# Patient Record
Sex: MALE | Race: ASIAN | NOT HISPANIC OR LATINO | Employment: OTHER | URBAN - METROPOLITAN AREA
[De-identification: names, ages, dates, MRNs, and addresses within clinical notes are randomized per-mention and may not be internally consistent; named-entity substitution may affect disease eponyms.]

---

## 2018-10-26 ENCOUNTER — TRANSFERRED RECORDS (OUTPATIENT)
Dept: HEALTH INFORMATION MANAGEMENT | Facility: CLINIC | Age: 68
End: 2018-10-26

## 2018-11-09 ENCOUNTER — PRE VISIT (OUTPATIENT)
Dept: UROLOGY | Facility: CLINIC | Age: 68
End: 2018-11-09

## 2018-11-09 NOTE — TELEPHONE ENCOUNTER
MEDICAL RECORDS REQUEST   [facility]  Urology Clinic  [address] [place] [address]  PHONE: [phone]  Fax: [phone]        FUTURE VISIT INFORMATION                                                   Sweetie Carrero, : 1950 scheduled for future visit at [facility]    APPOINTMENT INFORMATION:    Date: 2018    Provider:  Fracisco Gtz    Reason for Visit/Diagnosis: Disease of the [facility]    REFERRAL INFORMATION:    Referring provider:  Adolfo Harrington    Specialty: MD    Referring providers clinic:  [facility]    [facility] contact number:  [phone]    RECORDS REQUESTED FOR VISIT                                                     NOTES  STATUS/DETAILS   OFFICE NOTE from referring provider  yes   OFFICE NOTE from other specialist  no   DISCHARGE SUMMARY from hospital  no   DISCHARGE REPORT from the ER  no   OPERATIVE REPORT  no   MEDICATION LIST  yes       PRE-VISIT CHECKLIST      Record collection complete Yes  In care everywhere   Appointment appropriately scheduled           (right time/right provider) Yes   MyChart activation If no, please explain in process   Questionnaire complete If no, please explain in process     Completed by: Violeta Peterson

## 2018-11-19 ENCOUNTER — PRE VISIT (OUTPATIENT)
Dept: UROLOGY | Facility: CLINIC | Age: 68
End: 2018-11-19

## 2018-11-19 NOTE — TELEPHONE ENCOUNTER
Reason for visit: Paget's disease of the scrotum    Relevant information: Referred by Dr. Harrington    Records/imaging/labs: records available in Care Everywhere    Pt called: no need for a call    Rooming: regular

## 2018-11-20 ENCOUNTER — PATIENT OUTREACH (OUTPATIENT)
Dept: CARE COORDINATION | Facility: CLINIC | Age: 68
End: 2018-11-20

## 2018-11-26 ENCOUNTER — OFFICE VISIT (OUTPATIENT)
Dept: UROLOGY | Facility: CLINIC | Age: 68
End: 2018-11-26
Payer: MEDICARE

## 2018-11-26 ENCOUNTER — ALLIED HEALTH/NURSE VISIT (OUTPATIENT)
Dept: UROLOGY | Facility: CLINIC | Age: 68
End: 2018-11-26
Payer: MEDICARE

## 2018-11-26 ENCOUNTER — TELEPHONE (OUTPATIENT)
Dept: UROLOGY | Facility: CLINIC | Age: 68
End: 2018-11-26

## 2018-11-26 VITALS
HEART RATE: 83 BPM | DIASTOLIC BLOOD PRESSURE: 73 MMHG | WEIGHT: 155.2 LBS | HEIGHT: 66 IN | SYSTOLIC BLOOD PRESSURE: 141 MMHG | BODY MASS INDEX: 24.94 KG/M2

## 2018-11-26 DIAGNOSIS — C44.99: Primary | ICD-10-CM

## 2018-11-26 PROBLEM — S01.00XA OPEN WOUND OF SCALP: Status: ACTIVE | Noted: 2018-11-26

## 2018-11-26 PROBLEM — C63.2: Status: ACTIVE | Noted: 2018-11-26

## 2018-11-26 PROBLEM — C63.2: Status: ACTIVE | Noted: 2018-10-29

## 2018-11-26 PROBLEM — B35.6 TINEA CRURIS: Status: ACTIVE | Noted: 2018-11-26

## 2018-11-26 RX ORDER — CEFAZOLIN SODIUM 1 G/50ML
1 INJECTION, SOLUTION INTRAVENOUS SEE ADMIN INSTRUCTIONS
Status: CANCELLED | OUTPATIENT
Start: 2018-11-26

## 2018-11-26 RX ORDER — MUPIROCIN 20 MG/G
OINTMENT TOPICAL 3 TIMES DAILY
COMMUNITY

## 2018-11-26 RX ORDER — PRAVASTATIN SODIUM 20 MG
TABLET ORAL
COMMUNITY
Start: 2018-09-11

## 2018-11-26 RX ORDER — CEFAZOLIN SODIUM 2 G/50ML
2 SOLUTION INTRAVENOUS
Status: CANCELLED | OUTPATIENT
Start: 2018-11-26

## 2018-11-26 RX ORDER — LISINOPRIL AND HYDROCHLOROTHIAZIDE 12.5; 2 MG/1; MG/1
TABLET ORAL
COMMUNITY
Start: 2018-09-18 | End: 2019-02-19

## 2018-11-26 ASSESSMENT — ENCOUNTER SYMPTOMS
NAIL CHANGES: 0
SKIN CHANGES: 0
POOR WOUND HEALING: 0

## 2018-11-26 NOTE — LETTER
11/26/2018       RE: Sweetie Carrero  [address] [facility] [address]     Dear Colleague,    Thank you for referring your patient, Sweetie Carrero, to the [facility] UROLOGY AND INST FOR PROSTATE AND UROLOGIC CANCERS at [facility]. Please see a copy of my visit note below.      Name: Sweetie Carrero    MRN: [id number]   YOB: 1950                 Chief Complaint:   Extramammary Paget's Disease          History of Present Illness:   Mr. Sweetie Carrero is a 68 year old male seen in consultation from Dr. Harrington for EMPD of the scrotum. Pt has small area of EMPD of the scrotum that he presents for evaluation. This is biopsy-proven per patient and phone conversation with referring doctor but I do not have the path report.          Past Medical History:   Diabetes, HLD, HTN, kidney stones         Past Surgical History:   ESWL         Social History:     Social History   Substance Use Topics     Smoking status: Former Smoker     Smokeless tobacco: Never Used     Alcohol use Not on file            Family History:   No family history of urologic problems similar to those being experienced by the patient.          Allergies:     Allergies   Allergen Reactions     Atorvastatin Muscle Pain (Myalgia)     PN: weakness     Simvastatin Muscle Pain (Myalgia)     PN: weakness            Medications:     Current Outpatient Prescriptions   Medication Sig     aspirin (ASA) 81 MG EC tablet Take 81 mg by mouth     DIPHENHYDRAMINE HCL PO Take 25 mg by mouth     mupirocin (BACTROBAN) 2 % ointment Apply topically 3 times daily     pravastatin (PRAVACHOL) 20 MG tablet TAKE ONE TABLET BY MOUTH ONCE DAILY     lisinopril-hydrochlorothiazide (PRINZIDE/ZESTORETIC) 20-12.5 MG per tablet      No current facility-administered medications for this visit.              Review of Systems:    ROS: 14 point ROS neg other than the symptoms noted above in the HPI and PMH.          Physical Exam:   B/P: 141/73, T:  "Data Unavailable, P: 83, R: Data Unavailable  Estimated body mass index is 24.97 kg/(m^2) as calculated from the following:    Height as of this encounter: 1.679 m (5' 6.1\").    Weight as of this encounter: 70.4 kg (155 lb 3.2 oz).  General: age-appropriate appearing male in NAD. normal body habitus.  HEENT: Head AT/NC, EOMI, CN Grossly intact  Resp: no respiratory distress, lung sounds clear.  CV: heart rate regular, S1, S2.  Lymph: No cervical, supraclavicular or axillary lymphadenopathy  Back: bony spine is non-tender, flanks are nontender  Abdomen: no obesity , soft, non-distended, non-tender. No organomegaly. : testicles normal without atrophy or masses; small area of irregularity on inferior scrotum 1cm x 1cm. Two smaller patches on anterior scrotum -- left and right.   Rectal exam: deferred  LE: no edema.   Neuro: grossly intact  Motor: excellent strength throughout  Skin: clear of rashes or ecchymoses.        Labs:    All laboratory data reviewed with patient  Significant for Hgb A1C 6.5      Imaging:    N/A      Outside records:    I spent 10 minutes reviewing outside records.    Procedure Note:  Because the patient would like excision of this lesion ASAP and because I noted two other abnormal areas in clinic today we expedited a scrotum biopsy in clinic today at the same time as the consultation.    Pre-Procedure Diagnosis: scrotal lesion x 2  Post-Procedure Diagnosis: same  Procedure: scrotal biopsy x 2  Indications: see above  Procedure: The scrotum was prepped and draped in the usual fashion. The two areas were infiltrated with 1% lido with epi. Two ellipses of skin were excised with scissors and the skin and soft tissue reapproximated with chromic sutures.             Assessment and Plan:   68 year old male with EMPD of scrotum. Plan is for local primary excision. Will f/u on biopsies obtained today to determine what sites will need wide local excision. Need to get outside slides re-read. He " understands the risks to include but not be limited to bleeding, infection, penile or scrotal pain/numbness, change in erectile or ejaculatory function, need for additional procedures and recurrence of primary disease. He wishes to proceed.    F/U: in OR    Reuben Villeda MD  November 26, 2018     =======================================================  As the attending surgeon I, Fracisco Gtz, interviewed and examined the patient. The plan was developed between me and the patient. My findings and plan are as stated by the resident.    Fracisco Gtz MD

## 2018-11-26 NOTE — MR AVS SNAPSHOT
After Visit Summary   11/26/2018    Sweteie Carrero    MRN: [id number]           Patient Information     Date Of Birth          1950        Visit Information        Provider Department      11/26/2018 11:00 AM Nurse, [facility] Urology and [facility] for Prostate and Urologic Cancers        Today's Diagnoses     Pagets disease, extramammary    -  1       Follow-ups after your visit        Your next 10 appointments already scheduled     Dec 05, 2018   Procedure with Fracisco Gtz MD   [facility] Surgery and Procedure Center ([facility])    [address] [place] [address]  5th [facility] [id number] [phone]           Located in the Clinics and Surgery Center at [address] [place] [address].   parking is very convenient and highly recommended.  is a $6 flat rate fee.  Both  and self parkers should enter the main arrival plaza from [facility]; parking attendants will direct you based on your parking preference.            Jan 07, 2019  4:30 PM CST   (Arrive by 4:15 PM)   Post-Op with Reuben Villeda MD   [facility] Urology and [facility] for Prostate and Urologic Cancers ([facility])    [address] [place] [address]  4th [facility] [address] [phone]              Who to contact     Please call your clinic at [phone] to:    Ask questions about your health    Make or cancel appointments    Discuss your medicines    Learn about your test results    Speak to your doctor            Additional Information About Your Visit        MyChart Information     [employer] gives you secure access to your electronic health record. If you see a primary care provider, you can also send messages to your care team and make appointments. If you have questions, please call your primary care clinic.  If you do not have a primary care provider, please call [phone] and they will assist you.      [employer] is an  electronic gateway that provides easy, online access to your medical records. With [facility], you can request a clinic appointment, read your test results, renew a prescription or communicate with your care team.     To access your existing account, please contact your [facility] or call [phone] for assistance.        Care EveryWhere ID     This is your Care EveryWhere ID. This could be used by other organizations to access your [place] medical records  [id number]         Blood Pressure from Last 3 Encounters:   11/26/18 141/73    Weight from Last 3 Encounters:   11/26/18 70.4 kg (155 lb 3.2 oz)              Today, you had the following     No orders found for display       Primary Care Provider Fax #    Physician No Ref-Primary [phone]       No address on file        Equal Access to Services     CHARANJIT CHOU : Kamryn henleyo Sojian, waaxda luqadaha, qaybta kaalmada adeegyada, ana suero . So [facility] [phone].    ATENCIÓN: Si habla español, tiene a armendariz disposición servicios gratuitos de asistencia lingüística. Llame al [phone].    We comply with applicable federal civil rights laws and [place] laws. We do not discriminate on the basis of race, color, national origin, age, disability, sex, sexual orientation, or gender identity.            Thank you!     Thank you for choosing [facility] UROLOGY AND [facility] FOR PROSTATE AND UROLOGIC CANCERS  for your care. Our goal is always to provide you with excellent care. Hearing back from our patients is one way we can continue to improve our services. Please take a few minutes to complete the written survey that you may receive in the mail after your visit with us. Thank you!             Your Updated Medication List - Protect others around you: Learn how to safely use, store and throw away your medicines at www.disposemymeds.org.          This list is accurate as of 11/26/18 11:32 AM.  Always use  your most recent med list.                   Brand Name Dispense Instructions for use Diagnosis    aspirin 81 MG EC tablet    ASA     Take 81 mg by mouth        DIPHENHYDRAMINE HCL PO      Take 25 mg by mouth        lisinopril-hydrochlorothiazide 20-12.5 MG per tablet    PRINZIDE/ZESTORETIC          mupirocin 2 % ointment    BACTROBAN     Apply topically 3 times daily        pravastatin 20 MG tablet    PRAVACHOL     TAKE ONE TABLET BY MOUTH ONCE DAILY

## 2018-11-26 NOTE — PROGRESS NOTES
"  Name: Sweetie Carrero    MRN: [id number]   YOB: 1950                 Chief Complaint:   Extramammary Paget's Disease          History of Present Illness:   Mr. Sweetie Carrero is a 68 year old male seen in consultation from Dr. Harrington for EMPD of the scrotum. Pt has small area of EMPD of the scrotum that he presents for evaluation. This is biopsy-proven per patient and phone conversation with referring doctor but I do not have the path report.          Past Medical History:   Diabetes, HLD, HTN, kidney stones         Past Surgical History:   ESWL         Social History:     Social History   Substance Use Topics     Smoking status: Former Smoker     Smokeless tobacco: Never Used     Alcohol use Not on file            Family History:   No family history of urologic problems similar to those being experienced by the patient.          Allergies:     Allergies   Allergen Reactions     Atorvastatin Muscle Pain (Myalgia)     PN: weakness     Simvastatin Muscle Pain (Myalgia)     PN: weakness            Medications:     Current Outpatient Prescriptions   Medication Sig     aspirin (ASA) 81 MG EC tablet Take 81 mg by mouth     DIPHENHYDRAMINE HCL PO Take 25 mg by mouth     mupirocin (BACTROBAN) 2 % ointment Apply topically 3 times daily     pravastatin (PRAVACHOL) 20 MG tablet TAKE ONE TABLET BY MOUTH ONCE DAILY     lisinopril-hydrochlorothiazide (PRINZIDE/ZESTORETIC) 20-12.5 MG per tablet      No current facility-administered medications for this visit.              Review of Systems:    ROS: 14 point ROS neg other than the symptoms noted above in the HPI and PMH.          Physical Exam:   B/P: 141/73, T: Data Unavailable, P: 83, R: Data Unavailable  Estimated body mass index is 24.97 kg/(m^2) as calculated from the following:    Height as of this encounter: 1.679 m (5' 6.1\").    Weight as of this encounter: 70.4 kg (155 lb 3.2 oz).  General: age-appropriate appearing male in NAD. normal body habitus.  HEENT: Head " AT/NC, EOMI, CN Grossly intact  Resp: no respiratory distress, lung sounds clear.  CV: heart rate regular, S1, S2.  Lymph: No cervical, supraclavicular or axillary lymphadenopathy  Back: bony spine is non-tender, flanks are nontender  Abdomen: no obesity , soft, non-distended, non-tender. No organomegaly. : testicles normal without atrophy or masses; small area of irregularity on inferior scrotum 1cm x 1cm. Two smaller patches on anterior scrotum -- left and right.   Rectal exam: deferred  LE: no edema.   Neuro: grossly intact  Motor: excellent strength throughout  Skin: clear of rashes or ecchymoses.        Labs:    All laboratory data reviewed with patient  Significant for Hgb A1C 6.5      Imaging:    N/A      Outside records:    I spent 10 minutes reviewing outside records.    Procedure Note:  Because the patient would like excision of this lesion ASAP and because I noted two other abnormal areas in clinic today we expedited a scrotum biopsy in clinic today at the same time as the consultation.    Pre-Procedure Diagnosis: scrotal lesion x 2  Post-Procedure Diagnosis: same  Procedure: scrotal biopsy x 2  Indications: see above  Procedure: The scrotum was prepped and draped in the usual fashion. The two areas were infiltrated with 1% lido with epi. Two ellipses of skin were excised with scissors and the skin and soft tissue reapproximated with chromic sutures.             Assessment and Plan:   68 year old male with EMPD of scrotum. Plan is for local primary excision. Will f/u on biopsies obtained today to determine what sites will need wide local excision. Need to get outside slides re-read. He understands the risks to include but not be limited to bleeding, infection, penile or scrotal pain/numbness, change in erectile or ejaculatory function, need for additional procedures and recurrence of primary disease. He wishes to proceed.    F/U: in OR    Reuben Villeda MD  November 26, 2018      =======================================================  As the attending surgeon I, Fracisco Gtz, interviewed and examined the patient. The plan was developed between me and the patient. My findings and plan are as stated by the resident.    Fracisco Gtz MD    Answers for HPI/ROS submitted by the patient on 11/26/2018   General Symptoms: No  Skin Symptoms: Yes  HENT Symptoms: No  EYE SYMPTOMS: No  HEART SYMPTOMS: No  LUNG SYMPTOMS: No  INTESTINAL SYMPTOMS: No  URINARY SYMPTOMS: No  REPRODUCTIVE SYMPTOMS: No  SKELETAL SYMPTOMS: No  BLOOD SYMPTOMS: No  NERVOUS SYSTEM SYMPTOMS: No  MENTAL HEALTH SYMPTOMS: No  Changes in hair: No  Changes in moles/birth marks: No  Itching: Yes  Rashes: Yes  Changes in nails: No  Acne: No  Change in facial hair: No  Warts: No  Non-healing sores: No  Scarring: No  Flaking of skin: No  Color changes of hands/feet in cold : No  Sun sensitivity: No  Skin thickening: No  PHQ-2 Score: 0

## 2018-11-26 NOTE — MR AVS SNAPSHOT
After Visit Summary   11/26/2018    Sweetie Carrero    MRN: [id number]           Patient Information     Date Of Birth          1950        Visit Information        Provider Department      11/26/2018 9:00 AM Fracisco Gtz MD [facility] Urology and [facility] for Prostate and Urologic Cancers        Today's Diagnoses     Pagets disease, extramammary    -  1       Follow-ups after your visit        Your next 10 appointments already scheduled     Dec 05, 2018   Procedure with Fracisco Gtz MD   [facility] Surgery and Procedure Center ([facility])    [address] [place] [address]  5th [facility] [address] [phone]           Located in the Clinics and Surgery Center at [address] [place] [address].   parking is very convenient and highly recommended.  is a $6 flat rate fee.  Both  and self parkers should enter the main arrival plaza from [facility]; parking attendants will direct you based on your parking preference.            Jan 07, 2019  4:30 PM CST   (Arrive by 4:15 PM)   Post-Op with Reuben Villeda MD   [facility] Urology and [facility] for Prostate and Urologic Cancers ([facility])    [address] [place] [address]  4th [facility] [address] [phone]              Who to contact     Please call your clinic at [phone] to:    Ask questions about your health    Make or cancel appointments    Discuss your medicines    Learn about your test results    Speak to your doctor            Additional Information About Your Visit        MyChart Information     [employer] gives you secure access to your electronic health record. If you see a primary care provider, you can also send messages to your care team and make appointments. If you have questions, please call your primary care clinic.  If you do not have a primary care provider, please call [phone] and they will assist you.      [employer] is an electronic  "gateway that provides easy, online access to your medical records. With [employer], you can request a clinic appointment, read your test results, renew a prescription or communicate with your care team.     To access your existing account, please contact your [facility] or call [phone] for assistance.        Care EveryWhere ID     This is your Care EveryWhere ID. This could be used by other organizations to access your [place] medical records  [id number]        Your Vitals Were     Pulse Height BMI (Body Mass Index)             83 1.679 m (5' 6.1\") 24.97 kg/m2          Blood Pressure from Last 3 Encounters:   11/26/18 141/73    Weight from Last 3 Encounters:   11/26/18 70.4 kg (155 lb 3.2 oz)              We Performed the Following     BIOPSY SKIN/SUBQ/MUC MEM, SINGLE LESION     Ronel-Operative Worksheet  (Urology General)     Surgical pathology exam     Urine Culture Aerobic Bacterial        Primary Care Provider Fax #    Physician No Ref-Primary [phone]       No address on file        Equal Access to Services     CHARANJIT CHOU : Hadii aad ku hadasho Soomaali, waaxda luqadaha, qaybta kaalmada adeegyada, waxay sayrain haylawson suero . So [facility] [phone].    ATENCIÓN: Si habla español, tiene a armendariz disposición servicios gratuitos de asistencia lingüística. Llame al [phone].    We comply with applicable federal civil rights laws and [place] laws. We do not discriminate on the basis of race, color, national origin, age, disability, sex, sexual orientation, or gender identity.            Thank you!     Thank you for choosing [facility] UROLOGY AND [facility] FOR PROSTATE AND UROLOGIC CANCERS  for your care. Our goal is always to provide you with excellent care. Hearing back from our patients is one way we can continue to improve our services. Please take a few minutes to complete the written survey that you may receive in the mail after your visit with us. Thank you!   "           Your Updated Medication List - Protect others around you: Learn how to safely use, store and throw away your medicines at www.disposemymeds.org.          This list is accurate as of 11/26/18  3:55 PM.  Always use your most recent med list.                   Brand Name Dispense Instructions for use Diagnosis    aspirin 81 MG EC tablet    ASA     Take 81 mg by mouth        DIPHENHYDRAMINE HCL PO      Take 25 mg by mouth        lisinopril-hydrochlorothiazide 20-12.5 MG per tablet    PRINZIDE/ZESTORETIC          mupirocin 2 % ointment    BACTROBAN     Apply topically 3 times daily        pravastatin 20 MG tablet    PRAVACHOL     TAKE ONE TABLET BY MOUTH ONCE DAILY

## 2018-11-26 NOTE — NURSING NOTE
The following medication was given:     MEDICATION:  Lidocaine with epinephrine  ROUTE: administered by physician - scrotal  SITE: administered by physician - scrotal  DOSE: 10 mL  LOT #: [id number]  : [employer]  EXPIRATION DATE: 4/2019  NDC#: 18875-158-38   Was there drug waste? Yes  Amount of drug waste (mL): 10 mL.  Reason for waste:  Single use vial      Ann Marie Spangler LPN  November 26, 2018

## 2018-11-26 NOTE — TELEPHONE ENCOUNTER
Patient is scheduled for surgery with Dr. Gtz      Spoke or left message with: scheduled in clinic    Date of Surgery: 12/5/18    Location: [facility] OR    Informed patient they will need an adult  yes    Pre-op with surgeon (if applicable): n/a    H&P: Scheduled with pcp    Additional imaging/appointments: n/a    Surgery packet: given in clinic     Additional comments: n/a

## 2018-11-26 NOTE — PROGRESS NOTES
Pre Op Teaching Flowsheet       Pre and Post op Patient Education  Relevant Diagnosis:  Pagets disease, extramammary   Teaching Topic:  Pre and post op teaching for Wide local excision of scrotal lesions  Person Involved in teaching:  Sweetie Carrero      Motivation Level:  Asks Questions: Yes  Eager to Learn:  Yes  Cooperative: Yes  Receptive (willing/able to accept information):  Yes  Patient demonstrates understanding of the following:  Date and time of surgery:  12/5/18  Location of surgery: [facility]- 5th Floor  History and Physical and any other testing necessary prior to surgery: Yes  Required time line for completion of History and Physical and any pre-op testing: Yes    NPO Guidelines: NPO per Anesthesia Guidelines    Patient demonstrates understanding of the following:  Patient understands the need for a responsible adult to drive them home and someone to stay with them for the first 24 hours post-operatively: YES   Pre-op bowel prep: No, not needed  Pre-op showering/scrub information with Hibiclens Soap: Yes  Medications to take the day of surgery:  Per PCP  Blood thinner medications discussed and when to stop (if applicable):  Yes  Diabetes medication management (if applicable):  N/A  Discussed pain control after surgery: pain scale, pain medications and pain management techniques  Infection Prevention: Patient demonstrates understanding of the following:  Patient instructed on hand hygiene:  Yes  Surgical procedure site care taught: Yes  Signs and symptoms of infection taught:  Yes  Wound care will be taught at the time of discharge.  Central venous catheter care will be taught at the time of discharge (if applicable).    Post-op follow-up:  Discussed how to contact the hospital, nurse, and clinic scheduling staff if necessary.    Instructional materials used/given/mailed:  [place] [facility] Booklet, post op teaching sheet, Map, Soap, and arrival/location  information.    Surgical instructions given to patient in clinic: Yes.    Instructional Materials given:  Before your surgery packet , Medications to avoid before surgery , Showering or Bathing instructions before surgery  and What to expect after surgery    Post-op appointment/testing scheduled per MD orders: Yes    Total time with patient: 10 minutes    Brenda Coleman RN, BSN  Urology Care Coordinator

## 2018-11-27 LAB
BACTERIA SPEC CULT: NO GROWTH
Lab: NORMAL
SPECIMEN SOURCE: NORMAL

## 2018-11-28 LAB — COPATH REPORT: NORMAL

## 2018-12-04 ENCOUNTER — ANESTHESIA EVENT (OUTPATIENT)
Dept: SURGERY | Facility: AMBULATORY SURGERY CENTER | Age: 68
End: 2018-12-04

## 2018-12-05 ENCOUNTER — HOSPITAL ENCOUNTER (OUTPATIENT)
Facility: AMBULATORY SURGERY CENTER | Age: 68
End: 2018-12-05
Attending: UROLOGY
Payer: MEDICARE

## 2018-12-05 ENCOUNTER — ANESTHESIA (OUTPATIENT)
Dept: SURGERY | Facility: AMBULATORY SURGERY CENTER | Age: 68
End: 2018-12-05

## 2018-12-05 VITALS
HEART RATE: 78 BPM | RESPIRATION RATE: 16 BRPM | BODY MASS INDEX: 23.54 KG/M2 | WEIGHT: 150 LBS | TEMPERATURE: 98.8 F | SYSTOLIC BLOOD PRESSURE: 118 MMHG | OXYGEN SATURATION: 99 % | DIASTOLIC BLOOD PRESSURE: 73 MMHG | HEIGHT: 67 IN

## 2018-12-05 DIAGNOSIS — N50.9 SCROTAL LESION: Primary | ICD-10-CM

## 2018-12-05 LAB
GLUCOSE BLDC GLUCOMTR-MCNC: 119 MG/DL (ref 70–99)
GLUCOSE BLDC GLUCOMTR-MCNC: 95 MG/DL (ref 70–99)

## 2018-12-05 PROCEDURE — 88305 TISSUE EXAM BY PATHOLOGIST: CPT | Performed by: UROLOGY

## 2018-12-05 RX ORDER — SODIUM CHLORIDE, SODIUM LACTATE, POTASSIUM CHLORIDE, CALCIUM CHLORIDE 600; 310; 30; 20 MG/100ML; MG/100ML; MG/100ML; MG/100ML
INJECTION, SOLUTION INTRAVENOUS CONTINUOUS
Status: DISCONTINUED | OUTPATIENT
Start: 2018-12-05 | End: 2018-12-06 | Stop reason: HOSPADM

## 2018-12-05 RX ORDER — OXYCODONE HYDROCHLORIDE 5 MG/1
5 TABLET ORAL EVERY 4 HOURS PRN
Status: DISCONTINUED | OUTPATIENT
Start: 2018-12-05 | End: 2018-12-06 | Stop reason: HOSPADM

## 2018-12-05 RX ORDER — FENTANYL CITRATE 50 UG/ML
INJECTION, SOLUTION INTRAMUSCULAR; INTRAVENOUS PRN
Status: DISCONTINUED | OUTPATIENT
Start: 2018-12-05 | End: 2018-12-05

## 2018-12-05 RX ORDER — CEFAZOLIN SODIUM 2 G/50ML
2 SOLUTION INTRAVENOUS
Status: COMPLETED | OUTPATIENT
Start: 2018-12-05 | End: 2018-12-05

## 2018-12-05 RX ORDER — CEFAZOLIN SODIUM 1 G/50ML
1 SOLUTION INTRAVENOUS SEE ADMIN INSTRUCTIONS
Status: DISCONTINUED | OUTPATIENT
Start: 2018-12-05 | End: 2018-12-06 | Stop reason: HOSPADM

## 2018-12-05 RX ORDER — ONDANSETRON 2 MG/ML
4 INJECTION INTRAMUSCULAR; INTRAVENOUS EVERY 30 MIN PRN
Status: DISCONTINUED | OUTPATIENT
Start: 2018-12-05 | End: 2018-12-06 | Stop reason: HOSPADM

## 2018-12-05 RX ORDER — PROPOFOL 10 MG/ML
INJECTION, EMULSION INTRAVENOUS CONTINUOUS PRN
Status: DISCONTINUED | OUTPATIENT
Start: 2018-12-05 | End: 2018-12-05

## 2018-12-05 RX ORDER — BUPIVACAINE HYDROCHLORIDE 5 MG/ML
INJECTION, SOLUTION PERINEURAL PRN
Status: DISCONTINUED | OUTPATIENT
Start: 2018-12-05 | End: 2018-12-05 | Stop reason: HOSPADM

## 2018-12-05 RX ORDER — ONDANSETRON 2 MG/ML
INJECTION INTRAMUSCULAR; INTRAVENOUS PRN
Status: DISCONTINUED | OUTPATIENT
Start: 2018-12-05 | End: 2018-12-05

## 2018-12-05 RX ORDER — LIDOCAINE 40 MG/G
CREAM TOPICAL
Status: DISCONTINUED | OUTPATIENT
Start: 2018-12-05 | End: 2018-12-06 | Stop reason: HOSPADM

## 2018-12-05 RX ORDER — ONDANSETRON 4 MG/1
4 TABLET, ORALLY DISINTEGRATING ORAL EVERY 30 MIN PRN
Status: DISCONTINUED | OUTPATIENT
Start: 2018-12-05 | End: 2018-12-06 | Stop reason: HOSPADM

## 2018-12-05 RX ORDER — DEXAMETHASONE SODIUM PHOSPHATE 4 MG/ML
INJECTION, SOLUTION INTRA-ARTICULAR; INTRALESIONAL; INTRAMUSCULAR; INTRAVENOUS; SOFT TISSUE PRN
Status: DISCONTINUED | OUTPATIENT
Start: 2018-12-05 | End: 2018-12-05

## 2018-12-05 RX ORDER — NALOXONE HYDROCHLORIDE 0.4 MG/ML
.1-.4 INJECTION, SOLUTION INTRAMUSCULAR; INTRAVENOUS; SUBCUTANEOUS
Status: DISCONTINUED | OUTPATIENT
Start: 2018-12-05 | End: 2018-12-06 | Stop reason: HOSPADM

## 2018-12-05 RX ORDER — ACETAMINOPHEN 325 MG/1
975 TABLET ORAL ONCE
Status: COMPLETED | OUTPATIENT
Start: 2018-12-05 | End: 2018-12-05

## 2018-12-05 RX ORDER — FENTANYL CITRATE 50 UG/ML
25-50 INJECTION, SOLUTION INTRAMUSCULAR; INTRAVENOUS
Status: DISCONTINUED | OUTPATIENT
Start: 2018-12-05 | End: 2018-12-06 | Stop reason: HOSPADM

## 2018-12-05 RX ORDER — MEPERIDINE HYDROCHLORIDE 25 MG/ML
12.5 INJECTION INTRAMUSCULAR; INTRAVENOUS; SUBCUTANEOUS
Status: DISCONTINUED | OUTPATIENT
Start: 2018-12-05 | End: 2018-12-06 | Stop reason: HOSPADM

## 2018-12-05 RX ORDER — OXYCODONE HYDROCHLORIDE 5 MG/1
5 TABLET ORAL EVERY 6 HOURS PRN
Qty: 15 TABLET | Refills: 0 | Status: ON HOLD | OUTPATIENT
Start: 2018-12-05 | End: 2019-02-21

## 2018-12-05 RX ORDER — HYDROMORPHONE HYDROCHLORIDE 1 MG/ML
.3-.5 INJECTION, SOLUTION INTRAMUSCULAR; INTRAVENOUS; SUBCUTANEOUS EVERY 10 MIN PRN
Status: DISCONTINUED | OUTPATIENT
Start: 2018-12-05 | End: 2018-12-06 | Stop reason: HOSPADM

## 2018-12-05 RX ADMIN — PROPOFOL 150 MCG/KG/MIN: 10 INJECTION, EMULSION INTRAVENOUS at 11:12

## 2018-12-05 RX ADMIN — DEXAMETHASONE SODIUM PHOSPHATE 4 MG: 4 INJECTION, SOLUTION INTRA-ARTICULAR; INTRALESIONAL; INTRAMUSCULAR; INTRAVENOUS; SOFT TISSUE at 11:15

## 2018-12-05 RX ADMIN — SODIUM CHLORIDE, SODIUM LACTATE, POTASSIUM CHLORIDE, CALCIUM CHLORIDE: 600; 310; 30; 20 INJECTION, SOLUTION INTRAVENOUS at 11:10

## 2018-12-05 RX ADMIN — SODIUM CHLORIDE, SODIUM LACTATE, POTASSIUM CHLORIDE, CALCIUM CHLORIDE: 600; 310; 30; 20 INJECTION, SOLUTION INTRAVENOUS at 10:11

## 2018-12-05 RX ADMIN — ONDANSETRON 4 MG: 2 INJECTION INTRAMUSCULAR; INTRAVENOUS at 11:15

## 2018-12-05 RX ADMIN — ACETAMINOPHEN 975 MG: 325 TABLET ORAL at 10:11

## 2018-12-05 RX ADMIN — FENTANYL CITRATE 25 MCG: 50 INJECTION, SOLUTION INTRAMUSCULAR; INTRAVENOUS at 11:13

## 2018-12-05 RX ADMIN — CEFAZOLIN SODIUM 2 G: 2 SOLUTION INTRAVENOUS at 11:15

## 2018-12-05 ASSESSMENT — LIFESTYLE VARIABLES: TOBACCO_USE: 1

## 2018-12-05 NOTE — ANESTHESIA CARE TRANSFER NOTE
Patient: Sweetie Carrero    Procedure(s):  Wide Local Excision of Scrotal Lesions    Diagnosis: Extramammary Pagets Disease  Diagnosis Additional Information: No value filed.    Anesthesia Type:   No value filed.     Note:  Airway :Room Air  Patient transferred to:Phase II  Comments: Patient awake and breathing spont. VSS. No complaints of pain or nausea. Report to RnHandoff Report: Identifed the Patient, Identified the Reponsible Provider, Reviewed the pertinent medical history, Discussed the surgical course, Reviewed Intra-OP anesthesia mangement and issues during anesthesia, Set expectations for post-procedure period and Allowed opportunity for questions and acknowledgement of understanding      Vitals: (Last set prior to Anesthesia Care Transfer)    CRNA VITALS  12/5/2018 1206 - 12/5/2018 1244      12/5/2018             Pulse: 70    SpO2: 99 %    Resp Rate (set): 10                Electronically Signed By: KERRY Neri CRNA  December 5, 2018  12:44 PM

## 2018-12-05 NOTE — IP AVS SNAPSHOT
[facility]    [address] [place] [id number]    Phone:  [phone]    Fax:  [phone]                                       After Visit Summary   12/5/2018    Sweetie Carrero    MRN: [id number]           After Visit Summary Signature Page     I have received my discharge instructions, and my questions have been answered. I have discussed any challenges I see with this plan with the nurse or doctor.    ..........................................................................................................................................  Patient/Patient Representative Signature      ..........................................................................................................................................  Patient Representative Print Name and Relationship to Patient    ..................................................               ................................................  Date                                   Time    ..........................................................................................................................................  Reviewed by Signature/Title    ...................................................              ..............................................  Date                                               Time          22EPIC Rev 08/18

## 2018-12-05 NOTE — ANESTHESIA PREPROCEDURE EVALUATION
Anesthesia Pre-Procedure Evaluation    Patient: Sweetie Carrero   MRN:     [id number] Gender:   male   Age:    68 year old :      1950        Preoperative Diagnosis: Extramammary Pagets Disease   Procedure(s):  Wide Local Excision of Scrotal Lesions     Past Medical History:   Diagnosis Date     Hypertension       History reviewed. No pertinent surgical history.       Anesthesia Evaluation     . Pt has had prior anesthetic.     No history of anesthetic complications          ROS/MED HX    ENT/Pulmonary:  - neg pulmonary ROS   (+)tobacco use, Past use , . .    Neurologic:  - neg neurologic ROS     Cardiovascular:     (+) hypertension----. : . . . :. .       METS/Exercise Tolerance:  4 - Raking leaves, gardening   Hematologic:         Musculoskeletal:  - neg musculoskeletal ROS       GI/Hepatic:  - neg GI/hepatic ROS       Renal/Genitourinary:     (+) chronic renal disease,       Endo:     (+) type I DM, .      Psychiatric:  - neg psychiatric ROS       Infectious Disease:         Malignancy:   (+) Malignancy History of Other  Other CA scrotum status post         Other:    - neg other ROS                     PHYSICAL EXAM:   Mental Status/Neuro: A/A/O   Airway: Facies: Feasible  Mallampati: II  Mouth/Opening: Full  TM distance: > 6 cm  Neck ROM: Full   Respiratory: Auscultation: CTAB     Resp. Rate: Normal     Resp. Effort: Normal      CV: Rhythm: Regular  Rate: Age appropriate  Heart: Normal Sounds   Comments:      Dental: Normal                  No results found for: WBC, HGB, HCT, PLT, CRP, SED, NA, POTASSIUM, CHLORIDE, CO2, BUN, CR, GLC, NICK, PHOS, MAG, ALBUMIN, PROTTOTAL, ALT, AST, GGT, ALKPHOS, BILITOTAL, BILIDIRECT, LIPASE, AMYLASE, JAIMIE, PTT, INR, FIBR, TSH, T4, T3, HCG, HCGS, CKTOTAL, CKMB, TROPN    Preop Vitals  BP Readings from Last 3 Encounters:   18 145/86   18 141/73    Pulse Readings from Last 3 Encounters:   18 78   18 83      Resp Readings from Last 3 Encounters:   18  "16    SpO2 Readings from Last 3 Encounters:   12/05/18 98%      Temp Readings from Last 1 Encounters:   12/05/18 36.6  C (97.8  F) (Oral)    Ht Readings from Last 1 Encounters:   12/05/18 1.702 m (5' 7\")      Wt Readings from Last 1 Encounters:   12/05/18 68 kg (150 lb)    Estimated body mass index is 23.49 kg/(m^2) as calculated from the following:    Height as of this encounter: 1.702 m (5' 7\").    Weight as of this encounter: 68 kg (150 lb).     LDA:  Peripheral IV 12/05/18 Left Hand (Active)   Site Assessment WDL 12/5/2018 10:12 AM   Line Status Infusing 12/5/2018 10:12 AM   Phlebitis Scale 0-->no symptoms 12/5/2018 10:12 AM   Infiltration Scale 0 12/5/2018 10:12 AM   Extravasation? No 12/5/2018 10:12 AM   Number of days:0            Assessment:   ASA SCORE: 2    Will be NPO appropriate at: 12/5/2018 11:00 AM   Documentation: H&P complete; Preop Testing complete; Consents complete   Proceeding: Proceed without further delay  Tobacco Use:  NO Active use of Tobacco/UNKNOWN Tobacco use status     Plan:   Anes. Type:  MAC      Induction:  IV (Standard)   Airway: Native Airway   Access/Monitoring: PIV   Maintenance: Propofol; IV   Emergence: Procedure Site   Logistics: Same Day Surgery     Postop Pain/Sedation Strategy:  Standard-Options: Opioids PRN     PONV Management:  Adult Risk Factors:, Non-Smoker, Postop Opioids  Prevention: Propofol Infusion; Ondansetron; Dexamethasone     CONSENT: Direct conversation   Plan and risks discussed with: Patient        Comments for Plan/Consent:  Not NPO clear liquids appropriate. Delay case til 11am.                       ANESTHESIA PREOP EVALUATION    PROCEDURE: Procedure(s):  Wide Local Excision of Scrotal Lesions    HPI: Sweetie Carrero is a 68 year old male who presents for above procedure.    PAST MEDICAL HISTORY:    Past Medical History:   Diagnosis Date     Hypertension        PAST SURGICAL HISTORY:    History reviewed. No pertinent surgical history.    PAST ANESTHESIA " HISTORY:     No personal or family h/o anesthesia problems    SOCIAL HISTORY:       Social History   Substance Use Topics     Smoking status: Former Smoker     Smokeless tobacco: Never Used     Alcohol use Yes      Comment: rarely       ALLERGIES:     Allergies   Allergen Reactions     Atorvastatin Muscle Pain (Myalgia)     PN: weakness     Simvastatin Muscle Pain (Myalgia)     PN: weakness       MEDICATIONS:       (Not in a hospital admission)    Current Outpatient Prescriptions   Medication Sig Dispense Refill     aspirin (ASA) 81 MG EC tablet Take 81 mg by mouth       DIPHENHYDRAMINE HCL PO Take 25 mg by mouth       lisinopril-hydrochlorothiazide (PRINZIDE/ZESTORETIC) 20-12.5 MG per tablet        mupirocin (BACTROBAN) 2 % ointment Apply topically 3 times daily       pravastatin (PRAVACHOL) 20 MG tablet TAKE ONE TABLET BY MOUTH ONCE DAILY         Current Outpatient Prescriptions Ordered in [facility]   Medication Sig Dispense Refill     aspirin (ASA) 81 MG EC tablet Take 81 mg by mouth       DIPHENHYDRAMINE HCL PO Take 25 mg by mouth       lisinopril-hydrochlorothiazide (PRINZIDE/ZESTORETIC) 20-12.5 MG per tablet        mupirocin (BACTROBAN) 2 % ointment Apply topically 3 times daily       pravastatin (PRAVACHOL) 20 MG tablet TAKE ONE TABLET BY MOUTH ONCE DAILY       Current Facility-Administered Medications Ordered in Epic   Medication Dose Route Frequency Provider Last Rate Last Dose     ceFAZolin (ANCEF) intermittent infusion 1 g (pre-mix)  1 g Intravenous See Admin Instructions Reuben Villeda MD         ceFAZolin (ANCEF) intermittent infusion 2 g in 50 mL dextrose PREMIX  2 g Intravenous Pre-Op/Pre-procedure x 1 dose Reuben Villeda MD         lactated ringers infusion   Intravenous Continuous Philip May DO 25 mL/hr at 12/05/18 1011       lidocaine (LMX4) kit   Topical Q1H PRN Philip May DO         lidocaine BUFFERED 1 % injection 1 mL  1 mL Other Q1H PRN Philip May DO          sodium chloride (PF) 0.9% PF flush 3 mL  3 mL Intracatheter Q1H PRN Philip May,          sodium chloride (PF) 0.9% PF flush 3 mL  3 mL Intracatheter Q8H Philip May,            PHYSICAL EXAM:    Vitals: T 97.8, P 78, /86, R 16, SpO2 98%, Weight   Wt Readings from Last 2 Encounters:   12/05/18 68 kg (150 lb)   11/26/18 70.4 kg (155 lb 3.2 oz)     See doc flowsheet    NPO STATUS: see doc flowsheet    LABS:    BMP:  No results for input(s): NA, POTASSIUM, CHLORIDE, CO2, BUN, CR, GLC, NICK in the last [address] hours.    LFTs:   No results for input(s): PROTTOTAL, ALBUMIN, BILITOTAL, ALKPHOS, AST, ALT, BILIDIRECT in the last 47[address] hours.    CBC:   No results for input(s): WBC, RBC, HGB, HCT, MCV, MCH, MCHC, RDW, PLT in the last 79257 hours.    Coags:  No results for input(s): INR, PTT, FIBR in the last 82659 hours.    Imaging:  No orders to display       Rodrigo Dotson MD  Anesthesiology Staff  Pager [phone]    12/5/2018  10:29 AM      Rodrigo Dotson MD

## 2018-12-05 NOTE — ANESTHESIA POSTPROCEDURE EVALUATION
Anesthesia POST Procedure Evaluation    Patient: Sweetie Carrero   MRN:     [id number] Gender:   male   Age:    68 year old :      1950        Preoperative Diagnosis: Extramammary Pagets Disease   Procedure(s):  Wide Local Excision of Scrotal Lesions   Postop Comments: No value filed.       Anesthesia Type:  MAC    Reportable Event: NO     PAIN: Uncomplicated   Sign Out status: Comfortable, Well controlled pain     PONV: No PONV   Sign Out status:  No Nausea or Vomiting     Neuro/Psych: Uneventful perioperative course   Sign Out Status: Preoperative baseline; Age appropriate mentation     Airway/Resp.: Uneventful perioperative course   Sign Out Status: Non labored breathing, age appropriate RR; Resp. Status within EXPECTED Parameters     CV: Uneventful perioperative course   Sign Out status: Appropriate BP and perfusion indices; Appropriate HR/Rhythm     Disposition:   Sign Out in:  PACU  Disposition:  Phase II; Home  Recovery Course: Uneventful  Follow-Up: Not required           Last Anesthesia Record Vitals:  CRNA VITALS  2018 1206 - 2018 1303      2018             Pulse: 70    SpO2: 99 %    Resp Rate (set): 10          Last PACU/Preop Vitals:  Vitals:    18 0953 18 1245 18 1300   BP: 145/86 104/65 121/70   Pulse: 78     Resp:  16 16   Temp:  36.7  C (98.1  F)    SpO2:  97% 98%         Electronically Signed By: Philip May DO, 2018, 1:03 PM

## 2018-12-05 NOTE — IP AVS SNAPSHOT
MRN:[id number]                      After Visit Summary   12/5/2018    Sweetie Carrero    MRN: [id number]           Thank you!     Thank you for choosing [facility] for your care. Our goal is always to provide you with excellent care. Hearing back from our patients is one way we can continue to improve our services. Please take a few minutes to complete the written survey that you may receive in the mail after you visit with us. Thank you!        Patient Information     Date Of Birth          1950        About your hospital stay     You were admitted on:  December 5, 2018 [facility]    You were discharged on:  December 5, 2018       Who to Call     For medical emergencies, please call 911.  For non-urgent questions about your medical care, please call your primary care provider or clinic, None  For questions related to your surgery, please call your surgery clinic        Attending Provider     Provider Fracisco Solorio MD Urology       Primary Care Provider Fax #    Physician No Ref-Primary [phone]      After Care Instructions     Discharge Instructions       Activity  - No strenuous exercise for 1 week.  - No lifting, pushing, pulling more than 15 pounds for 1 week.   - Do not strain with bowel movements.  - Do not drive until you can press the brake pedal quickly and fully without pain.   - Do not operate a motor vehicle while taking narcotic pain medications.     Incisions  - You may shower and get incisions wet starting 48 hrs after surgery.  - Do not scrub incisions or submerge wounds (bath, pool, hot tub, etc) for 2 weeks.   - Your stitches will fall out on their own in 2-4 weeks  - Leave incision open to air.  Cover with gauze only if needed for comfort or to protect clothing from drainage.     Medications  - Do not take any additional Tylenol (acetaminophen) while using Percocet or Vicodin.  - Do not take more  "than 4,000mg of Tylenol (acetaminophen) in any 24 hour period, as this can cause liver damage.  - Take stool softeners such as Senna while you are using narcotics, but stop if you develop diarrhea.                  Your next 10 appointments already scheduled     Jan 07, 2019  4:30 PM CST   (Arrive by 4:15 PM)   Post-Op with Reuben Villeda MD   [facility] ([facility])    [address]  4th Floor  [place] [facility] [address] [phone]              Further instructions from your care team       [facility]  Home Care Following Anesthesia  For 24 hours after surgery:  1. Get plenty of rest.  A responsible adult must stay with you for at least 24 hours after you leave the surgery center.  2. Do not drive or use heavy equipment.  If you have weakness or tingling, don't drive or use heavy equipment until this feeling goes away.   3. Do not drink alcohol.   4. Avoid strenuous or risky activities.  Ask for help when climbing stairs.  5. You may feel lightheaded.  IF so, sit for a few minutes before standing.  Have someone help you get up.   6. If you have nausea (feel sick to your stomach): Drink only clear liquids such as apple juice, ginger ale, broth or 7-Up.  Rest may also help.  Be sure to drink enough fluids.  Move to a regular diet as you feel able.   7. You may have a slight fever.  Call the doctor if your fever is over 100 F (37.7 C) (taken under the tongue) or lasts longer than 24 hours.  8. You may have a dry mouth, a sore throat, muscle aches or trouble sleeping. These should go away after 24 hours.  9. Do not make important or legal decisions.        Today you received a Marcaine or bupivacaine block to numb the nerves near your surgery site.  This is a block using local anesthetic or \"numbing\" medication injected around the nerves to anesthetize or \"numb\" the area supplied by those " nerves.  This block is injected into the muscle layer near your surgical site.  The medication may numb the location where you had surgery for 6-18 hours, but may last up to 24 hours.  If your surgical site is an arm or leg you should be careful with your affected limb, since it is possible to injure your limb without being aware of it due to the numbing.  Until full feeling returns, you should guard against bumping or hitting your limb, and avoid extreme hot or cold temperatures on the skin.  As the block wears off, the feeling will return as a tingling or prickly sensation near your surgical site.  You will experience more discomfort from your incision as the feeling returns.  You may want to take a pain pill (a narcotic or Tylenol if this was prescribed by your surgeon) when you start to experience mild pain before the pain beccomes more severe.  If your pain medications do not control your pain you should notifiy your surgeon.    Tips for taking pain medications  To get the best pain relief possible, remember these points:    Take pain medications as directed, before pain becomes severe.    Pain medication can upset your stomach: taking it with food may help.    Constipation is a common side effect of pain medication. Drink plenty of  fluids.    Eat foods high in fiber. Take a stool softener if recommended by your doctor or pharmacist.    Do not drink alcohol, drive or operate machinery while taking pain medications.    Ask about other ways to control pain, such as with heat, ice or relaxation.    Tylenol/Acetaminophen Consumption  To help encourage the safe use of acetaminophen, the makers of TYLENOL  have lowered the maximum daily dose for single-ingredient Extra Strength TYLENOL  (acetaminophen) products sold in the [place] from 8 pills per day (4,000 mg) to 6 pills per day (3,000 mg). The dosing interval has also changed from 2 pills every 4-6 hours to 2 pills every 6 hours.    If you feel your pain relief is  "insufficient, you may take Tylenol/Acetaminophen in addition to your narcotic pain medication.     Be careful not to exceed 3,000 mg of Tylenol/Acetaminophen in a 24 hour period from all sources.    If you are taking extra strength Tylenol/acetaminophen (500 mg), the maximum dose is 6 tablets in 24 hours.    If you are taking regular strength acetaminophen (325 mg), the maximum dose is 9 tablets in 24 hours.    Call a doctor for any of the followin. Signs of infection (fever, growing tenderness at the surgery site, a large amount of drainage or bleeding, severe pain, foul-smelling drainage, redness, swelling).  2. It has been over 8 to 10 hours since surgery and you are still not able to urinate (pass water).  3. Headache for over 24 hours.  4. Numbness, tingling or weakness the day after surgery (if you had spinal anesthesia).  Your doctor is:  Dr. Fracisco Casiano, Prostate and Urology: [phone]                    Or dial [phone] and ask for the resident on call for:  Prostate Urology  For emergency care, call the:  [facility]:  [phone] (TTY for hearing impaired: [phone])                  Pending Results     No orders found from 12/3/2018 to 2018.            Admission Information     Date & Time Provider Department Dept. Phone    2018 Fracisco Gtz MD [facility] [phone]      Your Vitals Were     Blood Pressure Pulse Temperature Respirations Height Weight    104/65 78 98.1  F (36.7  C) (Temporal) 16 1.702 m (5' 7\") 68 kg (150 lb)    Pulse Oximetry BMI (Body Mass Index)                97% 23.49 kg/m2          [email or web address] Information     [employer] gives you secure access to your electronic health record. If you see a primary care provider, you can also send messages to your care team and make appointments. If you have questions, please call your primary care clinic.  If you do not have a primary care provider, please call [phone] " and they will assist you.      [employer]ave[employer] is an electronic gateway that provides easy, online access to your medical records. With [employer]it, you can request a clinic appointment, read your test results, renew a prescription or communicate with your care team.     To access your existing account, please contact your [facility] or call [phone] for assistance.        Care EveryWhere ID     This is your Care EveryWhere ID. This could be used by other organizations to access your [facility] medical records  [id number]        Equal Access to Services     CHARANJIT CHOU : Hadii shakila mcarthur hadasho Soomaali, waaxda luqadaha, qaybta kaalmada adeegyada, ana suero . So [facility] [phone].    ATENCIÓN: Si habla español, tiene a armendariz disposición servicios gratuitos de asistencia lingüística. Llame al [phone].    We comply with applicable federal civil rights laws and [place] laws. We do not discriminate on the basis of race, color, national origin, age, disability, sex, sexual orientation, or gender identity.               Review of your medicines      START taking        Dose / Directions    oxyCODONE 5 MG tablet   Commonly known as:  ROXICODONE   Used for:  Scrotal lesion        Dose:  5 mg   Take 1 tablet (5 mg) by mouth every 6 hours as needed for severe pain   Quantity:  15 tablet   Refills:  0         CONTINUE these medicines which have NOT CHANGED        Dose / Directions    aspirin 81 MG EC tablet   Commonly known as:  ASA        Dose:  81 mg   Take 81 mg by mouth   Refills:  0       DIPHENHYDRAMINE HCL PO        Dose:  25 mg   Take 25 mg by mouth   Refills:  0       lisinopril-hydrochlorothiazide 20-12.5 MG tablet   Commonly known as:  PRINZIDE/ZESTORETIC        Refills:  0       mupirocin 2 % external ointment   Commonly known as:  BACTROBAN        Apply topically 3 times daily   Refills:  0       pravastatin 20 MG tablet   Commonly known as:  PRAVACHOL         TAKE ONE TABLET BY MOUTH ONCE DAILY   Refills:  0            Where to get your medicines      Some of these will need a paper prescription and others can be bought over the counter. Ask your nurse if you have questions.     Bring a paper prescription for each of these medications     oxyCODONE 5 MG tablet                Protect others around you: Learn how to safely use, store and throw away your medicines at www.disposemymeds.org.        Information about OPIOIDS     PRESCRIPTION OPIOIDS: WHAT YOU NEED TO KNOW   We gave you an opioid (narcotic) pain medicine. It is important to manage your pain, but opioids are not always the best choice. You should first try all the other options your care team gave you. Take this medicine for as short a time (and as few doses) as possible.    Some activities can increase your pain, such as bandage changes or therapy sessions. It may help to take your pain medicine 30 to 60 minutes before these activities. Reduce your stress by getting enough sleep, working on hobbies you enjoy and practicing relaxation or meditation. Talk to your care team about ways to manage your pain beyond prescription opioids.    These medicines have risks:    DO NOT drive when on new or higher doses of pain medicine. These medicines can affect your alertness and reaction times, and you could be arrested for driving under the influence (DUI). If you need to use opioids long-term, talk to your care team about driving.    DO NOT operate heavy machinery    DO NOT do any other dangerous activities while taking these medicines.    DO NOT drink any alcohol while taking these medicines.     If the opioid prescribed includes acetaminophen, DO NOT take with any other medicines that contain acetaminophen. Read all labels carefully. Look for the word  acetaminophen  or  Tylenol.  Ask your pharmacist if you have questions or are unsure.    You can get addicted to pain medicines, especially if you have a history of  addiction (chemical, alcohol or substance dependence). Talk to your care team about ways to reduce this risk.    All opioids tend to cause constipation. Drink plenty of water and eat foods that have a lot of fiber, such as fruits, vegetables, prune juice, apple juice and high-fiber cereal. Take a laxative (Miralax, milk of magnesia, Colace, Senna) if you don t move your bowels at least every other day. Other side effects include upset stomach, sleepiness, dizziness, throwing up, tolerance (needing more of the medicine to have the same effect), physical dependence and slowed breathing.    Store your pills in a secure place, locked if possible. We will not replace any lost or stolen medicine. If you don t finish your medicine, please throw away (dispose) as directed by your pharmacist. The [place] Pollution Control Agency has more information about safe disposal: https://www.pca.[facility].us/living-green/managing-unwanted-medications             Medication List: This is a list of all your medications and when to take them. Check marks below indicate your daily home schedule. Keep this list as a reference.      Medications           Morning Afternoon Evening Bedtime As Needed    aspirin 81 MG EC tablet   Commonly known as:  ASA   Take 81 mg by mouth                                DIPHENHYDRAMINE HCL PO   Take 25 mg by mouth                                lisinopril-hydrochlorothiazide 20-12.5 MG tablet   Commonly known as:  PRINZIDE/ZESTORETIC                                mupirocin 2 % external ointment   Commonly known as:  BACTROBAN   Apply topically 3 times daily                                oxyCODONE 5 MG tablet   Commonly known as:  ROXICODONE   Take 1 tablet (5 mg) by mouth every 6 hours as needed for severe pain                                pravastatin 20 MG tablet   Commonly known as:  PRAVACHOL   TAKE ONE TABLET BY MOUTH ONCE DAILY

## 2018-12-05 NOTE — DISCHARGE INSTRUCTIONS
"[facility]  Home Care Following Anesthesia  For 24 hours after surgery:  1. Get plenty of rest.  A responsible adult must stay with you for at least 24 hours after you leave the surgery center.  2. Do not drive or use heavy equipment.  If you have weakness or tingling, don't drive or use heavy equipment until this feeling goes away.   3. Do not drink alcohol.   4. Avoid strenuous or risky activities.  Ask for help when climbing stairs.  5. You may feel lightheaded.  IF so, sit for a few minutes before standing.  Have someone help you get up.   6. If you have nausea (feel sick to your stomach): Drink only clear liquids such as apple juice, ginger ale, broth or 7-Up.  Rest may also help.  Be sure to drink enough fluids.  Move to a regular diet as you feel able.   7. You may have a slight fever.  Call the doctor if your fever is over 100 F (37.7 C) (taken under the tongue) or lasts longer than 24 hours.  8. You may have a dry mouth, a sore throat, muscle aches or trouble sleeping. These should go away after 24 hours.  9. Do not make important or legal decisions.        Today you received a Marcaine or bupivacaine block to numb the nerves near your surgery site.  This is a block using local anesthetic or \"numbing\" medication injected around the nerves to anesthetize or \"numb\" the area supplied by those nerves.  This block is injected into the muscle layer near your surgical site.  The medication may numb the location where you had surgery for 6-18 hours, but may last up to 24 hours.  If your surgical site is an arm or leg you should be careful with your affected limb, since it is possible to injure your limb without being aware of it due to the numbing.  Until full feeling returns, you should guard against bumping or hitting your limb, and avoid extreme hot or cold temperatures on the skin.  As the block wears off, the feeling will return as a tingling or prickly sensation near your " surgical site.  You will experience more discomfort from your incision as the feeling returns.  You may want to take a pain pill (a narcotic or Tylenol if this was prescribed by your surgeon) when you start to experience mild pain before the pain beccomes more severe.  If your pain medications do not control your pain you should notifiy your surgeon.    Tips for taking pain medications  To get the best pain relief possible, remember these points:    Take pain medications as directed, before pain becomes severe.    Pain medication can upset your stomach: taking it with food may help.    Constipation is a common side effect of pain medication. Drink plenty of  fluids.    Eat foods high in fiber. Take a stool softener if recommended by your doctor or pharmacist.    Do not drink alcohol, drive or operate machinery while taking pain medications.    Ask about other ways to control pain, such as with heat, ice or relaxation.    Tylenol/Acetaminophen Consumption  To help encourage the safe use of acetaminophen, the makers of TYLENOL  have lowered the maximum daily dose for single-ingredient Extra Strength TYLENOL  (acetaminophen) products sold in the [place] from 8 pills per day (4,000 mg) to 6 pills per day (3,000 mg). The dosing interval has also changed from 2 pills every 4-6 hours to 2 pills every 6 hours.    If you feel your pain relief is insufficient, you may take Tylenol/Acetaminophen in addition to your narcotic pain medication.     Be careful not to exceed 3,000 mg of Tylenol/Acetaminophen in a 24 hour period from all sources.    If you are taking extra strength Tylenol/acetaminophen (500 mg), the maximum dose is 6 tablets in 24 hours.    If you are taking regular strength acetaminophen (325 mg), the maximum dose is 9 tablets in 24 hours.    Call a doctor for any of the followin. Signs of infection (fever, growing tenderness at the surgery site, a large amount of drainage or bleeding, severe pain, foul-smelling  drainage, redness, swelling).  2. It has been over 8 to 10 hours since surgery and you are still not able to urinate (pass water).  3. Headache for over 24 hours.  4. Numbness, tingling or weakness the day after surgery (if you had spinal anesthesia).  Your doctor is:  Dr. Fracisco Casiano, Prostate and Urology: [phone]                    Or dial [phone] and ask for the resident on call for:  Prostate Urology  For emergency care, call the:  [facility]:  [phone] (TTY for hearing impaired: [phone])

## 2018-12-07 ENCOUNTER — CARE COORDINATION (OUTPATIENT)
Dept: UROLOGY | Facility: CLINIC | Age: 68
End: 2018-12-07

## 2018-12-10 LAB — COPATH REPORT: NORMAL

## 2018-12-17 ENCOUNTER — OFFICE VISIT (OUTPATIENT)
Dept: UROLOGY | Facility: CLINIC | Age: 68
End: 2018-12-17
Payer: MEDICARE

## 2018-12-17 VITALS
DIASTOLIC BLOOD PRESSURE: 79 MMHG | BODY MASS INDEX: 24.23 KG/M2 | HEART RATE: 82 BPM | WEIGHT: 154.7 LBS | SYSTOLIC BLOOD PRESSURE: 135 MMHG

## 2018-12-17 DIAGNOSIS — C63.2: Primary | ICD-10-CM

## 2018-12-17 ASSESSMENT — PAIN SCALES - GENERAL: PAINLEVEL: NO PAIN (0)

## 2018-12-17 NOTE — OP NOTE
OPERATIVE NOTE    PREOPERATIVE DIAGNOSIS:   Paget's disease of the scrotum     POSTOPERATIVE DIAGNOSIS: same    PROCEDURES PERFORMED:   Wide local excision of the scrotal lesions     STAFF SURGEON: Fracisco Gtz MD  FELLOW: Reuben Villeda MD  RESIDENT(S): Patsy Monahan MD  ESTIMATED BLOOD LOSS: 5 mL.   IV FLUIDS: see dictated anesthesia record  COMPLICATIONS: None.   SPECIMEN:    Scrotal lesions       BRIEF OPERATIVE INDICATIONS: Sweetie Carrero is a 68 year old year old male with a recently discovered scrotal lesions. Biopsy in clinic showed paget's disease. Patient elected for local excision after understanding risk, benefits and alternatives.      Procedure in Detail: After informed consent was obtained, the patient was taken to the operating room and placed supine. Boots were applied, preoperative antibiotics were administered IV, and the genitals were prepped and draped in the supine position. A timeout was performed with the operating room staff.    Patient had three lesions on right, left and posterior scrotum by the perineum. Wide local excision was done for each lesion separately including small margin of normal looking skin, and skin was dissected off the dartos with combination of electrocautery and [place] scissors. We then used 3-0 vicryl to close the dartos layer and archived hemostasis, and closed the skin in horizontal mattress in running fashion.  Specimen was sent off for pathology.     The patient was awoken from anesthesia without complication and transported to recovery in stable condition.

## 2018-12-17 NOTE — NURSING NOTE
Chief Complaint   Patient presents with     RECHECK     discuss biopsy results       Blood pressure 135/79, pulse 82, weight 70.2 kg (154 lb 11.2 oz). Body mass index is 24.23 kg/m .    Patient Active Problem List   Diagnosis     Calculus of kidney     Essential hypertension     Malignant neoplasm of scrotum (H)     Extramammary Paget's disease of scrotum (H)     Hematuria     Hyperlipidemia     Microhematuria     Open wound of scalp     Tinea cruris     Type 2 diabetes mellitus without complications (H)       Allergies   Allergen Reactions     Atorvastatin Muscle Pain (Myalgia)     PN: weakness     Simvastatin Muscle Pain (Myalgia)     PN: weakness       Current Outpatient Medications   Medication Sig Dispense Refill     aspirin (ASA) 81 MG EC tablet Take 81 mg by mouth       DIPHENHYDRAMINE HCL PO Take 25 mg by mouth       mupirocin (BACTROBAN) 2 % ointment Apply topically 3 times daily       pravastatin (PRAVACHOL) 20 MG tablet TAKE ONE TABLET BY MOUTH ONCE DAILY       lisinopril-hydrochlorothiazide (PRINZIDE/ZESTORETIC) 20-12.5 MG per tablet        oxyCODONE (ROXICODONE) 5 MG tablet Take 1 tablet (5 mg) by mouth every 6 hours as needed for severe pain (Patient not taking: Reported on 12/17/2018) 15 tablet 0       Social History     Tobacco Use     Smoking status: Former Smoker     Smokeless tobacco: Never Used   Substance Use Topics     Alcohol use: Yes     Comment: rarely     Drug use: No       Marlys Ramon LPN  12/17/2018  5:15 PM

## 2018-12-17 NOTE — LETTER
12/17/2018       RE: Sweetie Carrero  [address] [place] [place] [facility] [address]     Dear Colleague,    Thank you for referring your patient, Sweetie Carrero, to the [facility] UROLOGY AND INST FOR PROSTATE AND UROLOGIC CANCERS at [facility]. Please see a copy of my visit note below.    Urology Clinic Note      Date: 12/17/2018  Time: 5:47 PM  Patient: Sweetie Carrero  MRN: [id number]    HPI/Subjective: Sweetie Carrero is a 68 year old male who presented with scrotal lesion, biopsy showed Paget's disease now s/p wide local excision       A. PERINEAL SKIN, EXCISION:   - Extramammary Paget's disease (EMPD)   - Focal areas of dermal invasion present   - Lymphovascular invasion is not identified   - Anterior, left, right, and deep margins are positive for in-situ disease     B. PERINEAL DEEP MARGIN, BIOPSY:   - Negative for EMPD     C. RIGHT PENOSCROTAL LESION, EXCISION:   - Extramammary Paget's disease (EMPD)   - Focal areas suspicious of dermal invasion   - Distal and right margins positive     D. LEFT PENOSCROTAL LESION, EXCISION:   - Extramammary Paget's disease (EMPD)   - Focal areas suspicious of dermal invasion   - Left, inferior, and superior margins positive     He has done really well after his surgery, minimal bleeding and pain    Objective:  /79   Pulse 82   Wt 70.2 kg (154 lb 11.2 oz)   BMI 24.23 kg/m     Gen: In NAD, conversant  Resp: Breathing non-labored  CV: Extremities warm, .  Abd: Soft, non-distended, non-tender  : incisions C/D/I, healing well     Assessment & Plan: Sweetie Carrero is a 68 year old male with extra Mammary pagets disease of scrotum, s/p wide resection but positive margins and in-situ disease     - continue to observe for 1 month while he heals    - discuss at tumorboard    - explained to him that best course of action is likely mapping biopsies in clinic in 1 month to plan later resection. These would be with punch biopsy and local in clinic and come from [facility]  thighs, perianal, suprapubic, penis.     - resection would be total scrotectomy if mapping biopsies are negative. Then STSG from thigh to testes.      Patsy Monahan MD  Urology PGY4    =======================================================  As the attending surgeon I, Fracisco Gtz, interviewed and examined the patient. The plan was developed between me and the patient. My findings and plan are as stated by the resident.    Fracisco Gtz MD

## 2018-12-17 NOTE — PROGRESS NOTES
Urology Clinic Note      Date: 12/17/2018  Time: 5:47 PM  Patient: Sweetie Carrero  MRN: [id number]    HPI/Subjective: Sweetie Carrero is a 68 year old male who presented with scrotal lesion, biopsy showed Paget's disease now s/p wide local excision       A. PERINEAL SKIN, EXCISION:   - Extramammary Paget's disease (EMPD)   - Focal areas of dermal invasion present   - Lymphovascular invasion is not identified   - Anterior, left, right, and deep margins are positive for in-situ disease     B. PERINEAL DEEP MARGIN, BIOPSY:   - Negative for EMPD     C. RIGHT PENOSCROTAL LESION, EXCISION:   - Extramammary Paget's disease (EMPD)   - Focal areas suspicious of dermal invasion   - Distal and right margins positive     D. LEFT PENOSCROTAL LESION, EXCISION:   - Extramammary Paget's disease (EMPD)   - Focal areas suspicious of dermal invasion   - Left, inferior, and superior margins positive     He has done really well after his surgery, minimal bleeding and pain      Objective:  /79   Pulse 82   Wt 70.2 kg (154 lb 11.2 oz)   BMI 24.23 kg/m    Gen: In NAD, conversant  Resp: Breathing non-labored  CV: Extremities warm, .  Abd: Soft, non-distended, non-tender  : incisions C/D/I, healing well     Assessment & Plan: Sweetie Carrero is a 68 year old male with extra Mammary pagets disease of scrotum, s/p wide resection but positive margins and in-situ disease     - continue to observe for 1 month while he heals    - discuss at tumorboard    - explained to him that best course of action is likely mapping biopsies in clinic in 1 month to plan later resection. These would be with punch biopsy and local in clinic and come from inner thighs, perianal, suprapubic, penis.     - resection would be total scrotectomy if mapping biopsies are negative. Then STSG from thigh to testes.      Patsy Monahan MD  Urology PGY4    =======================================================  As the attending surgeon I, Fracisco Gtz, interviewed and examined the  patient. The plan was developed between me and the patient. My findings and plan are as stated by the resident.    Fracisco Gtz MD

## 2018-12-19 ENCOUNTER — TELEPHONE (OUTPATIENT)
Dept: UROLOGY | Facility: CLINIC | Age: 68
End: 2018-12-19

## 2018-12-19 NOTE — TELEPHONE ENCOUNTER
Message left on patient voicemail stating the his post op appointment with Dr. Gtz was on 12/17/2018. We cancelled his post op with Dr. Villeda on 1/7/2019-since he had his post op with Dr. Gtz on 12/17/2018. Patient next appointment is on 1/28/2019 for a penile biopsy with Dr. Gtz.      Blayne Ellsworth MA

## 2018-12-19 NOTE — TELEPHONE ENCOUNTER
[facility] Call Center    Phone Message    May a detailed message be left on voicemail: yes    Reason for Call: Other: Pt had surgery on 12/5 and needs to schedule post op buyt does not remember the time frame to schedule. Dr. Casiano's first avaialble is not until end of january and Pt needs to be seen sooner he states. Please call Pt back.      Action Taken: Message routed to:  [facility] URO

## 2018-12-19 NOTE — TELEPHONE ENCOUNTER
Jennifer Gutierrez,    Do you know when this patient should follow up for his post op. He had an appointment with Dr. Villeda on 1/70/2019? Can you look into it and let me know?    Thanks, Blayne Ellsworth MA

## 2019-01-10 ENCOUNTER — PRE VISIT (OUTPATIENT)
Dept: UROLOGY | Facility: CLINIC | Age: 69
End: 2019-01-10

## 2019-01-10 NOTE — PROGRESS NOTES
Reason for visit: Penile biopsy     Relevant information: pagets disease of the scrotum with positive margins    Records/imaging/labs: all records available    Pt called: [employer] message sent    Rooming: Have punch biopsy, suture kit, formalin jar x 4, scalpel, and pick-ups with teeth on a sterile tray.     Prep penis, scrotum, perineum, and thighs with surgical soap, 5 towel drape.     For physician: sterile gown, size 7 1/2 sterile gloves.

## 2019-01-28 ENCOUNTER — OFFICE VISIT (OUTPATIENT)
Dept: UROLOGY | Facility: CLINIC | Age: 69
End: 2019-01-28
Payer: MEDICARE

## 2019-01-28 VITALS
HEIGHT: 67 IN | DIASTOLIC BLOOD PRESSURE: 78 MMHG | HEART RATE: 74 BPM | SYSTOLIC BLOOD PRESSURE: 138 MMHG | BODY MASS INDEX: 24.23 KG/M2

## 2019-01-28 DIAGNOSIS — C44.99: Primary | ICD-10-CM

## 2019-01-28 PROCEDURE — 88305 TISSUE EXAM BY PATHOLOGIST: CPT | Performed by: UROLOGY

## 2019-01-28 RX ORDER — LIDOCAINE HYDROCHLORIDE 10 MG/ML
30 INJECTION, SOLUTION EPIDURAL; INFILTRATION; INTRACAUDAL; PERINEURAL ONCE
Status: DISCONTINUED | OUTPATIENT
Start: 2019-01-28 | End: 2019-02-22

## 2019-01-28 ASSESSMENT — PAIN SCALES - GENERAL: PAINLEVEL: NO PAIN (0)

## 2019-01-28 NOTE — LETTER
1/28/2019     RE: Sweetie Carrero  [address] [facility] [address]     Dear Colleague,    Thank you for referring your patient, Sweetie Carrero, to the [facility] UROLOGY AND INST FOR PROSTATE AND UROLOGIC CANCERS at [facility]. Please see a copy of my visit note below.    Urology Clinic Note        Date: 12/17/2018  Time: 5:47 PM  Patient: Sweetie Carrero  MRN: [id number]     HPI/Subjective: Sweetie Carrero is a 68 year old male who presented with scrotal lesion, biopsy showed Paget's disease now s/p wide local excision. He presents today for mapping biopsies.     A. PERINEAL SKIN, EXCISION:   - Extramammary Paget's disease (EMPD)   - Focal areas of dermal invasion present   - Lymphovascular invasion is not identified   - Anterior, left, right, and deep margins are positive for in-situ disease     B. PERINEAL DEEP MARGIN, BIOPSY:   - Negative for EMPD     C. RIGHT PENOSCROTAL LESION, EXCISION:   - Extramammary Paget's disease (EMPD)   - Focal areas suspicious of dermal invasion   - Distal and right margins positive     D. LEFT PENOSCROTAL LESION, EXCISION:   - Extramammary Paget's disease (EMPD)   - Focal areas suspicious of dermal invasion   - Left, inferior, and superior margins positive      He has done really well after his surgery, minimal bleeding and pain     Objective:  /79   Pulse 82   Wt 70.2 kg (154 lb 11.2 oz)   BMI 24.23 kg/m    Gen: In NAD, conversant  Resp: Breathing non-labored  CV: Extremities warm, .  Abd: Soft, non-distended, non-tender  : incisions C/D/I, healing well      Procedure:   Informed consent was obtained. Local anesthesia was administered. The patient was prepped and draped in a sterile position. 4 mm bunch biopsies were obtained: left inguinal, right inguinal, left inner thigh, right inner thigh, and posterior midline perineum. Biopsy sites were cauterized and dissolvable sutures were used.     Rash to the left side of the scrotum has returned.      Assessment & Plan: Sweetie Carrero is a 68 year old male with extra Mammary pagets disease of scrotum, s/p wide resection but positive margins and dermal invasion.    Patient presents today for mapping biopsies. He should avoid exercising for two days. He can shower tomorrow and should avoid bathing for two days.     Today there is a new rash to the left side of the scrotum, indicating progression of aggressive disease. We discussed that all the skin of the scrotum and shaft of the penis needs to be removed. Could consider delayed grafting and rapid path review of margins if we want to try sparing the skin of the penis. Skin graft from the thigh will be used. Postoperative course was discussed. Pressure dressing would be used for 5 days postoperatively.      Topical chemotherapy and radiation were also discussed. We discussed that surgery is expected to have a better outcome.  Tissue damage from radiation may limit future surgical options. Radiation oncology consultation was discussed and a referral was offered. Mohs was also discussed, but this would not be appropriate given the size of involvement. Being discussed at tumor board.     If the posterior midline perineum biopsy is positive, I would refer the patient for colorectal surgery consultation. If biopsies from today returned positive, we would repeat a CT scan.     The patient would like to wait for biopsy results to return. He will then discuss with his wife and would like to discuss surgery once more over the phone. Patient requested information about skin graft care, which will be provided.     Scribe Disclosure:   I, Angelica Aguero, am serving as a scribe to document services personally performed by Fracisco Gtz MD at this visit, based upon the provider's statements to me. All documentation has been reviewed by the aforementioned provider prior to being entered into the official medical record.    As the attending surgeon I interviewed the  patient and performed all components of the exam and/or procedure. My interactions with the patient are reflected in the above note by the scribe.     After the procedure, I spent 25 minutes in conversation with his son and patient about management options for his disease. They continue to be suspicious about surgery. I stressed he has high risk disease.     Patient has not had a recent colonoscopy (2006). I don't see a PSA or CEA. It is debatable whether these have a true association with Pagets.     Again, thank you for allowing me to participate in the care of your patient.      Sincerely,    Fracisco Gtz MD

## 2019-01-28 NOTE — PROGRESS NOTES
Urology Clinic Note        Date: 12/17/2018  Time: 5:47 PM  Patient: Sweetie Carrero  MRN: [id number]     HPI/Subjective: Sweetie Carrero is a 68 year old male who presented with scrotal lesion, biopsy showed Paget's disease now s/p wide local excision. He presents today for mapping biopsies.     A. PERINEAL SKIN, EXCISION:   - Extramammary Paget's disease (EMPD)   - Focal areas of dermal invasion present   - Lymphovascular invasion is not identified   - Anterior, left, right, and deep margins are positive for in-situ disease     B. PERINEAL DEEP MARGIN, BIOPSY:   - Negative for EMPD     C. RIGHT PENOSCROTAL LESION, EXCISION:   - Extramammary Paget's disease (EMPD)   - Focal areas suspicious of dermal invasion   - Distal and right margins positive     D. LEFT PENOSCROTAL LESION, EXCISION:   - Extramammary Paget's disease (EMPD)   - Focal areas suspicious of dermal invasion   - Left, inferior, and superior margins positive      He has done really well after his surgery, minimal bleeding and pain     Objective:  /79   Pulse 82   Wt 70.2 kg (154 lb 11.2 oz)   BMI 24.23 kg/m    Gen: In NAD, conversant  Resp: Breathing non-labored  CV: Extremities warm, .  Abd: Soft, non-distended, non-tender  : incisions C/D/I, healing well      Procedure:   Informed consent was obtained. Local anesthesia was administered. The patient was prepped and draped in a sterile position. 4 mm bunch biopsies were obtained: left inguinal, right inguinal, left inner thigh, right inner thigh, and posterior midline perineum. Biopsy sites were cauterized and dissolvable sutures were used.     Rash to the left side of the scrotum has returned.     Assessment & Plan: Sweetie Carrero is a 68 year old male with extra Mammary pagets disease of scrotum, s/p wide resection but positive margins and dermal invasion.    Patient presents today for mapping biopsies. He should avoid exercising for two days. He can shower tomorrow and should avoid bathing for two  days.     Today there is a new rash to the left side of the scrotum, indicating progression of aggressive disease. We discussed that all the skin of the scrotum and shaft of the penis needs to be removed. Could consider delayed grafting and rapid path review of margins if we want to try sparing the skin of the penis. Skin graft from the thigh will be used. Postoperative course was discussed. Pressure dressing would be used for 5 days postoperatively.      Topical chemotherapy and radiation were also discussed. We discussed that surgery is expected to have a better outcome.  Tissue damage from radiation may limit future surgical options. Radiation oncology consultation was discussed and a referral was offered. Mohs was also discussed, but this would not be appropriate given the size of involvement. Being discussed at tumor board.     If the posterior midline perineum biopsy is positive, I would refer the patient for colorectal surgery consultation. If biopsies from today returned positive, we would repeat a CT scan.     The patient would like to wait for biopsy results to return. He will then discuss with his wife and would like to discuss surgery once more over the phone. Patient requested information about skin graft care, which will be provided.     Scribe Disclosure:   I, Angelica Aguero, am serving as a scribe to document services personally performed by Fracisco Gtz MD at this visit, based upon the provider's statements to me. All documentation has been reviewed by the aforementioned provider prior to being entered into the official medical record.    As the attending surgeon I interviewed the patient and performed all components of the exam and/or procedure. My interactions with the patient are reflected in the above note by the scribe.     After the procedure, I spent 25 minutes in conversation with his son and patient about management options for his disease. They continue to be suspicious about  surgery. I stressed he has high risk disease.     Patient has not had a recent colonoscopy (2006). I don't see a PSA or CEA. It is debatable whether these have a true association with Pagets.

## 2019-01-28 NOTE — NURSING NOTE
"No chief complaint on file.      Blood pressure 138/78, pulse 74, height 1.702 m (5' 7\"). Body mass index is 24.23 kg/m .    Patient Active Problem List   Diagnosis     Calculus of kidney     Essential hypertension     Malignant neoplasm of scrotum (H)     Extramammary Paget's disease of scrotum (H)     Hematuria     Hyperlipidemia     Microhematuria     Open wound of scalp     Tinea cruris     Type 2 diabetes mellitus without complications (H)       Allergies   Allergen Reactions     Atorvastatin Muscle Pain (Myalgia)     PN: weakness     Simvastatin Muscle Pain (Myalgia)     PN: weakness       Current Outpatient Medications   Medication Sig Dispense Refill     aspirin (ASA) 81 MG EC tablet Take 81 mg by mouth       DIPHENHYDRAMINE HCL PO Take 25 mg by mouth       lisinopril-hydrochlorothiazide (PRINZIDE/ZESTORETIC) 20-12.5 MG per tablet        mupirocin (BACTROBAN) 2 % ointment Apply topically 3 times daily       oxyCODONE (ROXICODONE) 5 MG tablet Take 1 tablet (5 mg) by mouth every 6 hours as needed for severe pain (Patient not taking: Reported on 2018) 15 tablet 0     pravastatin (PRAVACHOL) 20 MG tablet TAKE ONE TABLET BY MOUTH ONCE DAILY         Social History     Tobacco Use     Smoking status: Former Smoker     Smokeless tobacco: Never Used   Substance Use Topics     Alcohol use: Yes     Comment: rarely     Drug use: No       ARMANI Gooden  2019  9:35 AM     Invasive Procedure Safety Checklist:    Procedure: biopsy    Action: Complete sections and checkboxes as appropriate.    Pre-procedure:  1. Patient ID Verified with 2 identifiers (Maddi and  or MRN) : YES    2. Procedure and site verified with patient/designee (when able) : YES    3. Accurate consent documentation in medical record : YES    4. H&P (or appropriate assessment) documented in medical record : YES  H&P must be up to 30 days prior to procedure an updated within 24 hours of                 Procedure as applicable.     5. " Relevant diagnostic and radiology test results appropriately labeled and displayed as applicable : YES    6. Blood products, implants, devices, and/or special equipment available for the procedure as applicable : YES    7. Procedure site(s) marked with provider initials [Exclusions: None] : NO    8. Marking not required. Reason : Yes  Procedure does not require site marking    Time Out:     Time-Out performed immediately prior to starting procedure, including verbal and active participation of all team members addressing: YES    1. Correct patient identity.  2. Confirmed that the correct side and site are marked.  3. An accurate procedure to be done.  4. Agreement on the procedure to be done.  5. Correct patient position.  6. Relevant images and results are properly labeled and appropriately displayed.  7. The need to administer antibiotics or fluids for irrigation purposes during the procedure as applicable.  8. Safety precautions based on patient history or medication use.    During Procedure: Verification of correct person, site, and procedure occurs any time the responsibility for care of the patient is transferred to another member of the care team.      The following medication was given:     MEDICATION:  Lidocaine without epinephrine  ROUTE: administered by physician   SITE: Administered by physician   DOSE: 10 mL  LOT #: [id number]  : [employer] [facility]  EXPIRATION DATE: 07/22  NDC#: [address]-58   Was there drug waste? Yes  Amount of drug waste (mL): 20 mL.  Reason for waste:  Single use vial    Prior to the injection of lidocaine for the procedure, verified patient identity using patient's name and date of birth.  Due to injection administration, patient instructed to remain in clinic for 15 minutes  afterwards, and to report any adverse reaction to me immediately.    Drug Amount Wasted:  None.  Vial/Syringe: Single dose vial    Chyna Tolentino CMA  January 28, 2019

## 2019-01-29 LAB — COPATH REPORT: NORMAL

## 2019-02-11 ENCOUNTER — TELEPHONE (OUTPATIENT)
Dept: UROLOGY | Facility: CLINIC | Age: 69
End: 2019-02-11

## 2019-02-11 DIAGNOSIS — C63.2 EXTRAMAMMARY PAGET'S DISEASE OF SCROTUM (H): Primary | ICD-10-CM

## 2019-02-11 RX ORDER — CEFAZOLIN SODIUM 1 G/50ML
1 INJECTION, SOLUTION INTRAVENOUS SEE ADMIN INSTRUCTIONS
Status: CANCELLED | OUTPATIENT
Start: 2019-02-11

## 2019-02-11 RX ORDER — CEFAZOLIN SODIUM 2 G/50ML
2 SOLUTION INTRAVENOUS
Status: CANCELLED | OUTPATIENT
Start: 2019-02-11

## 2019-02-11 NOTE — TELEPHONE ENCOUNTER
[facility] Call Center    Phone Message    May a detailed message be left on voicemail: yes    Reason for Call: Other: Men calling to schedule his surgery as discussed at his last office visit.  he does have a few questions and would appreciate a call back today to discuss (He is traveling tomorrow back to [place]).     Action Taken: Message routed to:  [facility] ([facility]  Urology

## 2019-02-13 ENCOUNTER — TELEPHONE (OUTPATIENT)
Dept: UROLOGY | Facility: CLINIC | Age: 69
End: 2019-02-13

## 2019-02-13 ENCOUNTER — CARE COORDINATION (OUTPATIENT)
Dept: UROLOGY | Facility: CLINIC | Age: 69
End: 2019-02-13

## 2019-02-13 NOTE — TELEPHONE ENCOUNTER
Patient is scheduled for surgery with Dr. Gtz      Spoke or left message with: Men    Date of Surgery: 2/21/19    Location: [facility] OR    Informed patient they will need an adult  yes    Pre-op with surgeon (if applicable): n/a    H&P: Scheduled with pcp    Additional imaging/appointments: n/a    Surgery packet: sent via [employer]     Additional comments: n/a

## 2019-02-17 ENCOUNTER — MYC MEDICAL ADVICE (OUTPATIENT)
Dept: UROLOGY | Facility: CLINIC | Age: 69
End: 2019-02-17

## 2019-02-18 ENCOUNTER — OFFICE VISIT (OUTPATIENT)
Dept: UROLOGY | Facility: CLINIC | Age: 69
End: 2019-02-18
Payer: MEDICARE

## 2019-02-18 VITALS
DIASTOLIC BLOOD PRESSURE: 84 MMHG | WEIGHT: 155 LBS | SYSTOLIC BLOOD PRESSURE: 141 MMHG | HEART RATE: 81 BPM | BODY MASS INDEX: 24.28 KG/M2

## 2019-02-18 DIAGNOSIS — C44.99: Primary | ICD-10-CM

## 2019-02-18 ASSESSMENT — PAIN SCALES - GENERAL: PAINLEVEL: NO PAIN (0)

## 2019-02-18 NOTE — PROGRESS NOTES
Urology Clinic Note        Date: 12/17/2018  Time: 5:47 PM  Patient: Sweetie Carrero  MRN: [id number]     HPI/Subjective: Sweetie Carrero is a 68 year old male who presented with scrotal lesion, biopsy showed Paget's disease now s/p wide local excision 12/5/18:     A. PERINEAL SKIN, EXCISION:   - Extramammary Paget's disease (EMPD)   - Focal areas of dermal invasion present   - Lymphovascular invasion is not identified   - Anterior, left, right, and deep margins are positive for in-situ disease     B. PERINEAL DEEP MARGIN, BIOPSY:   - Negative for EMPD     C. RIGHT PENOSCROTAL LESION, EXCISION:   - Extramammary Paget's disease (EMPD)   - Focal areas suspicious of dermal invasion   - Distal and right margins positive     D. LEFT PENOSCROTAL LESION, EXCISION:   - Extramammary Paget's disease (EMPD)   - Focal areas suspicious of dermal invasion   - Left, inferior, and superior margins positive      He has done really well after his surgery, but due to the pathology had mapping biopsies 1/28/19:    A. Skin, left inguinal:   - Benign unremarkable skin   - Negative for Paget disease     B. Skin,, right inguinal:   - Benign unremarkable skin   - Negative for Paget disease     C. Skin, left inner thigh:   - Benign unremarkable skin   - Negative for Paget disease     D. Skin, right inner thigh:   - Benign unremarkable skin   - Negative for Paget disease     E. Skin, midline posterior perineum:   - Benign unremarkable skin   - Negative for Paget disease     He was presented at tumor board, where he was recommended to undergo further excision with skin grafting.     Objective:  /79   Pulse 82   Wt 70.2 kg (154 lb 11.2 oz)   BMI 24.23 kg/m    Gen: In NAD, conversant  Resp: Breathing non-labored  CV: Extremities warm, .  Abd: Soft, non-distended, non-tender  : incisions C/D/I, healing well      Assessment & Plan: Sweetie Carrero is a 68 year old male with extra Mammary pagets disease of scrotum, s/p wide resection but positive  margins and dermal invasion.    Following biopsy results, the patient would like to proceed with further resection of entire scrotal skin and skin grafting as recommended. He presented today to further discuss the procedure and all questions were answered.

## 2019-02-18 NOTE — NURSING NOTE
Pt has a hx of pagets disease of the scrotum with pos. Margins; surgery scheduled on 2/21/19    He is here to talk to Dr. Casiano about his upcoming procedure (removal of penile and scrotal skin)      Chief Complaint   Patient presents with     RECHECK     Discuss removal of penile and scrotal skin       There were no vitals taken for this visit. There is no height or weight on file to calculate BMI.    Patient Active Problem List   Diagnosis     Calculus of kidney     Essential hypertension     Malignant neoplasm of scrotum (H)     Extramammary Paget's disease of scrotum (H)     Hematuria     Hyperlipidemia     Microhematuria     Open wound of scalp     Tinea cruris     Type 2 diabetes mellitus without complications (H)       Allergies   Allergen Reactions     Atorvastatin Muscle Pain (Myalgia)     PN: weakness     Simvastatin Muscle Pain (Myalgia)     PN: weakness       Current Outpatient Medications   Medication Sig Dispense Refill     lisinopril-hydrochlorothiazide (PRINZIDE/ZESTORETIC) 20-12.5 MG per tablet        mupirocin (BACTROBAN) 2 % ointment Apply topically 3 times daily       pravastatin (PRAVACHOL) 20 MG tablet TAKE ONE TABLET BY MOUTH ONCE DAILY       aspirin (ASA) 81 MG EC tablet Take 81 mg by mouth       DIPHENHYDRAMINE HCL PO Take 25 mg by mouth       oxyCODONE (ROXICODONE) 5 MG tablet Take 1 tablet (5 mg) by mouth every 6 hours as needed for severe pain (Patient not taking: Reported on 12/17/2018) 15 tablet 0       Social History     Tobacco Use     Smoking status: Former Smoker     Smokeless tobacco: Never Used   Substance Use Topics     Alcohol use: Yes     Comment: rarely     Drug use: No       Cristian Cifuentes, EMT  2/18/2019  10:56 AM

## 2019-02-18 NOTE — LETTER
RE: Sweetie Carrero  [address]  [facility] [address]     Dear Colleague,    Thank you for referring your patient, Sweetie Carrero, to the [facility] UROLOGY AND INST FOR PROSTATE AND UROLOGIC CANCERS at [facility]. Please see a copy of my visit note below.    Urology Clinic Note   Date: 12/17/2018  Time: 5:47 PM  Patient: Sweetie Carrero  MRN: [id number]     HPI/Subjective: Sweetie Carrero is a 68 year old male who presented with scrotal lesion, biopsy showed Paget's disease now s/p wide local excision 12/5/18:     A. PERINEAL SKIN, EXCISION:   - Extramammary Paget's disease (EMPD)   - Focal areas of dermal invasion present   - Lymphovascular invasion is not identified   - Anterior, left, right, and deep margins are positive for in-situ disease     B. PERINEAL DEEP MARGIN, BIOPSY:   - Negative for EMPD     C. RIGHT PENOSCROTAL LESION, EXCISION:   - Extramammary Paget's disease (EMPD)   - Focal areas suspicious of dermal invasion   - Distal and right margins positive     D. LEFT PENOSCROTAL LESION, EXCISION:   - Extramammary Paget's disease (EMPD)   - Focal areas suspicious of dermal invasion   - Left, inferior, and superior margins positive      He has done really well after his surgery, but due to the pathology had mapping biopsies 1/28/19:    A. Skin, left inguinal:   - Benign unremarkable skin   - Negative for Paget disease     B. Skin,, right inguinal:   - Benign unremarkable skin   - Negative for Paget disease     C. Skin, left inner thigh:   - Benign unremarkable skin   - Negative for Paget disease     D. Skin, right inner thigh:   - Benign unremarkable skin   - Negative for Paget disease     E. Skin, midline posterior perineum:   - Benign unremarkable skin   - Negative for Paget disease     He was presented at tumor board, where he was recommended to undergo further excision with skin grafting.     Objective:  /79   Pulse 82   Wt 70.2 kg (154 lb 11.2 oz)   BMI 24.23  kg/m    Gen: In NAD, conversant  Resp: Breathing non-labored  CV: Extremities warm, .  Abd: Soft, non-distended, non-tender  : incisions C/D/I, healing well      Assessment & Plan: Sweetie Carrero is a 68 year old male with extra Mammary pagets disease of scrotum, s/p wide resection but positive margins and dermal invasion.    Following biopsy results, the patient would like to proceed with further resection of entire scrotal skin and skin grafting as recommended. He presented today to further discuss the procedure and all questions were answered.     Again, thank you for allowing me to participate in the care of your patient.      Sincerely,    Fracisco Gtz MD

## 2019-02-19 RX ORDER — LISINOPRIL 40 MG/1
40 TABLET ORAL DAILY
COMMUNITY

## 2019-02-21 ENCOUNTER — HOSPITAL ENCOUNTER (INPATIENT)
Facility: CLINIC | Age: 69
LOS: 2 days | Discharge: HOME | DRG: 712 | End: 2019-02-23
Attending: UROLOGY | Admitting: UROLOGY
Payer: MEDICARE

## 2019-02-21 ENCOUNTER — ANESTHESIA EVENT (OUTPATIENT)
Dept: SURGERY | Facility: CLINIC | Age: 69
DRG: 712 | End: 2019-02-21
Payer: MEDICARE

## 2019-02-21 ENCOUNTER — ANESTHESIA (OUTPATIENT)
Dept: SURGERY | Facility: CLINIC | Age: 69
DRG: 712 | End: 2019-02-21
Payer: MEDICARE

## 2019-02-21 DIAGNOSIS — L29.9 ITCHING: ICD-10-CM

## 2019-02-21 DIAGNOSIS — J11.1 INFLUENZA WITH RESPIRATORY MANIFESTATION OTHER THAN PNEUMONIA: Primary | ICD-10-CM

## 2019-02-21 DIAGNOSIS — C63.2 EXTRAMAMMARY PAGET'S DISEASE OF SCROTUM (H): ICD-10-CM

## 2019-02-21 DIAGNOSIS — G89.18 POST-OP PAIN: ICD-10-CM

## 2019-02-21 LAB
CREAT SERPL-MCNC: 0.77 MG/DL (ref 0.66–1.25)
GFR SERPL CREATININE-BSD FRML MDRD: >90 ML/MIN/{1.73_M2}
GLUCOSE BLDC GLUCOMTR-MCNC: 111 MG/DL (ref 70–99)
GLUCOSE BLDC GLUCOMTR-MCNC: 92 MG/DL (ref 70–99)
HGB BLD-MCNC: 15.5 G/DL (ref 13.3–17.7)
POTASSIUM SERPL-SCNC: 4 MMOL/L (ref 3.4–5.3)

## 2019-02-21 PROCEDURE — 36415 COLL VENOUS BLD VENIPUNCTURE: CPT | Performed by: ANESTHESIOLOGY

## 2019-02-21 PROCEDURE — 0HBJXZZ EXCISION OF LEFT UPPER LEG SKIN, EXTERNAL APPROACH: ICD-10-PCS | Performed by: UROLOGY

## 2019-02-21 PROCEDURE — 25000125 ZZHC RX 250: Performed by: NURSE ANESTHETIST, CERTIFIED REGISTERED

## 2019-02-21 PROCEDURE — 40000170 ZZH STATISTIC PRE-PROCEDURE ASSESSMENT II: Performed by: UROLOGY

## 2019-02-21 PROCEDURE — 25000128 H RX IP 250 OP 636: Performed by: NURSE ANESTHETIST, CERTIFIED REGISTERED

## 2019-02-21 PROCEDURE — 25000565 ZZH ISOFLURANE, EA 15 MIN: Performed by: UROLOGY

## 2019-02-21 PROCEDURE — 93005 ELECTROCARDIOGRAM TRACING: CPT

## 2019-02-21 PROCEDURE — 0HRAX74 REPLACEMENT OF INGUINAL SKIN WITH AUTOLOGOUS TISSUE SUBSTITUTE, PARTIAL THICKNESS, EXTERNAL APPROACH: ICD-10-PCS | Performed by: UROLOGY

## 2019-02-21 PROCEDURE — 27210794 ZZH OR GENERAL SUPPLY STERILE: Performed by: UROLOGY

## 2019-02-21 PROCEDURE — 0VSC0ZZ REPOSITION BILATERAL TESTES, OPEN APPROACH: ICD-10-PCS | Performed by: UROLOGY

## 2019-02-21 PROCEDURE — 36000059 ZZH SURGERY LEVEL 3 EA 15 ADDTL MIN UMMC: Performed by: UROLOGY

## 2019-02-21 PROCEDURE — 27210995 ZZH RX 272: Performed by: UROLOGY

## 2019-02-21 PROCEDURE — 71000014 ZZH RECOVERY PHASE 1 LEVEL 2 FIRST HR: Performed by: UROLOGY

## 2019-02-21 PROCEDURE — 84132 ASSAY OF SERUM POTASSIUM: CPT | Performed by: ANESTHESIOLOGY

## 2019-02-21 PROCEDURE — 82565 ASSAY OF CREATININE: CPT | Performed by: ANESTHESIOLOGY

## 2019-02-21 PROCEDURE — 88307 TISSUE EXAM BY PATHOLOGIST: CPT | Performed by: UROLOGY

## 2019-02-21 PROCEDURE — 25000132 ZZH RX MED GY IP 250 OP 250 PS 637: Mod: GY | Performed by: STUDENT IN AN ORGANIZED HEALTH CARE EDUCATION/TRAINING PROGRAM

## 2019-02-21 PROCEDURE — A9270 NON-COVERED ITEM OR SERVICE: HCPCS | Mod: GY | Performed by: ANESTHESIOLOGY

## 2019-02-21 PROCEDURE — A9270 NON-COVERED ITEM OR SERVICE: HCPCS | Mod: GY | Performed by: STUDENT IN AN ORGANIZED HEALTH CARE EDUCATION/TRAINING PROGRAM

## 2019-02-21 PROCEDURE — 25000128 H RX IP 250 OP 636: Performed by: UROLOGY

## 2019-02-21 PROCEDURE — 25800030 ZZH RX IP 258 OP 636: Performed by: ANESTHESIOLOGY

## 2019-02-21 PROCEDURE — 93010 ELECTROCARDIOGRAM REPORT: CPT | Performed by: INTERNAL MEDICINE

## 2019-02-21 PROCEDURE — 37000009 ZZH ANESTHESIA TECHNICAL FEE, EACH ADDTL 15 MIN: Performed by: UROLOGY

## 2019-02-21 PROCEDURE — 36000057 ZZH SURGERY LEVEL 3 1ST 30 MIN - UMMC: Performed by: UROLOGY

## 2019-02-21 PROCEDURE — 85018 HEMOGLOBIN: CPT | Performed by: ANESTHESIOLOGY

## 2019-02-21 PROCEDURE — 12000001 ZZH R&B MED SURG/OB UMMC

## 2019-02-21 PROCEDURE — 25800030 ZZH RX IP 258 OP 636: Performed by: STUDENT IN AN ORGANIZED HEALTH CARE EDUCATION/TRAINING PROGRAM

## 2019-02-21 PROCEDURE — 37000008 ZZH ANESTHESIA TECHNICAL FEE, 1ST 30 MIN: Performed by: UROLOGY

## 2019-02-21 PROCEDURE — 00000146 ZZHCL STATISTIC GLUCOSE BY METER IP

## 2019-02-21 PROCEDURE — 25000128 H RX IP 250 OP 636: Performed by: STUDENT IN AN ORGANIZED HEALTH CARE EDUCATION/TRAINING PROGRAM

## 2019-02-21 PROCEDURE — 25000132 ZZH RX MED GY IP 250 OP 250 PS 637: Mod: GY | Performed by: ANESTHESIOLOGY

## 2019-02-21 PROCEDURE — 25000125 ZZHC RX 250: Performed by: UROLOGY

## 2019-02-21 RX ORDER — LIDOCAINE 40 MG/G
CREAM TOPICAL
Status: DISCONTINUED | OUTPATIENT
Start: 2019-02-21 | End: 2019-02-21 | Stop reason: HOSPADM

## 2019-02-21 RX ORDER — LABETALOL HYDROCHLORIDE 5 MG/ML
INJECTION, SOLUTION INTRAVENOUS PRN
Status: DISCONTINUED | OUTPATIENT
Start: 2019-02-21 | End: 2019-02-21

## 2019-02-21 RX ORDER — ACETAMINOPHEN 325 MG/1
975 TABLET ORAL ONCE
Status: COMPLETED | OUTPATIENT
Start: 2019-02-21 | End: 2019-02-21

## 2019-02-21 RX ORDER — NALOXONE HYDROCHLORIDE 0.4 MG/ML
.1-.4 INJECTION, SOLUTION INTRAMUSCULAR; INTRAVENOUS; SUBCUTANEOUS
Status: DISCONTINUED | OUTPATIENT
Start: 2019-02-21 | End: 2019-02-23 | Stop reason: HOSPADM

## 2019-02-21 RX ORDER — LIDOCAINE 40 MG/G
CREAM TOPICAL
Status: DISCONTINUED | OUTPATIENT
Start: 2019-02-21 | End: 2019-02-23 | Stop reason: HOSPADM

## 2019-02-21 RX ORDER — NALOXONE HYDROCHLORIDE 0.4 MG/ML
.1-.4 INJECTION, SOLUTION INTRAMUSCULAR; INTRAVENOUS; SUBCUTANEOUS
Status: DISCONTINUED | OUTPATIENT
Start: 2019-02-21 | End: 2019-02-22

## 2019-02-21 RX ORDER — FENTANYL CITRATE 50 UG/ML
25-50 INJECTION, SOLUTION INTRAMUSCULAR; INTRAVENOUS EVERY 5 MIN PRN
Status: DISCONTINUED | OUTPATIENT
Start: 2019-02-21 | End: 2019-02-21 | Stop reason: HOSPADM

## 2019-02-21 RX ORDER — SODIUM CHLORIDE, SODIUM LACTATE, POTASSIUM CHLORIDE, CALCIUM CHLORIDE 600; 310; 30; 20 MG/100ML; MG/100ML; MG/100ML; MG/100ML
INJECTION, SOLUTION INTRAVENOUS CONTINUOUS
Status: DISCONTINUED | OUTPATIENT
Start: 2019-02-21 | End: 2019-02-21 | Stop reason: HOSPADM

## 2019-02-21 RX ORDER — ACETAMINOPHEN 325 MG/1
975 TABLET ORAL EVERY 8 HOURS
Status: DISCONTINUED | OUTPATIENT
Start: 2019-02-21 | End: 2019-02-23 | Stop reason: HOSPADM

## 2019-02-21 RX ORDER — EPINEPHRINE 1 MG/ML
INJECTION, SOLUTION, CONCENTRATE INTRAVENOUS PRN
Status: DISCONTINUED | OUTPATIENT
Start: 2019-02-21 | End: 2019-02-21 | Stop reason: HOSPADM

## 2019-02-21 RX ORDER — HYDROMORPHONE HYDROCHLORIDE 1 MG/ML
.3-.5 INJECTION, SOLUTION INTRAMUSCULAR; INTRAVENOUS; SUBCUTANEOUS
Status: DISCONTINUED | OUTPATIENT
Start: 2019-02-21 | End: 2019-02-23 | Stop reason: HOSPADM

## 2019-02-21 RX ORDER — SODIUM CHLORIDE 9 MG/ML
INJECTION, SOLUTION INTRAVENOUS CONTINUOUS
Status: DISCONTINUED | OUTPATIENT
Start: 2019-02-21 | End: 2019-02-22 | Stop reason: CLARIF

## 2019-02-21 RX ORDER — LIDOCAINE HYDROCHLORIDE 20 MG/ML
INJECTION, SOLUTION INFILTRATION; PERINEURAL PRN
Status: DISCONTINUED | OUTPATIENT
Start: 2019-02-21 | End: 2019-02-21

## 2019-02-21 RX ORDER — CEFAZOLIN SODIUM 1 G/3ML
1 INJECTION, POWDER, FOR SOLUTION INTRAMUSCULAR; INTRAVENOUS SEE ADMIN INSTRUCTIONS
Status: DISCONTINUED | OUTPATIENT
Start: 2019-02-21 | End: 2019-02-21 | Stop reason: HOSPADM

## 2019-02-21 RX ORDER — ONDANSETRON 4 MG/1
4 TABLET, ORALLY DISINTEGRATING ORAL EVERY 30 MIN PRN
Status: DISCONTINUED | OUTPATIENT
Start: 2019-02-21 | End: 2019-02-21 | Stop reason: HOSPADM

## 2019-02-21 RX ORDER — CEFAZOLIN SODIUM 2 G/100ML
2 INJECTION, SOLUTION INTRAVENOUS
Status: COMPLETED | OUTPATIENT
Start: 2019-02-21 | End: 2019-02-21

## 2019-02-21 RX ORDER — PROPOFOL 10 MG/ML
INJECTION, EMULSION INTRAVENOUS PRN
Status: DISCONTINUED | OUTPATIENT
Start: 2019-02-21 | End: 2019-02-21

## 2019-02-21 RX ORDER — PRAVASTATIN SODIUM 10 MG
20 TABLET ORAL DAILY
Status: DISCONTINUED | OUTPATIENT
Start: 2019-02-21 | End: 2019-02-23 | Stop reason: HOSPADM

## 2019-02-21 RX ORDER — MINERAL OIL
OIL (ML) MISCELLANEOUS PRN
Status: DISCONTINUED | OUTPATIENT
Start: 2019-02-21 | End: 2019-02-21 | Stop reason: HOSPADM

## 2019-02-21 RX ORDER — NALOXONE HYDROCHLORIDE 0.4 MG/ML
.1-.4 INJECTION, SOLUTION INTRAMUSCULAR; INTRAVENOUS; SUBCUTANEOUS
Status: DISCONTINUED | OUTPATIENT
Start: 2019-02-21 | End: 2019-02-21 | Stop reason: HOSPADM

## 2019-02-21 RX ORDER — MEPERIDINE HYDROCHLORIDE 50 MG/ML
12.5 INJECTION INTRAMUSCULAR; INTRAVENOUS; SUBCUTANEOUS
Status: DISCONTINUED | OUTPATIENT
Start: 2019-02-21 | End: 2019-02-21 | Stop reason: HOSPADM

## 2019-02-21 RX ORDER — ACETAMINOPHEN 325 MG/1
650 TABLET ORAL EVERY 4 HOURS PRN
Status: DISCONTINUED | OUTPATIENT
Start: 2019-02-24 | End: 2019-02-23 | Stop reason: HOSPADM

## 2019-02-21 RX ORDER — FENTANYL CITRATE 50 UG/ML
INJECTION, SOLUTION INTRAMUSCULAR; INTRAVENOUS PRN
Status: DISCONTINUED | OUTPATIENT
Start: 2019-02-21 | End: 2019-02-21

## 2019-02-21 RX ORDER — ONDANSETRON 2 MG/ML
4 INJECTION INTRAMUSCULAR; INTRAVENOUS EVERY 30 MIN PRN
Status: DISCONTINUED | OUTPATIENT
Start: 2019-02-21 | End: 2019-02-21 | Stop reason: HOSPADM

## 2019-02-21 RX ORDER — OXYCODONE HYDROCHLORIDE 5 MG/1
5-10 TABLET ORAL
Status: DISCONTINUED | OUTPATIENT
Start: 2019-02-21 | End: 2019-02-23 | Stop reason: HOSPADM

## 2019-02-21 RX ORDER — ONDANSETRON 2 MG/ML
INJECTION INTRAMUSCULAR; INTRAVENOUS PRN
Status: DISCONTINUED | OUTPATIENT
Start: 2019-02-21 | End: 2019-02-21

## 2019-02-21 RX ORDER — HYDROMORPHONE HYDROCHLORIDE 1 MG/ML
.3-.5 INJECTION, SOLUTION INTRAMUSCULAR; INTRAVENOUS; SUBCUTANEOUS EVERY 10 MIN PRN
Status: DISCONTINUED | OUTPATIENT
Start: 2019-02-21 | End: 2019-02-21 | Stop reason: HOSPADM

## 2019-02-21 RX ADMIN — FENTANYL CITRATE 50 MCG: 50 INJECTION, SOLUTION INTRAMUSCULAR; INTRAVENOUS at 11:03

## 2019-02-21 RX ADMIN — FENTANYL CITRATE 50 MCG: 50 INJECTION, SOLUTION INTRAMUSCULAR; INTRAVENOUS at 10:47

## 2019-02-21 RX ADMIN — LABETALOL HYDROCHLORIDE 10 MG: 5 INJECTION INTRAVENOUS at 11:33

## 2019-02-21 RX ADMIN — ROCURONIUM BROMIDE 10 MG: 10 INJECTION INTRAVENOUS at 12:25

## 2019-02-21 RX ADMIN — PRAVASTATIN SODIUM 20 MG: 10 TABLET ORAL at 19:55

## 2019-02-21 RX ADMIN — Medication 100 MG: at 10:34

## 2019-02-21 RX ADMIN — LABETALOL HYDROCHLORIDE 10 MG: 5 INJECTION INTRAVENOUS at 13:29

## 2019-02-21 RX ADMIN — Medication 0.3 MG: at 19:58

## 2019-02-21 RX ADMIN — ACETAMINOPHEN 975 MG: 325 TABLET, FILM COATED ORAL at 15:21

## 2019-02-21 RX ADMIN — FENTANYL CITRATE 50 MCG: 50 INJECTION, SOLUTION INTRAMUSCULAR; INTRAVENOUS at 10:34

## 2019-02-21 RX ADMIN — SODIUM CHLORIDE, POTASSIUM CHLORIDE, SODIUM LACTATE AND CALCIUM CHLORIDE: 600; 310; 30; 20 INJECTION, SOLUTION INTRAVENOUS at 13:21

## 2019-02-21 RX ADMIN — SUGAMMADEX 200 MG: 100 INJECTION, SOLUTION INTRAVENOUS at 13:26

## 2019-02-21 RX ADMIN — OXYCODONE HYDROCHLORIDE 10 MG: 5 TABLET ORAL at 21:33

## 2019-02-21 RX ADMIN — SODIUM CHLORIDE, POTASSIUM CHLORIDE, SODIUM LACTATE AND CALCIUM CHLORIDE: 600; 310; 30; 20 INJECTION, SOLUTION INTRAVENOUS at 12:54

## 2019-02-21 RX ADMIN — ONDANSETRON 4 MG: 2 INJECTION INTRAMUSCULAR; INTRAVENOUS at 12:23

## 2019-02-21 RX ADMIN — SODIUM CHLORIDE, POTASSIUM CHLORIDE, SODIUM LACTATE AND CALCIUM CHLORIDE: 600; 310; 30; 20 INJECTION, SOLUTION INTRAVENOUS at 10:16

## 2019-02-21 RX ADMIN — ROCURONIUM BROMIDE 50 MG: 10 INJECTION INTRAVENOUS at 10:47

## 2019-02-21 RX ADMIN — ROCURONIUM BROMIDE 20 MG: 10 INJECTION INTRAVENOUS at 11:31

## 2019-02-21 RX ADMIN — LIDOCAINE HYDROCHLORIDE 100 MG: 20 INJECTION, SOLUTION INFILTRATION; PERINEURAL at 10:34

## 2019-02-21 RX ADMIN — CEFAZOLIN 1 G: 1 INJECTION, POWDER, FOR SOLUTION INTRAMUSCULAR; INTRAVENOUS at 12:49

## 2019-02-21 RX ADMIN — OXYCODONE HYDROCHLORIDE 10 MG: 5 TABLET ORAL at 18:02

## 2019-02-21 RX ADMIN — PROPOFOL 70 MG: 10 INJECTION, EMULSION INTRAVENOUS at 13:25

## 2019-02-21 RX ADMIN — FENTANYL CITRATE 50 MCG: 50 INJECTION, SOLUTION INTRAMUSCULAR; INTRAVENOUS at 11:29

## 2019-02-21 RX ADMIN — CEFAZOLIN SODIUM 2 G: 2 INJECTION, SOLUTION INTRAVENOUS at 10:49

## 2019-02-21 RX ADMIN — PROPOFOL 130 MG: 10 INJECTION, EMULSION INTRAVENOUS at 10:34

## 2019-02-21 RX ADMIN — HYDROMORPHONE HYDROCHLORIDE 0.5 MG: 1 INJECTION, SOLUTION INTRAMUSCULAR; INTRAVENOUS; SUBCUTANEOUS at 11:39

## 2019-02-21 RX ADMIN — OXYCODONE HYDROCHLORIDE 5 MG: 5 TABLET ORAL at 15:21

## 2019-02-21 RX ADMIN — ACETAMINOPHEN 975 MG: 325 TABLET, FILM COATED ORAL at 09:27

## 2019-02-21 RX ADMIN — SODIUM CHLORIDE: 9 INJECTION, SOLUTION INTRAVENOUS at 21:54

## 2019-02-21 ASSESSMENT — ACTIVITIES OF DAILY LIVING (ADL)
FALL_HISTORY_WITHIN_LAST_SIX_MONTHS: NO
TOILETING: 0-->INDEPENDENT
TRANSFERRING: 0-->INDEPENDENT
BATHING: 0-->INDEPENDENT
ADLS_ACUITY_SCORE: 11
DRESS: 0-->INDEPENDENT
SWALLOWING: 0-->SWALLOWS FOODS/LIQUIDS WITHOUT DIFFICULTY
ADLS_ACUITY_SCORE: 11
COGNITION: 0 - NO COGNITION ISSUES REPORTED
RETIRED_COMMUNICATION: 0-->UNDERSTANDS/COMMUNICATES WITHOUT DIFFICULTY
RETIRED_EATING: 0-->INDEPENDENT
AMBULATION: 0-->INDEPENDENT

## 2019-02-21 ASSESSMENT — LIFESTYLE VARIABLES: TOBACCO_USE: 1

## 2019-02-21 ASSESSMENT — MIFFLIN-ST. JEOR: SCORE: 1408.63

## 2019-02-21 NOTE — ANESTHESIA PREPROCEDURE EVALUATION
Anesthesia Pre-Procedure Evaluation    Patient: Sweetie Carrero   MRN:     [id number] Gender:   male   Age:    68 year old :      1950        Preoperative Diagnosis: Extramammary Paget's Disease Of Scrotum   Procedure(s):  Removal Of Scrotal Skin  Split Thickness Skin Graft From Right And/Or Left Thigh     Past Medical History:   Diagnosis Date     Diabetes (H)     controlled by diet      Hypertension      Paget's disease of skin of scrotum (H)       Past Surgical History:   Procedure Laterality Date     EXPLORE SCROTUM N/A 2018    Procedure: Wide Local Excision of Scrotal Lesions;  Surgeon: Fracisco Gtz MD;  Location: [facility] OR          Anesthesia Evaluation     . Pt has had prior anesthetic. Type: General and MAC    No history of anesthetic complications          ROS/MED HX    ENT/Pulmonary:  - neg pulmonary ROS   (+)tobacco use, Past use , . .    Neurologic:  - neg neurologic ROS     Cardiovascular:     (+) hypertension----. : . . . :. .       METS/Exercise Tolerance:  4 - Raking leaves, gardening   Hematologic:         Musculoskeletal:  - neg musculoskeletal ROS       GI/Hepatic:  - neg GI/hepatic ROS       Renal/Genitourinary:     (+) chronic renal disease,       Endo:     (+) type I DM, .      Psychiatric:  - neg psychiatric ROS       Infectious Disease:         Malignancy:   (+) Malignancy History of Other  Other CA scrotum status post         Other:    - neg other ROS                     PHYSICAL EXAM:   Mental Status/Neuro:    Airway: Facies: Feasible  Mallampati: Not Assessed  Mouth/Opening: Not Assessed  TM distance: Not Assessed  Neck ROM: Full   Respiratory: Auscultation: CTAB     Resp. Rate: Normal     Resp. Effort: Normal      CV: Rhythm: Regular  Rate: Age appropriate  Heart: Normal Sounds   Comments:      Dental: Normal                  No results found for: WBC, HGB, HCT, PLT, CRP, SED, NA, POTASSIUM, CHLORIDE, CO2, BUN, CR, GLC, NICK, PHOS, MAG, ALBUMIN, PROTTOTAL, ALT, AST, GGT,  "ALKPHOS, BILITOTAL, BILIDIRECT, LIPASE, AMYLASE, JAIMIE, PTT, INR, FIBR, TSH, T4, T3, HCG, HCGS, CKTOTAL, CKMB, TROPN    Preop Vitals  BP Readings from Last 3 Encounters:   02/21/19 163/90   02/18/19 141/84   01/28/19 138/78    Pulse Readings from Last 3 Encounters:   02/21/19 72   02/18/19 81   01/28/19 74      Resp Readings from Last 3 Encounters:   02/21/19 18   12/05/18 16    SpO2 Readings from Last 3 Encounters:   02/21/19 99%   12/05/18 99%      Temp Readings from Last 1 Encounters:   02/21/19 36.8  C (98.2  F) (Oral)    Ht Readings from Last 1 Encounters:   02/21/19 1.702 m (5' 7\")      Wt Readings from Last 1 Encounters:   02/21/19 69 kg (152 lb 1.9 oz)    Estimated body mass index is 23.82 kg/m  as calculated from the following:    Height as of this encounter: 1.702 m (5' 7\").    Weight as of this encounter: 69 kg (152 lb 1.9 oz).     LDA:            Assessment:   ASA SCORE: 3       Documentation: H&P complete; Preop Testing complete; Consents complete   Proceeding: Proceed without further delay  Tobacco Use:  Active user of Tobacco     Plan:   Anes. Type:  General   Pre-Induction: Midazolam IV; Acetaminophen PO   Induction:  IV (Standard)   Airway: Oral ETT   Access/Monitoring: PIV   Maintenance: Balanced   Emergence: Procedure Site   Logistics: Same Day Surgery     Postop Pain/Sedation Strategy:  Standard-Options: Opioids PRN     PONV Management:  Adult Risk Factors:, Postop Opioids     CONSENT: Direct conversation   Plan and risks discussed with: Patient   Blood Products: Consented (ALL Blood Products)                         Ashly Cavazos MD  "

## 2019-02-21 NOTE — OR NURSING
Spoke to Dr. Cavazos on phone, okayed patient for signout and transfer to floor. Will write sign out later.

## 2019-02-21 NOTE — ANESTHESIA CARE TRANSFER NOTE
Patient: Sweetie Carrero    Procedure(s):  Removal Of Scrotal Skin  Split Thickness Skin Graft From Left Thigh    Diagnosis: Extramammary Paget's Disease Of Scrotum  Diagnosis Additional Information: No value filed.    Anesthesia Type:   No value filed.     Note:  Airway :Face Mask  Patient transferred to:PACU  Handoff Report: Identifed the Patient, Identified the Reponsible Provider, Reviewed the pertinent medical history, Discussed the surgical course, Reviewed Intra-OP anesthesia mangement and issues during anesthesia, Set expectations for post-procedure period and Allowed opportunity for questions and acknowledgement of understanding      Vitals: (Last set prior to Anesthesia Care Transfer)    CRNA VITALS  2/21/2019 1317 - 2/21/2019 1350      2/21/2019             Resp Rate (set):  10                Electronically Signed By: KERRY Emmanuel CRNA  February 21, 2019  1:50 PM

## 2019-02-21 NOTE — OP NOTE
Preoperative Diagnosis:   Extramammary paget's disease of the scrotum  Postoperative Diagnosis: same  Procedure performed:  1. Total scrotectomy   2. [place] of split thickness skin graft from the left thigh (0.14 inches thick and 14 x 7 cm in size)  3. Preparation of wound bed for grafting   4. Complex Scrotal reconstruction by split thickness skin grafting from the left anterior thigh.  5. Bilateral orchiopexy  6. Plastic operation of the penis  Surgeon: Dr. Fracisco Gtz   Assistant: Dr. Reuben Villeda, Dr. Patsy Monahan  Anesthesia: General   Specimens Removed: Scrotal skin   Tubes: none  Drains: fuentes catheter  EBL: 25ml   Complications: none  Postop condition: Good   Disposition: Inpatient due to extensive resection, post op pain control, ambulating with perineal incision  Indication for procedure: Sweetie Carrero is a 68 year old male with extramammary paget's disease of the scrotum. Risks, benefits and alternatives to skin excision with grafting to scrotum were explained and informed consent was obtained.   Details of procedure:   After informed consent was obtained and preoperative antibiotics were given, the patient was taken to the operating room, placed supine on the operating table. General anesthesia was induced and he was intubated. He was placed in lithotomy. The lower abdomen, thighs, penis and scrotum were prepped and draped in the usual sterile fashion. A fuentes catheter was placed.   A circumferential incision was made at the base of the penis, sparing the lymphatics superiorly. On the left side we did have to excise some of the lateral penile skin at the penoscrotal junction (2x3cm) due to involvement by Paget's disease. We then performed a total scrotectomy removing all the apocrine glands right up to the edge of the inner thigh skin and wrapping around to the lateral side of the penis bilaterally. This was then taken deeper so that the testicles were exposed inferiorly.   The skin and underlying dartos  "muscle was passed off as a specimen. The testicles and their spermatic cords were pexed together with 3-0 vicryl in several areas and the cords were pexed to the ventral penis. The perineal skin was reconstructed in 3 layers with 2-0 Vicryl, 3-0 Vicryl, and 3-0 nylon interrupted sutures. The lateral edges of the penile skin were reapproximated in two layers with 3-0 vicryl and 4-0 vicryl. The skin on the ventrum of the penis was secured to the penis with 4-0 vicryl to bring it down to the base. The testicles were prepared as the recipient site for the graft by ensuring that we were in the same layer of tissue circumferentially, by controlling bleeding points with bipolar cautery and by spraying the tissue with fibrin glue.    Next we turned our attention to harvesting the split thickness skin graft from the left anterolateral thigh. Using the dermatome at 0.14\" depth by 7cm wide, a 14cm long STSG was harvested. The graft was then meshed in 3-1 fashion to expand its size. The scrotum and thigh were then sprayed with Eviseal fibrin glue and the STSG was then placed around the scrotum with tacking 4-0 vicryl sutures placed on the midline aspect where the testicles were pexed together, proximally at the perineum and distally to the remaining penile skin.   The scrotum was then covered with xeroform dressings, then with mineral oil soaked gauze, then dry Kerlex wrapped, finally wrapped with Coban dressing. The Coban was stitched superiorly and inferiorly.  The skin graft harvest site over the left thigh was first covered with an alginate dressing, then large tegaderm dressings.   Patient tolerated the procedure well. No apparent complications. He was transported to the postanesthesia care unit in stable condition.   Plan: Dressings will be taken down on POD#5    As the attending surgeon I, Fracisco Gtz, was present and scrubbed throughout the procedure.    "

## 2019-02-22 ENCOUNTER — PRE VISIT (OUTPATIENT)
Dept: UROLOGY | Facility: CLINIC | Age: 69
End: 2019-02-22

## 2019-02-22 PROBLEM — C44.99: Status: ACTIVE | Noted: 2019-02-22

## 2019-02-22 LAB
ANION GAP SERPL CALCULATED.3IONS-SCNC: 9 MMOL/L (ref 3–14)
BASOPHILS # BLD AUTO: 0 10E9/L (ref 0–0.2)
BASOPHILS NFR BLD AUTO: 0.3 %
BUN SERPL-MCNC: 7 MG/DL (ref 7–30)
CALCIUM SERPL-MCNC: 8.2 MG/DL (ref 8.5–10.1)
CHLORIDE SERPL-SCNC: 108 MMOL/L (ref 94–109)
CO2 SERPL-SCNC: 26 MMOL/L (ref 20–32)
CREAT SERPL-MCNC: 0.69 MG/DL (ref 0.66–1.25)
DIFFERENTIAL METHOD BLD: NORMAL
EOSINOPHIL # BLD AUTO: 0.1 10E9/L (ref 0–0.7)
EOSINOPHIL NFR BLD AUTO: 1.1 %
ERYTHROCYTE [DISTWIDTH] IN BLOOD BY AUTOMATED COUNT: 12.7 % (ref 10–15)
GFR SERPL CREATININE-BSD FRML MDRD: >90 ML/MIN/{1.73_M2}
GLUCOSE SERPL-MCNC: 100 MG/DL (ref 70–99)
HCT VFR BLD AUTO: 44.4 % (ref 40–53)
HGB BLD-MCNC: 14 G/DL (ref 13.3–17.7)
IMM GRANULOCYTES # BLD: 0 10E9/L (ref 0–0.4)
IMM GRANULOCYTES NFR BLD: 0.3 %
LYMPHOCYTES # BLD AUTO: 0.8 10E9/L (ref 0.8–5.3)
LYMPHOCYTES NFR BLD AUTO: 11.5 %
MCH RBC QN AUTO: 28.2 PG (ref 26.5–33)
MCHC RBC AUTO-ENTMCNC: 31.5 G/DL (ref 31.5–36.5)
MCV RBC AUTO: 90 FL (ref 78–100)
MONOCYTES # BLD AUTO: 0.6 10E9/L (ref 0–1.3)
MONOCYTES NFR BLD AUTO: 7.7 %
NEUTROPHILS # BLD AUTO: 5.7 10E9/L (ref 1.6–8.3)
NEUTROPHILS NFR BLD AUTO: 79.1 %
NRBC # BLD AUTO: 0 10*3/UL
NRBC BLD AUTO-RTO: 0 /100
PLATELET # BLD AUTO: 165 10E9/L (ref 150–450)
POTASSIUM SERPL-SCNC: 3.7 MMOL/L (ref 3.4–5.3)
RBC # BLD AUTO: 4.96 10E12/L (ref 4.4–5.9)
SODIUM SERPL-SCNC: 143 MMOL/L (ref 133–144)
WBC # BLD AUTO: 7.2 10E9/L (ref 4–11)

## 2019-02-22 PROCEDURE — 12000001 ZZH R&B MED SURG/OB UMMC

## 2019-02-22 PROCEDURE — 80048 BASIC METABOLIC PNL TOTAL CA: CPT | Performed by: STUDENT IN AN ORGANIZED HEALTH CARE EDUCATION/TRAINING PROGRAM

## 2019-02-22 PROCEDURE — A9270 NON-COVERED ITEM OR SERVICE: HCPCS | Mod: GY | Performed by: STUDENT IN AN ORGANIZED HEALTH CARE EDUCATION/TRAINING PROGRAM

## 2019-02-22 PROCEDURE — A9270 NON-COVERED ITEM OR SERVICE: HCPCS | Mod: GY | Performed by: PHYSICIAN ASSISTANT

## 2019-02-22 PROCEDURE — 85025 COMPLETE CBC W/AUTO DIFF WBC: CPT | Performed by: STUDENT IN AN ORGANIZED HEALTH CARE EDUCATION/TRAINING PROGRAM

## 2019-02-22 PROCEDURE — 36415 COLL VENOUS BLD VENIPUNCTURE: CPT | Performed by: STUDENT IN AN ORGANIZED HEALTH CARE EDUCATION/TRAINING PROGRAM

## 2019-02-22 PROCEDURE — 25000132 ZZH RX MED GY IP 250 OP 250 PS 637: Mod: GY | Performed by: PHYSICIAN ASSISTANT

## 2019-02-22 PROCEDURE — 25000132 ZZH RX MED GY IP 250 OP 250 PS 637: Mod: GY | Performed by: STUDENT IN AN ORGANIZED HEALTH CARE EDUCATION/TRAINING PROGRAM

## 2019-02-22 RX ORDER — OXYCODONE HYDROCHLORIDE 10 MG/1
5-10 TABLET ORAL EVERY 4 HOURS PRN
Qty: 18 TABLET | Refills: 0 | Status: SHIPPED | OUTPATIENT
Start: 2019-02-22 | End: 2019-04-02

## 2019-02-22 RX ORDER — CIPROFLOXACIN 500 MG/1
500 TABLET, FILM COATED ORAL ONCE
Qty: 1 TABLET | Refills: 0 | Status: SHIPPED | OUTPATIENT
Start: 2019-02-22 | End: 2019-04-02

## 2019-02-22 RX ORDER — OSELTAMIVIR PHOSPHATE 75 MG/1
75 CAPSULE ORAL DAILY
Status: DISCONTINUED | OUTPATIENT
Start: 2019-02-22 | End: 2019-02-23 | Stop reason: HOSPADM

## 2019-02-22 RX ORDER — DIPHENHYDRAMINE HCL 25 MG
25-50 CAPSULE ORAL EVERY 6 HOURS PRN
Status: DISCONTINUED | OUTPATIENT
Start: 2019-02-22 | End: 2019-02-23 | Stop reason: HOSPADM

## 2019-02-22 RX ORDER — AMOXICILLIN 250 MG
1 CAPSULE ORAL 2 TIMES DAILY PRN
Qty: 45 TABLET | Refills: 0 | Status: SHIPPED | OUTPATIENT
Start: 2019-02-22 | End: 2019-05-24

## 2019-02-22 RX ORDER — ACETAMINOPHEN 325 MG/1
650 TABLET ORAL EVERY 4 HOURS PRN
Qty: 150 TABLET | Refills: 1 | Status: SHIPPED | OUTPATIENT
Start: 2019-02-24 | End: 2019-04-02

## 2019-02-22 RX ORDER — LISINOPRIL 20 MG/1
40 TABLET ORAL DAILY
Status: DISCONTINUED | OUTPATIENT
Start: 2019-02-22 | End: 2019-02-23 | Stop reason: HOSPADM

## 2019-02-22 RX ORDER — DIPHENHYDRAMINE HCL 25 MG
25-50 CAPSULE ORAL EVERY 6 HOURS PRN
Qty: 20 CAPSULE | Refills: 0 | Status: SHIPPED | OUTPATIENT
Start: 2019-02-22 | End: 2019-04-02

## 2019-02-22 RX ADMIN — OXYCODONE HYDROCHLORIDE 10 MG: 5 TABLET ORAL at 03:31

## 2019-02-22 RX ADMIN — ACETAMINOPHEN 975 MG: 325 TABLET, FILM COATED ORAL at 00:15

## 2019-02-22 RX ADMIN — ACETAMINOPHEN 975 MG: 325 TABLET, FILM COATED ORAL at 07:51

## 2019-02-22 RX ADMIN — PRAVASTATIN SODIUM 20 MG: 10 TABLET ORAL at 20:03

## 2019-02-22 RX ADMIN — OXYCODONE HYDROCHLORIDE 10 MG: 5 TABLET ORAL at 07:51

## 2019-02-22 RX ADMIN — ACETAMINOPHEN 975 MG: 325 TABLET, FILM COATED ORAL at 14:16

## 2019-02-22 RX ADMIN — OXYCODONE HYDROCHLORIDE 10 MG: 5 TABLET ORAL at 00:15

## 2019-02-22 RX ADMIN — DIPHENHYDRAMINE HYDROCHLORIDE 25 MG: 25 CAPSULE ORAL at 10:55

## 2019-02-22 RX ADMIN — OSELTAMIVIR PHOSPHATE 75 MG: 75 CAPSULE ORAL at 13:09

## 2019-02-22 RX ADMIN — LISINOPRIL 40 MG: 20 TABLET ORAL at 11:02

## 2019-02-22 RX ADMIN — OXYCODONE HYDROCHLORIDE 10 MG: 5 TABLET ORAL at 13:22

## 2019-02-22 ASSESSMENT — ACTIVITIES OF DAILY LIVING (ADL)
ADLS_ACUITY_SCORE: 13

## 2019-02-22 ASSESSMENT — MIFFLIN-ST. JEOR: SCORE: 1412.63

## 2019-02-22 NOTE — ANESTHESIA POSTPROCEDURE EVALUATION
Anesthesia POST Procedure Evaluation    Patient: Sweetie Carrero   MRN:     [id number] Gender:   male   Age:    68 year old :      1950        Preoperative Diagnosis: Extramammary Paget's Disease Of Scrotum   Procedure(s):  Removal Of Scrotal Skin  Split Thickness Skin Graft From Left Thigh   Postop Comments: No value filed.       Anesthesia Type:  General    JZG FV AN POST EVALUATION    Last Anesthesia Record Vitals:  CRNA VITALS  2019 1317 - 2019 1417      2019             Resp Rate (set):  10          Last PACU/Preop Vitals:  Vitals:    19 2003 19 0000 19 0746   BP: 141/78 121/71 141/74   Pulse:      Resp: 13 16 14   Temp: 36.8  C (98.2  F) 37.1  C (98.8  F) 36.8  C (98.3  F)   SpO2: 94% 98% 97%         Electronically Signed By: Ashly Cavazos MD, 2019, 3:11 PM

## 2019-02-22 NOTE — PROGRESS NOTES
Urology Daily Progress Note  02/22/2019    24 hour events/Subjective:     - No acute events overnight   - Some pain in leg   - Denies nausea or emesis   - Hasn't been OOB    O:  Temp:  [97.8  F (36.6  C)-98.9  F (37.2  C)] 98.8  F (37.1  C)  Pulse:  [72-84] (P) 80  Heart Rate:  [76-91] 77  Resp:  [13-20] 16  BP: (121-163)/(62-92) 121/71  SpO2:  [94 %-100 %] 98 %  General: Alert, interactive, & in NAD  Resp: Breathing is non-labored on RA  Cardiac: Extremities warm;   Abdomen: Soft, nontender, nondistended.  : Clean dressing over groin and thigh w/o drainage. Eisenberg catheter with elmer urine  Extremities: No LE edema or obvious joint abnormalities  Skin: Warm and dry, no jaundice or rash     Ins & Outs (24 hours/since MN)  PO 1275/1100  Drain  25/    Labs/Imaging  BMP  Recent Labs   Lab 02/21/19  0900   POTASSIUM 4.0   CR 0.77     CBC  Recent Labs   Lab 02/21/19  0900   HGB 15.5     INRNo lab results found in last 7 days.       Assessment/Plan  68 year old male POD #1 s/p scrotectomy and skin graft for extramammary paget's disease . Doing well.    PLAN:  Neuro/Pain: Continue current regimen  CV/PULM: No acute issues. Encourage IS, ambulation  GI/FEN:    - Regular diet   - Electrolyte replacement PRN   -  mIVF  : UOP appropriate, dressings to be removed POD#5  Heme: No active issues  Endocrine:   Activity: Up ad karen  Ppx: SCDs, ambulation, IS  Dispo: 7B, possible discharge to home today    Seen and examined with the chief resident and . Will discuss with Dr. Gtz.    --    Minesh East MD   Urology Resident     Contacting the Urology Team     Please use the following job codes to reach the Urology Team. Note that you must use an in house phone and that job codes cannot receive text pages.     On weekdays, dial [phone] (or star-star-star [phone] on the new [employer] telephones) then [phone] to reach the Adult Urology resident or PA on call    On weekdays, dial [phone] (or star-star-star [phone] on the new [employer] telephones) then [phone]  to reach the Pediatric Urology resident    On weeknights and weekends, dial [phone] (or star-star-star [phone] on the new [employer] telephones) then [phone] to reach the Urology resident on call (for both Adult and Pediatrics)

## 2019-02-22 NOTE — UTILIZATION REVIEW
[facility]  Admission Status; Secondary Review Determination   Admission Date: 2/21/2019    Under the authority of the Utilization Management Committee, the utilization review process indicated a secondary review on the above patient. The review outcome is based on review of the medical records, discussions with staff, and applying clinical experience noted on the date of the review.   (x) Outpatient Status Appropriate - This patient does not meet hospital inpatient criteria. If this patient's primary payer is Medicare and was admitted as an inpatient, Condition Code 44 should be used and patient status changed to outpatient recovery.    RATIONALE FOR DETERMINATION   68 year old year old male had a scrotectomy and skin graft for extramammary paget's disease. Patient was admitted to the hospital after the procedure. There were no complications reported related to the procedure. Patient has Medicare and the procedure is not on the inpatient list. Dr Casiano was paged that the patient should be outpatient status if discharging today. Currently no clear discharge plan is in place.   The severity of illness, intensity of service provided, expected LOS and risk for adverse outcome doesn't meet inpatient hospital admission.     The information on this document is developed by the utilization review team in order for the business office to ensure compliance. This only denotes the appropriateness of proper admission status and does not reflect the quality of care rendered.   The definitions of Inpatient Status and Observation Status used in making the determination above are those provided in the CMS Coverage Manual, Chapter 1 and Chapter 6, section 70.4.     Sincerely,     Michael Alvarez DO MPH   Physician Advisor  Utilization Management   [facility].

## 2019-02-22 NOTE — DISCHARGE SUMMARY
[facility] UroDischarge Summary    Patient: Sweetie Carrero    MRN: [id number]   : 1950         Date of Admission:  2019   Date of Discharge::  2019  Admitting Physician:  Fracisco Gtz MD  Discharge Physician:  Minesh East MD             Admission Diagnoses:   Extramammary Paget's Disease Of Scrotum  Post-op pain  Past Medical History:   Diagnosis Date     Diabetes (H)     controlled by diet      Hypertension      Paget's disease of skin of scrotum (H)              Discharge Diagnosis:   - Extramammary Paget's Disease Of Scrotum  - Post-op pain  - Hives, etiology unknown - possible allergen may have been cefazolin but this was unconfirmed.     Past Medical History:   Diagnosis Date     Diabetes (H)     controlled by diet      Hypertension      Paget's disease of skin of scrotum (H)             Procedures:   Procedure(s): 19-   1. Excision of extramammary paget's disease from scrotal skin  2. [place] of split thickness skin graft from the left thigh (0.14 inches thick and 14 x 7 cm in size)  3. Preparation of wound bed for grafting   4. Scrotal reconstruction by split thickness skin grafting from the right anterior thigh.  6. Simple scrotoplasty.  Dr. Fracisco Gtz (Urology)            Medications Prior to Admission:     Facility-Administered Medications Prior to Admission   Medication Dose Route Frequency Provider Last Rate Last Dose     [DISCONTINUED] lidocaine (PF) (XYLOCAINE) 1 % injection 30 mL  30 mL Other Once Fracisco Gtz MD         Medications Prior to Admission   Medication Sig Dispense Refill Last Dose     aspirin (ASA) 81 MG EC tablet Take 81 mg by mouth   Past Week at Unknown time     lisinopril (PRINIVIL/ZESTRIL) 40 MG tablet Take 40 mg by mouth daily   2019 at Unknown time     mupirocin (BACTROBAN) 2 % ointment Apply topically 3 times daily   Past Week at Unknown time     pravastatin (PRAVACHOL) 20 MG tablet TAKE ONE TABLET BY MOUTH ONCE DAILY    2/20/2019 at Unknown time             Discharge Medications:     Current Discharge Medication List      START taking these medications    Details   acetaminophen (TYLENOL) 325 MG tablet Take 2 tablets (650 mg) by mouth every 4 hours as needed for pain  Qty: 150 tablet, Refills: 1    Associated Diagnoses: Post-op pain      diphenhydrAMINE (BENADRYL) 25 MG capsule Take 1-2 capsules (25-50 mg) by mouth every 6 hours as needed for itching  Qty: 20 capsule, Refills: 0    Associated Diagnoses: Itching      oxyCODONE (ROXICODONE) 10 MG tablet Take 0.5-1 tablets (5-10 mg) by mouth every 4 hours as needed for moderate to severe pain  Qty: 18 tablet, Refills: 0    Associated Diagnoses: Post-op pain         CONTINUE these medications which have NOT CHANGED    Details   aspirin (ASA) 81 MG EC tablet Take 81 mg by mouth      lisinopril (PRINIVIL/ZESTRIL) 40 MG tablet Take 40 mg by mouth daily      mupirocin (BACTROBAN) 2 % ointment Apply topically 3 times daily      pravastatin (PRAVACHOL) 20 MG tablet TAKE ONE TABLET BY MOUTH ONCE DAILY                   Consultations:   Consultation during this admission received:   None          Brief History of Illness:   Reason for admission requiring a surgical or invasive procedure:   Extramammary Paget's Disease Of Scrotum   The patient underwent the following procedure(s):   See above   There were no immediate complications during this procedure.    Please refer to the full operative summary for details.           Hospital Course:   The patient's hospital course was remarkable for hives and pruritus, which he developed on POD#1.  Possible allergens were explored.  The patient had been taking oxycodone, but he had utilized this medication with a previous surgery without difficulty. He had been given cefazolin intraoperatively, but review of the EMR showed he had been given cefazolin at [facility] about 1 year ago, presumably without incident.  The patient's wife also offered that Mr. Carrero had  taken amoxicillin in the past with no adverse reaction.  The case was discussed with the pharmacist who noted no other common allergens in the medication list.  At this point, Mr. Carrero was given benedryl for symptom relief.      Sweetie Carrero recovered as anticipated and experienced no post-operative complications.      On POD #2 he was ambulating without assitance, tolerating the discharge diet, had pain controlled with PO medications to go home with, and was requiring no IV medications or fluids. He was discharged to  with appropriate contact information, follow-up and instructions as seen below in the discharge paperwork.  He will follow up in 5 days for dressing and catheter removal.         Discharge Labs:     No results found for: PSA  Recent Labs   Lab 02/22/19  0643 02/21/19  0900   WBC 7.2  --    HGB 14.0 15.5     --      Recent Labs   Lab 02/22/19  0643 02/21/19  0900     --    POTASSIUM 3.7 4.0   CHLORIDE 108  --    CO2 26  --    BUN 7  --    CR 0.69 0.77   *  --    NICK 8.2*  --      No lab results found in last 7 days.    Invalid input(s): URINEBLOOD  Results for orders placed or performed in visit on 11/26/18   Urine Culture Aerobic Bacterial   Result Value Ref Range    Specimen Description Unspecified Urine     Special Requests Specimen received in preservative     Culture Micro No growth        Results for orders placed or performed during the hospital encounter of 02/21/19   Hemoglobin   Result Value Ref Range    Hemoglobin 15.5 13.3 - 17.7 g/dL   Creatinine   Result Value Ref Range    Creatinine 0.77 0.66 - 1.25 mg/dL    GFR Estimate >90 >60 mL/min/[1.73_m2]    GFR Estimate If Black >90 >60 mL/min/[1.73_m2]   Potassium   Result Value Ref Range    Potassium 4.0 3.4 - 5.3 mmol/L   Glucose by meter   Result Value Ref Range    Glucose 111 (H) 70 - 99 mg/dL   Glucose by meter   Result Value Ref Range    Glucose 92 70 - 99 mg/dL   Basic metabolic panel   Result Value Ref Range    Sodium  143 133 - 144 mmol/L    Potassium 3.7 3.4 - 5.3 mmol/L    Chloride 108 94 - 109 mmol/L    Carbon Dioxide 26 20 - 32 mmol/L    Anion Gap 9 3 - 14 mmol/L    Glucose 100 (H) 70 - 99 mg/dL    Urea Nitrogen 7 7 - 30 mg/dL    Creatinine 0.69 0.66 - 1.25 mg/dL    GFR Estimate >90 >60 mL/min/[1.73_m2]    GFR Estimate If Black >90 >60 mL/min/[1.73_m2]    Calcium 8.2 (L) 8.5 - 10.1 mg/dL   CBC with platelets differential   Result Value Ref Range    WBC 7.2 4.0 - 11.0 10e9/L    RBC Count 4.96 4.4 - 5.9 10e12/L    Hemoglobin 14.0 13.3 - 17.7 g/dL    Hematocrit 44.4 40.0 - 53.0 %    MCV 90 78 - 100 fl    MCH 28.2 26.5 - 33.0 pg    MCHC 31.5 31.5 - 36.5 g/dL    RDW 12.7 10.0 - 15.0 %    Platelet Count 165 150 - 450 10e9/L    Diff Method Automated Method     % Neutrophils 79.1 %    % Lymphocytes 11.5 %    % Monocytes 7.7 %    % Eosinophils 1.1 %    % Basophils 0.3 %    % Immature Granulocytes 0.3 %    Nucleated RBCs 0 0 /100    Absolute Neutrophil 5.7 1.6 - 8.3 10e9/L    Absolute Lymphocytes 0.8 0.8 - 5.3 10e9/L    Absolute Monocytes 0.6 0.0 - 1.3 10e9/L    Absolute Eosinophils 0.1 0.0 - 0.7 10e9/L    Absolute Basophils 0.0 0.0 - 0.2 10e9/L    Abs Immature Granulocytes 0.0 0 - 0.4 10e9/L    Absolute Nucleated RBC 0.0    EKG 12-lead, tracing only   Result Value Ref Range    Interpretation ECG Click View Image link to view waveform and result             Discharge Instructions and Follow-Up:   Diet:  - Regular/ home diet    Activity:   - May sponge bathe but KEEP YOUR INCISIONAL DRESSINGS DRY.   - No strenuous exercise for 4 weeks.   - No lifting, pushing, pulling more than 10 pounds for 4 weeks. Take care when pushing with your arms to stand up.  - Do not strain your belly area.  When you bend, sit up or twice, you could strain the area around your incision.    - Do not strain with bowel movements.    - Do not drive until you can press the brake pedal quickly and fully without pain.   - Do not operate a motor vehicle while taking  narcotic pain medications.     Medications:   1) PAIN: Oxycodone is a narcotic medication that has been prescribed for pain.  Narcotics will cause sleepiness and constipation and can become addictive, therefore it is best to stop or reduce them as soon as you can.  Any left over narcotics should be disposed of with an Authorized  for unneeded medications.  Contact your [facility]'s or [facility]'s household trash and recycling service to learn about medication disposal options and guidelines for your area.  If you decide to store this medication at home it should be kept in a locked cabinet to prevent access to children or visitors. To reduce your narcotic use, take Tylenol (acetaminophen 625mg) every 4-6 hours.  This has been prescribed for you.  Do not take more than 4,000mg of Tylenol (acetaminophen/ APAP) from all sources in any 24 hour period since this can cause liver damage.  Do not take more than 2400mg of ibuprofen in any 24 hour period since this can cause kidney damage. Never drive, operate machinery or drink alcoholic beverages while you are taking narcotic pain medications.      2) CONSTIPATION: Pericolace (senna/docusate sodium) can be taken twice daily for prevention of constipation since surgery, pain medications and bladder spasm medications can all make you constipated.  Please reduce or stop pericolace if you develop loose stools. Other over the counter solutions such as prune juice, miralax, fiber products, senna, and dulcolax can also be used. If you are taking the pericolace but still have not had a bowel movement in 3 days, start over-the-counter Milk of Magnesia taken twice daily until you have a nice bowel movement.  Call the office with any concerns.     3) BENADRYL (diphenhydramine) - tablets can be taken every 4-6 hours as needed for itching or hives.      4) ANTIBIOTICS (ciprofloxacin) - a single ciprofloxacin tablet has been prescribed to take JUST PRIOR to your Urology  appointment on 2/26/19.     Incisions:   - Keep incisions dry (no showering) until your dressings have been removed in 5 days.  May sponge bathe to keep skin dry and clean.   - Do not scrub incisions or soak in a bath, pool, hot tub, etc. Until all wounds have fully healed.   - The wound dressing will be removed in 5 days in Dr. Gtz's clinic.     Tubes / drains:  - You are going home with a Eisenberg catheter which will remain in place until your follow-up appointment. This catheter will not generally restrict your activities, but you should be careful if you are driving such that it does not become a distraction.    - Your nurse or  will provide written catheter care instructions for you to take home.  - Protect the catheter and treat it like an extension of your body - keep it secured to your leg and do not let it get caught, snagged, or tugged. The catheter tubing should remain tension-free and without kinks. In order to drain best, the tubing and bag should always be lower than your body.  - You may notice a little blood, small amount of white discharge, or caking at the catheter insertion site. This is normal but it can irritate the skin so it is best to wash this off.   - Apply antibiotic ointment or vaseline twice daily to the tip of the penis/catheter insertion point to keep the area lubricated and more comfortable.      Follow-Up:   - Schedule an appointment to be seen by your primary care provider within 7-10 days of discharge for a postoperative checkup.   - Follow up with Dr. Gtz's team (Diamond Sepulveda PA-C) as scheduled on 2/26/19 for dressing removal and catheter removal.  Take your Ciprofloxacin tablet just prior to this appointment.   - Call or return sooner than your regularly scheduled visit if you develop any of the following:  Fever (greater than 101.3F), uncontrolled pain, uncontrolled nausea or vomiting, concerns about bowel function, as well as increased redness, swelling,  "drainage from your wound or any concerns about urinating or urinary catheter drainage.      Here are some phone numbers where you can reach us:  - Monday through Friday, 8am to 4:30pm - as long as it is not a holiday, IT IS BEST to call the Urology Clinic Triage Line at: [phone] with any non-emergent urology concerns.    - If it is a night, weekend, or holiday call the [facility] number at [phone] and ask the  to page the \"urology resident on call\".  Typically, the on-call provider should return your call within 30 minutes.  Please page the on-call provider again if you haven't been contacted as expected.  Rarely, the on-call provider will be unable to promptly return a call due to a hospital emergency.  If you have paged twice and are still not contacted, ask the hospital  to page the \"urology CHIEF-RESIDENT on call\".  - FOR EMERGENCIES, always call 911 or go to the Emergency Department         Discharge Disposition:   Discharged to home      Minesh East MD  Urology Resident           "

## 2019-02-22 NOTE — ANESTHESIA POSTPROCEDURE EVALUATION
Anesthesia POST Procedure Evaluation    Patient: Sweetie Carrero   MRN:     [id number] Gender:   male   Age:    68 year old :      1950        Preoperative Diagnosis: Extramammary Paget's Disease Of Scrotum   Procedure(s):  Removal Of Scrotal Skin  Split Thickness Skin Graft From Left Thigh   Postop Comments: No value filed.       Anesthesia Type:  General    Reportable Event: NO     PAIN: Uncomplicated   Sign Out status: Comfortable, Well controlled pain     PONV: No PONV   Sign Out status:  No Nausea or Vomiting     Neuro/Psych: Uneventful perioperative course   Sign Out Status: Preoperative baseline; Age appropriate mentation     Airway/Resp.: Uneventful perioperative course   Sign Out Status: Non labored breathing, age appropriate RR; Resp. Status within EXPECTED Parameters     CV: Uneventful perioperative course   Sign Out status: Appropriate BP and perfusion indices; Appropriate HR/Rhythm     Disposition:   Sign Out in:  PACU  Disposition:  Floor  Recovery Course: Uneventful  Follow-Up: Not required           Last Anesthesia Record Vitals:  CRNA VITALS  2019 1317 - 2019 1417      2019             Resp Rate (set):  10          Last PACU/Preop Vitals:  Vitals:    19 2003 19 0000 19 0746   BP: 141/78 121/71 141/74   Pulse:      Resp: 13 16 14   Temp: 36.8  C (98.2  F) 37.1  C (98.8  F) 36.8  C (98.3  F)   SpO2: 94% 98% 97%         Electronically Signed By: Ashly Cavazos MD, 2019, 3:12 PM

## 2019-02-23 VITALS
RESPIRATION RATE: 16 BRPM | BODY MASS INDEX: 24.01 KG/M2 | WEIGHT: 153 LBS | HEART RATE: 84 BPM | TEMPERATURE: 96.3 F | OXYGEN SATURATION: 95 % | SYSTOLIC BLOOD PRESSURE: 124 MMHG | DIASTOLIC BLOOD PRESSURE: 63 MMHG | HEIGHT: 67 IN

## 2019-02-23 LAB — INTERPRETATION ECG - MUSE: NORMAL

## 2019-02-23 PROCEDURE — A9270 NON-COVERED ITEM OR SERVICE: HCPCS | Mod: GY | Performed by: STUDENT IN AN ORGANIZED HEALTH CARE EDUCATION/TRAINING PROGRAM

## 2019-02-23 PROCEDURE — 93005 ELECTROCARDIOGRAM TRACING: CPT

## 2019-02-23 PROCEDURE — A9270 NON-COVERED ITEM OR SERVICE: HCPCS | Mod: GY | Performed by: PHYSICIAN ASSISTANT

## 2019-02-23 PROCEDURE — 25000128 H RX IP 250 OP 636

## 2019-02-23 PROCEDURE — 93010 ELECTROCARDIOGRAM REPORT: CPT | Performed by: INTERNAL MEDICINE

## 2019-02-23 PROCEDURE — 25000132 ZZH RX MED GY IP 250 OP 250 PS 637: Mod: GY | Performed by: PHYSICIAN ASSISTANT

## 2019-02-23 PROCEDURE — 25000132 ZZH RX MED GY IP 250 OP 250 PS 637: Mod: GY | Performed by: STUDENT IN AN ORGANIZED HEALTH CARE EDUCATION/TRAINING PROGRAM

## 2019-02-23 RX ORDER — OSELTAMIVIR PHOSPHATE 75 MG/1
75 CAPSULE ORAL DAILY
Qty: 6 CAPSULE | Refills: 0 | Status: SHIPPED | OUTPATIENT
Start: 2019-02-24 | End: 2019-04-02

## 2019-02-23 RX ADMIN — SODIUM CHLORIDE 1000 ML: 9 INJECTION, SOLUTION INTRAVENOUS at 10:52

## 2019-02-23 RX ADMIN — OSELTAMIVIR PHOSPHATE 75 MG: 75 CAPSULE ORAL at 08:48

## 2019-02-23 RX ADMIN — OXYCODONE HYDROCHLORIDE 5 MG: 5 TABLET ORAL at 16:05

## 2019-02-23 RX ADMIN — ACETAMINOPHEN 975 MG: 325 TABLET, FILM COATED ORAL at 00:15

## 2019-02-23 RX ADMIN — ACETAMINOPHEN 975 MG: 325 TABLET, FILM COATED ORAL at 08:48

## 2019-02-23 RX ADMIN — LISINOPRIL 40 MG: 20 TABLET ORAL at 08:48

## 2019-02-23 RX ADMIN — OXYCODONE HYDROCHLORIDE 5 MG: 5 TABLET ORAL at 13:20

## 2019-02-23 RX ADMIN — OXYCODONE HYDROCHLORIDE 10 MG: 5 TABLET ORAL at 10:20

## 2019-02-23 RX ADMIN — ACETAMINOPHEN 975 MG: 325 TABLET, FILM COATED ORAL at 16:05

## 2019-02-23 ASSESSMENT — ACTIVITIES OF DAILY LIVING (ADL)
ADLS_ACUITY_SCORE: 13

## 2019-02-23 NOTE — PROGRESS NOTES
Urology Daily Progress Note  02/23/2019    24 hour events/Subjective:     - Had some drainage from incision yesterday which was reinforced   - Pain controlled   - Denies nausea or emesis   - Passing flatus    O:  Temp:  [97.8  F (36.6  C)-99.9  F (37.7  C)] 99.9  F (37.7  C)  Heart Rate:  [69-86] 86  Resp:  [14-16] 16  BP: (110-153)/(63-76) 153/76  SpO2:  [94 %-97 %] 97 %  General: Alert, interactive, & in NAD  Resp: Breathing is non-labored on RA  Cardiac: Extremities warm;   Abdomen: Soft, nontender, nondistended.  : Clean dressing over groin and thigh w/o drainage. Eisenberg catheter with elemr urine  Extremities: No LE edema or obvious joint abnormalities  Skin: Warm and dry, no jaundice or rash     Ins & Outs (24 hours/since MN)  PO 3125/850    Labs/Imaging  BMP  Recent Labs   Lab 02/22/19  0643 02/21/19  0900     --    POTASSIUM 3.7 4.0   CHLORIDE 108  --    NICK 8.2*  --    CO2 26  --    BUN 7  --    CR 0.69 0.77   *  --      CBC  Recent Labs   Lab 02/22/19  0643 02/21/19  0900   WBC 7.2  --    HGB 14.0 15.5   HCT 44.4  --    MCV 90  --      --      INRNo lab results found in last 7 days.       Assessment/Plan  68 year old male POD #2 s/p scrotectomy and skin graft for extramammary paget's disease . Doing well.    PLAN:  Neuro/Pain: PRN tylenol, oxycodone, and dilaudid for pain control  CV/PULM: No acute issues. Encourage IS, ambulation  GI/FEN: Reg diet, TKO mIVF, bowel reg  : UOP appropriate, dressings to be removed in clinic on 2/26  Heme: No active issues  Endocrine: No active issues  Activity: Up ad karen  Ppx: SCDs, ambulation, IS  Dispo: 7B, discharge to home today    Seen and examined with the chief resident and Dr. Martinez. Will discuss with Dr. Gtz.    --    Minesh East MD  Urology Resident       Contacting the Urology Team     Please use the following job codes to reach the Urology Team. Note that you must use an in house phone and that job codes cannot receive text pages.      On weekdays, dial [phone] (or star-star-star 777 on the new [place] telephones) then [phone] to reach the Adult Urology resident or PA on call    On weekdays, dial [phone] (or star-star-star 777 on the new [place] telephones) then [phone] to reach the Pediatric Urology resident    On weeknights and weekends, dial [phone] (or star-star-star 777 on the new [place] telephones) then [phone] to reach the Urology resident on call (for both Adult and Pediatrics)        Attestation:  This patient was seen and evaluated by me, with the house staff team or resident(s).  I have reviewed the note above and agree.      Zev Martinez MD  Department of Urology  [facility]

## 2019-02-23 NOTE — PROVIDER NOTIFICATION
02/23/19 1100   Call Information   Date of Call 02/23/19   Time of Call 1040   Name of person requesting the team Alexis   Title of person requesting team RN   RRT Arrival time 1045   Reason for call   Type of RRT Adult   Primary reason for call Sudden or unanticipated change in patient condition   Was patient transferred from the ED, ICU, or PACU within last 24 hours prior to RRT call? No   SBAR   Situation BP 50-60/20-30   Background Paget dz,  Htn, malignancy removed from scrotum   Notable History/Conditions Cancer   Interventions ECG;Fluid bolus   Patient Outcome   Patient Outcome Stabilized on unit   RRT Team   Attending/Primary/Covering Physician Dr Casiano   Date Attending Physician notified 02/23/19   Time Attending Physician notified 1107   Physician(s) Dr Magallon   Lead RN Noel Rahman   Post RRT Intervention Assessment   Post RRT Assessment Stable/Improved   Date Follow Up Done 02/23/19   Time Follow Up Done 6938

## 2019-02-24 ENCOUNTER — PATIENT OUTREACH (OUTPATIENT)
Dept: CARE COORDINATION | Facility: CLINIC | Age: 69
End: 2019-02-24

## 2019-02-25 ENCOUNTER — CARE COORDINATION (OUTPATIENT)
Dept: UROLOGY | Facility: CLINIC | Age: 69
End: 2019-02-25

## 2019-02-25 LAB — INTERPRETATION ECG - MUSE: NORMAL

## 2019-02-26 ENCOUNTER — OFFICE VISIT (OUTPATIENT)
Dept: UROLOGY | Facility: CLINIC | Age: 69
End: 2019-02-26
Payer: MEDICARE

## 2019-02-26 VITALS
SYSTOLIC BLOOD PRESSURE: 146 MMHG | DIASTOLIC BLOOD PRESSURE: 75 MMHG | WEIGHT: 154 LBS | BODY MASS INDEX: 24.75 KG/M2 | HEIGHT: 66 IN | HEART RATE: 79 BPM

## 2019-02-26 DIAGNOSIS — C63.2 EXTRAMAMMARY PAGET'S DISEASE OF SCROTUM (H): ICD-10-CM

## 2019-02-26 DIAGNOSIS — Z94.5 H/O SKIN GRAFT: Primary | ICD-10-CM

## 2019-02-26 LAB — COPATH REPORT: NORMAL

## 2019-02-26 ASSESSMENT — MIFFLIN-ST. JEOR: SCORE: 1401.29

## 2019-02-26 ASSESSMENT — PAIN SCALES - GENERAL: PAINLEVEL: MODERATE PAIN (5)

## 2019-02-26 NOTE — LETTER
"  RE: Sweetie Carrero  [address]  [place] [address]     Dear Colleague,    Thank you for referring your patient, Sweetie Carrero, to the [facility] UROLOGY AND INST FOR PROSTATE AND UROLOGIC CANCERS at [facility]. Please see a copy of my visit note below.    Urology Post-op Visit    DATE: 2/21/19  PROCEDURE:   1. Total scrotectomy   2. [place] of split thickness skin graft from the left thigh (0.14 inches thick and 14 x 7 cm in size)  3. Preparation of wound bed for grafting   4. Complex Scrotal reconstruction by split thickness skin grafting from the left anterior thigh.  5. Bilateral orchiopexy  6. Plastic operation of the penis    SURGEON: Dr. Fracisco Gtz    HPI: Sweetie Carrero is a 68 year old male who is 5 days s/p the above noted reconstructive procedure for a history of extramammary Paget's disease of the scrotum. His post-op course was remarkable for pruritis and hives of unclear etiology - possible allergy to cefazolin?, treated with Benadryl and has since resolved. He was discharged to home on POD#2 with scrotal and thigh dressings and Eisenberg catheter in place. He returns to clinic today for catheter removal and dressing take-down.     Pain is improving, worse over left thigh graft site.  Appetite is normal. Eating and drinking normally.  Bowel movements: no BM for the past 6 days despite Colace BID. He feels constipated.  Urination is per Eisenberg catheter. Minimal to no hematuria.    PEx  /75   Pulse 79   Ht 1.676 m (5' 6\")   Wt 69.9 kg (154 lb)   BMI 24.86 kg/m     GEN: well-appearing male in NAD  RESP: no increased respiratory effort  ABD: soft, NT, ND  : Bolster dressing removed uneventfully from the scrotum revealing a light pink mesh graft with excellent take and no blisters. Few tiny areas of pinpoint bleeding on the anterior surface that resolved with applied pressure. No areas of discharge. Minimal to no swelling or ecchymosis. No discoloration. " Appropriately tender to scrotal manipulation. Eisenberg catheter indwelling draining clear yellow urine.  EXT: Dressing on left thigh clean, dry, and intact.    PROCEDURE: A trial of void was performed by nursing staff today in the clinic.  The patient's Eisenberg leg bag was disconnected and 240 cc of sterile water were infused into the patient's bladder via gravity drainage, which the patient tolerated well.  The Eisenberg balloon was decompressed and the catheter was then removed.  After a few minutes Mr. Sweetie Carrero voided 400 cc.  Postvoid residual urine volume by ultrasound was measured as 0 cc.  The patient did pass today's trial of void and the catheter did not need to be replaced.      A/P   Excellent outcome after scrotectomy and scrotal reconstruction with split thickness skin graft for a history of extramammary paget's disease.  -Dressing taken-down uneventfully. Graft appears intact.  -Patient successfully passed a voiding trial and was discharged to home without a Eisenberg catheter (see above for details). Patient took Cipro 500 mg PO x 1 prior to today's appointment for UTI prophylaxis.   -A new dressing was then applied using bacitracin ointment, Xeroform, Kerlix, and Coban.   -Patient and his wife and son were instructed in how to perform dressing changes. They will perform this once daily after showers. If the dressing continually falls off, then ok to just apply bacitracin BID and keep the area as clean and dry as possible.  -Sent with appropriate supplies for scrotal dressing changes x 2 weeks.  -Patient encouraged to use Enemeez later today to assist with constipation. Continue to use Colace BID PRN.  -Follow up with Dr. Gtz or Dr. Villeda in 2 weeks for another wound check and to review pathology results.     Call or RTC sooner with any issues. Warning signs discussed.    Diamond Sepulveda PA-C  Department of Urology

## 2019-02-26 NOTE — PROGRESS NOTES
"Urology Post-op Visit    DATE: 2/21/19  PROCEDURE:   1. Total scrotectomy   2. [place] of split thickness skin graft from the left thigh (0.14 inches thick and 14 x 7 cm in size)  3. Preparation of wound bed for grafting   4. Complex Scrotal reconstruction by split thickness skin grafting from the left anterior thigh.  5. Bilateral orchiopexy  6. Plastic operation of the penis    SURGEON: Dr. Fracisco Gtz    HPI: Sweetie Carrero is a 68 year old male who is 5 days s/p the above noted reconstructive procedure for a history of extramammary Paget's disease of the scrotum. His post-op course was remarkable for pruritis and hives of unclear etiology - possible allergy to cefazolin?, treated with Benadryl and has since resolved. He was discharged to home on POD#2 with scrotal and thigh dressings and Eisenberg catheter in place. He returns to clinic today for catheter removal and dressing take-down.     Pain is improving, worse over left thigh graft site.  Appetite is normal. Eating and drinking normally.  Bowel movements: no BM for the past 6 days despite Colace BID. He feels constipated.  Urination is per Eisenberg catheter. Minimal to no hematuria.    PEx  /75   Pulse 79   Ht 1.676 m (5' 6\")   Wt 69.9 kg (154 lb)   BMI 24.86 kg/m    GEN: well-appearing male in NAD  RESP: no increased respiratory effort  ABD: soft, NT, ND  : Bolster dressing removed uneventfully from the scrotum revealing a light pink mesh graft with excellent take and no blisters. Few tiny areas of pinpoint bleeding on the anterior surface that resolved with applied pressure. No areas of discharge. Minimal to no swelling or ecchymosis. No discoloration. Appropriately tender to scrotal manipulation. Eisenberg catheter indwelling draining clear yellow urine.  EXT: Dressing on left thigh clean, dry, and intact.    PROCEDURE: A trial of void was performed by nursing staff today in the clinic.  The patient's Eisenberg leg bag was disconnected and 240 cc of sterile " water were infused into the patient's bladder via gravity drainage, which the patient tolerated well.  The Eisenberg balloon was decompressed and the catheter was then removed.  After a few minutes Mr. Sweetie Carrero voided 400 cc.  Postvoid residual urine volume by ultrasound was measured as 0 cc.  The patient did pass today's trial of void and the catheter did not need to be replaced.      A/P   Excellent outcome after scrotectomy and scrotal reconstruction with split thickness skin graft for a history of extramammary paget's disease.  -Dressing taken-down uneventfully. Graft appears intact.  -Patient successfully passed a voiding trial and was discharged to home without a Eisenberg catheter (see above for details). Patient took Cipro 500 mg PO x 1 prior to today's appointment for UTI prophylaxis.   -A new dressing was then applied using bacitracin ointment, Xeroform, Kerlix, and Coban.   -Patient and his wife and son were instructed in how to perform dressing changes. They will perform this once daily after showers. If the dressing continually falls off, then ok to just apply bacitracin BID and keep the area as clean and dry as possible.  -Sent with appropriate supplies for scrotal dressing changes x 2 weeks.  -Patient encouraged to use Enemeez later today to assist with constipation. Continue to use Colace BID PRN.  -Follow up with Dr. Gtz or Dr. Villeda in 2 weeks for another wound check and to review pathology results.     Call or RTC sooner with any issues. Warning signs discussed.    Diamond Sepulveda PA-C  Department of Urology

## 2019-02-26 NOTE — PATIENT INSTRUCTIONS
UROLOGY CLINIC VISIT PATIENT INSTRUCTIONS    1) Follow up with Dr. Gtz or Dr. Villeda in 2 weeks for a recheck.    2) Perform dressing changes every day by following these instructions:  -Remove dressing.  -Shower once a day being very careful not to scrub the area of the graft or incisions.  -After your shower, pat the area dry with a towel (do not rub).  -With clean hands, rub some Bacitracin ointment on the scrotum over the skin graft. 2-3 small Bacitracin packets should be enough.  -Next, apply the yellow Xeroform dressing directly over the Bacitracin ointment to cover the scrotum.  -Next, wrap the white Kerlix wrap around the scrotum. Not too snug but not so loose that it easily falls off.  -Finally, wrap the brown Coban wrap around the scrotum. Again, not too snug but not so loose that it easily falls off.  -Repeat this every day until your 2 week follow up with Dr. Gtz's team.    If the dressing falls off repeatedly, it is ok to leave it off. In that case, just apply Bacitracin ointment twice daily and keep the area as clean and dry as possible.     The clear dressing on your left leg will stay in place until if falls off on it's own (several days to weeks). If this falls off before your next appointment, just apply the Bacitracin ointment to the area of the graft using clean hands. You can then lay damp gauze 4x4's over the Bacitracin and wrap these to hold in place with the brown Coban dressing.     If you have any issues, questions or concerns in the meantime, do not hesitate to contact us at [phone] or via [employer].     It was a pleasure meeting with you today.  Thank you for allowing me and my team the privilege of caring for you today.  YOU are the reason we are here, and I truly hope we provided you with the excellent service you deserve.  Please let us know if there is anything else we can do for you so that we can be sure you are leaving completely satisfied with your care  experience.

## 2019-02-26 NOTE — NURSING NOTE
Patient presents to the clinic today for a trial of void, catheter removal.  Patient is Men Carrero    Order by: Diamond Sepulveda PA-C    Cipro 500 mg PO administered prior to procedure. Take at home    Approx 280 mL of sterile water instilled into the bladder via catheter.  16 [place]e indwelling urethral Catheter then removed with out difficulty  10mL water removed from balloon, balloon intact  Patient voided approx 400mL of yellow urine.   Patient tolerated procedure well.          Melanie Fisher MA

## 2019-03-04 ENCOUNTER — PRE VISIT (OUTPATIENT)
Dept: UROLOGY | Facility: CLINIC | Age: 69
End: 2019-03-04

## 2019-03-04 NOTE — TELEPHONE ENCOUNTER
Reason for visit: wound check and review pathology     Relevant information: extramammary paget's disease of the scrotum    Records/imaging/labs/orders: Pathology available    Pt called: no need for a call    At Rooming: regular

## 2019-03-11 ENCOUNTER — OFFICE VISIT (OUTPATIENT)
Dept: UROLOGY | Facility: CLINIC | Age: 69
End: 2019-03-11
Payer: MEDICARE

## 2019-03-11 ENCOUNTER — TELEPHONE (OUTPATIENT)
Dept: SURGERY | Facility: CLINIC | Age: 69
End: 2019-03-11

## 2019-03-11 VITALS
HEIGHT: 66 IN | DIASTOLIC BLOOD PRESSURE: 68 MMHG | HEART RATE: 79 BPM | SYSTOLIC BLOOD PRESSURE: 123 MMHG | BODY MASS INDEX: 24.48 KG/M2 | WEIGHT: 152.3 LBS

## 2019-03-11 DIAGNOSIS — C63.2 EXTRAMAMMARY PAGET'S DISEASE OF SCROTUM (H): Primary | ICD-10-CM

## 2019-03-11 ASSESSMENT — PAIN SCALES - GENERAL: PAINLEVEL: MILD PAIN (3)

## 2019-03-11 ASSESSMENT — MIFFLIN-ST. JEOR: SCORE: 1393.58

## 2019-03-11 NOTE — LETTER
RE: Sweetie Carrero  [address]  [facility] [address]     Dear Colleague,    Thank you for referring your patient, Sweetie Carrero, to the [facility] UROLOGY AND INST FOR PROSTATE AND UROLOGIC CANCERS at [facility]. Please see a copy of my visit note below.    Urology Clinic    Fracisco Gtz MD  Date of Service: 2019     Name: Sweetie Carrero  MRN: [id number]  Age: 68 year old  : 1950  Referring provider: Fracisco Gtz     Assessment and Plan:   68 year old male 2.5 weeks status post  total scrotectomy, complex  scrotal reconstruction by split thickness skin grafting from the left anterior thigh, bilateral orchiopexy with excellent graft take. We removed the permanent stitches for perineal closure and that wound is perfectly well-healed.     He can stop applying dressings and leave the skin open to air. He should continue showering daily. Lotion can be used for dry skin.     Pathology revealed positive posterior margin. We previously closed his perineum primarily and although some of the more anterior area on the back side of the neoscrotum is tight, the most posterior area where we will need to re-resect is loose skin so I think we should be able to primarily close that again, as long as there is no perianal involvement.    He will follow up with colorectal surgery to determine if they should be involved with future surgery. They may want to do some mapping biopsies of the perianal area in clinic to plan/rule out need for resection of any perianal area when I go in to resect the posterior perineal margin. Surgery should be delayed by 3 months to allow for healing.    Will ask nurse to facilitate temporary handicap parking pass for 1 month.     HPI:   Sweetie Carrero is a 68 year old male who is 2.5 weeks s/p total scrotectomy, complex  scrotal reconstruction by split thickness skin grafting from the left anterior thigh, bilateral orchiopexy.    He has been  showering and wrapping the area daily.     Vital signs reviewed    Exam:  Incision clean, dry, intact  No tenderness or evidence of cellulitis  No hematoma  Perineal reconstruction sutures are intact and were removed in clinic.   There is 100 percent healing of the skin graft.   Donor site on left thigh is healing well. I removed the Tegaderm.    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 All documentation has been reviewed by the aforementioned provider prior to being entered into the official medical record.    Again, thank you for allowing me to participate in the care of your patient.      Sincerely,    Fracisco Gtz MD

## 2019-03-11 NOTE — NURSING NOTE
"Chief Complaint   Patient presents with     RECHECK     wound check and review pathology         Blood pressure 123/68, pulse 79, height 1.676 m (5' 6\"), weight 69.1 kg (152 lb 4.8 oz). Body mass index is 24.58 kg/m .    Patient Active Problem List   Diagnosis     Calculus of kidney     Essential hypertension     Malignant neoplasm of scrotum (H)     Extramammary Paget's disease of scrotum (H)     Hematuria     Hyperlipidemia     Microhematuria     Open wound of scalp     Tinea cruris     Type 2 diabetes mellitus without complications (H)     Post-op pain     Paget disease, extra mammary       Allergies   Allergen Reactions     Atorvastatin Muscle Pain (Myalgia)     PN: weakness     Simvastatin Muscle Pain (Myalgia)     PN: weakness       Current Outpatient Medications   Medication Sig Dispense Refill     acetaminophen (TYLENOL) 325 MG tablet Take 2 tablets (650 mg) by mouth every 4 hours as needed for pain 150 tablet 1     aspirin (ASA) 81 MG EC tablet Take 81 mg by mouth       diphenhydrAMINE (BENADRYL) 25 MG capsule Take 1-2 capsules (25-50 mg) by mouth every 6 hours as needed for itching 20 capsule 0     lisinopril (PRINIVIL/ZESTRIL) 40 MG tablet Take 40 mg by mouth daily       mupirocin (BACTROBAN) 2 % ointment Apply topically 3 times daily       pravastatin (PRAVACHOL) 20 MG tablet TAKE ONE TABLET BY MOUTH ONCE DAILY       senna-docusate (SENOKOT-S/PERICOLACE) 8.6-50 MG tablet Take 1 tablet by mouth 2 times daily as needed for constipation 45 tablet 0       Social History     Tobacco Use     Smoking status: Former Smoker     Smokeless tobacco: Never Used   Substance Use Topics     Alcohol use: Yes     Comment: rarely     Drug use: No       ARMANI Gooden  3/11/2019  9:31 AM     "

## 2019-03-11 NOTE — LETTER
March 11, 2019      Sweetie Carrero  [address] [facility] [address]        To Whom it May Concern,    Mr. Sweetie Carrero had surgery on February 21, 2019 with Dr. Fracisco Gtz and is having a difficult time ambulating in the post operative period and will need close parking until April 15, 2019.  If you have any further questions please feel free to reach the clinic at [phone].          Sincerely,        Fracisco Gtz MD

## 2019-03-11 NOTE — TELEPHONE ENCOUNTER
[facility] Call Center    Phone Message    May a detailed message be left on voicemail: yes    Reason for Call: Other: Nader from [facility] called to scheduled pt. for DX: Paget's disease. Pt is being referred by dr. Gtz. Please call pt for scheduling as DX is not in guidelines and pt needs appt within 24-72 hours.      Action Taken: Message routed to:  [facility] ([facility] [place]

## 2019-03-11 NOTE — PROGRESS NOTES
Urology Clinic    Fracisco Gtz MD  Date of Service: 2019     Name: Sweetie Carrero  MRN: [id number]  Age: 68 year old  : 1950  Referring provider: Fracisco Gtz     Assessment and Plan:   68 year old male 2.5 weeks status post  total scrotectomy, complex scrotal reconstruction by split thickness skin grafting from the left anterior thigh, bilateral orchiopexy with excellent graft take. We removed the permanent stitches for perineal closure and that wound is perfectly well-healed.     He can stop applying dressings and leave the skin open to air. He should continue showering daily. Lotion can be used for dry skin.     Pathology revealed positive posterior margin. We previously closed his perineum primarily and although some of the more anterior area on the back side of the neoscrotum is tight, the most posterior area where we will need to re-resect is loose skin so I think we should be able to primarily close that again, as long as there is no perianal involvement.    He will follow up with colorectal surgery to determine if they should be involved with future surgery. They may want to do some mapping biopsies of the perianal area in clinic to plan/rule out need for resection of any perianal area when I go in to resect the posterior perineal margin. Surgery should be delayed by 3 months to allow for healing.    Will ask nurse to facilitate temporary handicap parking pass for 1 month.     HPI:   Sweetie Carrero is a 68 year old male who is 2.5 weeks s/p total scrotectomy, complex scrotal reconstruction by split thickness skin grafting from the left anterior thigh, bilateral orchiopexy.    He has been showering and wrapping the area daily.     Vital signs reviewed    Exam:  Incision clean, dry, intact  No tenderness or evidence of cellulitis  No hematoma  Perineal reconstruction sutures are intact and were removed in clinic.   There is 100 percent healing of the skin graft.   Donor site on left  thigh is healing well. I removed the Tegaderm.    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 All documentation has been reviewed by the aforementioned provider prior to being entered into the official medical record.

## 2019-03-15 NOTE — TELEPHONE ENCOUNTER
Patient was called and is scheduled for an appointment with Dr. Owen on 4/2 at 1:30 pm. Patient stated understanding of appointment date, time, and location. Patient is in agreement with this plan of care. Patient's questions and concerns were addressed to his stated satisfaction. Patient will callback directly with further questions or concerns.

## 2019-03-16 ENCOUNTER — MYC MEDICAL ADVICE (OUTPATIENT)
Dept: UROLOGY | Facility: CLINIC | Age: 69
End: 2019-03-16

## 2019-03-17 ENCOUNTER — HOSPITAL ENCOUNTER (EMERGENCY)
Facility: CLINIC | Age: 69
Discharge: HOME | End: 2019-03-17
Attending: EMERGENCY MEDICINE | Admitting: EMERGENCY MEDICINE
Payer: MEDICARE

## 2019-03-17 ENCOUNTER — TELEPHONE (OUTPATIENT)
Dept: SURGERY | Facility: CLINIC | Age: 69
End: 2019-03-17

## 2019-03-17 VITALS
DIASTOLIC BLOOD PRESSURE: 73 MMHG | SYSTOLIC BLOOD PRESSURE: 146 MMHG | RESPIRATION RATE: 18 BRPM | TEMPERATURE: 97.7 F | OXYGEN SATURATION: 96 % | HEART RATE: 80 BPM

## 2019-03-17 DIAGNOSIS — N48.89 SWELLING OF PENIS: ICD-10-CM

## 2019-03-17 PROCEDURE — 99282 EMERGENCY DEPT VISIT SF MDM: CPT | Performed by: EMERGENCY MEDICINE

## 2019-03-17 PROCEDURE — 99283 EMERGENCY DEPT VISIT LOW MDM: CPT | Mod: Z6 | Performed by: EMERGENCY MEDICINE

## 2019-03-17 ASSESSMENT — ENCOUNTER SYMPTOMS
ABDOMINAL PAIN: 0
DYSURIA: 0
WOUND: 0
NECK STIFFNESS: 0
DIFFICULTY URINATING: 0
CONFUSION: 0
COLOR CHANGE: 0
ADENOPATHY: 0
EYE REDNESS: 0
ARTHRALGIAS: 0
SHORTNESS OF BREATH: 0
FEVER: 0
HEADACHES: 0

## 2019-03-17 NOTE — ED TRIAGE NOTES
Pt presents to ED for a complaint of swelling of his scrotum and penis after a surgery 3 weeks ago.

## 2019-03-17 NOTE — PROGRESS NOTES
"  UROLOGY BRIEF NOTE    HPI: 68 year old male 3 weeks status post  total scrotectomy, complex scrotal reconstruction by split thickness skin grafting from the left anterior thigh, bilateral orchiopexy     S:    Patient reports new diffuse penile edema for the past two days. No new drainage. Non-tender. Some inner thigh crease new non-itchy red dots/mild rash. Did try Bacitracin for this. No fevers/chills or nausea/vomiting otherwise feeling well.    His wife reports they had had supplies ordered to wrap his penis but had not done so    O:      /73   Temp 97.7  F (36.5  C) (Oral)   Resp 16   SpO2 96%     NAD  NLB on RA  Soft, non-tender  Scrotal graft well adhered with good color and appearance without areas of necrosis, ulceration, bullae or drainage and without overlying erythema or tenderness  Penile shaft with diffuse edema, nontender, normal sensation    A/P:    68 year old recovering well from scrotectomy, orchiopexy, STSG with reassuring exam consistent with lymph edema.    - Instructed patient and wife at bedside on how to apply Coban from distal to proximal penis with gentle compression. Instructed to loosen for any glans color or sensation changes and okay to remove as needed for comfort or if impeding urination. Supplied extra Coban for this.    Seen with chief resident. Plan confirmed with Dr. Ulisses Emmanuel MD  Urology Resident    For questions re this patient, please see \"contacting urology team\" instructions below, or refer to the call sheet     Contacting the Urology Team     Please use the following job codes to reach the Urology Team. Note that you must use an in house phone and that job codes cannot receive text pages.     On weekdays, dial [phone] (or star-star-star [phone] on the new [employer]) then [phone] to reach the Adult Urology resident or PA on call    On weekdays, dial [phone] (or star-star-star [phone] on the new [employer] telephones) then [phone] to reach the Pediatric Urology " "resident    On weeknights and weekends, dial [phone] (or star-star-star [phone] on the new [employer] telephones) then [phone] to reach the Urology resident on call (for both Adult and Pediatrics)    Alternatively, you can reach urology pagers directly using the [employer] intranet as follows:    Open Internet Explorer (you must be logged to the [employer] intranet)    On the [employer] home page, hover over the Resources menu (4th menu from the [employer] symbol on the menu bar), which will reveal a submenu, then click \"In House Pagers and On Call Calendars\" (right column, 6th option from the bottom).    Click the drop down menu next to the Date, and scroll down to Urology/[facility], then click it. You should see a list of the residents assigned to each site, with a hyperlink to their pager (click the pager icon)            "

## 2019-03-17 NOTE — ED PROVIDER NOTES
[place] EMERGENCY DEPARTMENT ([facility])  3/17/19   History     Chief Complaint   Patient presents with     Groin Swelling     The history is provided by the patient, the spouse and medical records.     Sweetie Carrero is a 68 year old male with a medical history significant for extramammary Paget's disease of the scrotum who was hospitalized here from 2/21/2019 to 2/23/2019 and a total scrotectomy and reconstruction on 2/21/2019.  The patient's postoperative course during his hospitalization was complicated by pruritus and hives of unclear etiology, it was thought to be possibly due to an allergy to cefazolin.  The patient reports that he was doing well since that time; he has followed up in the Urology clinic twice.  The patient was last seen in clinic on 3/11/2019 and he reports that he was doing well at that time.  However, on 3/15/2019 he began noticing some scrotal swelling and itching.  The patient has been putting topical antibiotic cream on it, but the swelling has continued to worsen.  He denies any pain, fevers, or penile drainage.  The patient contacted the nurse line this morning and he was advised to come to the Emergency Department to be evaluated.    I have reviewed the Medications, Allergies, Past Medical and Surgical History, and Social History in the [employer] system.    Past Medical History:   Diagnosis Date     Diabetes (H)     controlled by diet      Hypertension      Paget's disease of skin of scrotum (H)        Past Surgical History:   Procedure Laterality Date     EXPLORE SCROTUM N/A 12/5/2018    Procedure: Wide Local Excision of Scrotal Lesions;  Surgeon: Fracisco Gtz MD;  [facility]     GRAFT SKIN SPLIT THICKNESS FROM EXTREMITY Left 2/21/2019    Procedure: Split Thickness Skin Graft From Left Thigh;  Surgeon: Fracisco Gtz MD;  [facility] OR     SCROTOPLASTY N/A 2/21/2019    Procedure: Removal Of Scrotal Skin;  Surgeon: Fracisco Gtz MD;  [facility]  OR       No family history on file.    Social History     Tobacco Use     Smoking status: Former Smoker     Smokeless tobacco: Never Used   Substance Use Topics     Alcohol use: Yes     Comment: rarely       No current facility-administered medications for this encounter.      Current Outpatient Medications   Medication     acetaminophen (TYLENOL) 325 MG tablet     aspirin (ASA) 81 MG EC tablet     diphenhydrAMINE (BENADRYL) 25 MG capsule     lisinopril (PRINIVIL/ZESTRIL) 40 MG tablet     mupirocin (BACTROBAN) 2 % ointment     pravastatin (PRAVACHOL) 20 MG tablet     senna-docusate (SENOKOT-S/PERICOLACE) 8.6-50 MG tablet        Allergies   Allergen Reactions     Atorvastatin Muscle Pain (Myalgia)     PN: weakness     Simvastatin Muscle Pain (Myalgia)     PN: weakness         Review of Systems   Constitutional: Negative for fever.   HENT: Negative for congestion.    Eyes: Negative for redness.   Respiratory: Negative for shortness of breath.    Cardiovascular: Negative for chest pain.   Gastrointestinal: Negative for abdominal pain.   Genitourinary: Positive for penile swelling. Negative for decreased urine volume, difficulty urinating, dysuria, penile pain, scrotal swelling, testicular pain and urgency.   Musculoskeletal: Negative for arthralgias and neck stiffness.   Skin: Negative for color change and wound.   Neurological: Negative for headaches.   Hematological: Negative for adenopathy.   Psychiatric/Behavioral: Negative for confusion.   All other systems reviewed and are negative.      Physical Exam   BP: 146/73  Heart Rate: 86  Temp: 97.7  F (36.5  C)  Resp: 16  SpO2: 96 %      Physical Exam   Constitutional: He is oriented to person, place, and time. He appears well-developed and well-nourished. No distress.   HENT:   Head: Normocephalic and atraumatic.   Eyes: EOM are normal. No scleral icterus.   Neck: Normal range of motion.   Cardiovascular: Normal rate and intact distal pulses.   Pulmonary/Chest: Breath  sounds normal. No respiratory distress.   Abdominal: Soft. Bowel sounds are normal. He exhibits no distension and no mass. There is no tenderness. There is no rebound and no guarding. No hernia.   Genitourinary: No penile tenderness.   Genitourinary Comments: Mild to moderate edema on penis.  Foreskin is retractable without difficulties.  No erythema or induration of the penis or scrotum.  No penile or scrotal tenderness.  No penile drainage.   Musculoskeletal: Normal range of motion. He exhibits no edema.   Neurological: He is alert and oriented to person, place, and time. No cranial nerve deficit. He exhibits normal muscle tone. Coordination normal.   Skin: Skin is warm. Capillary refill takes less than 2 seconds. No rash noted. He is not diaphoretic. No erythema.   Psychiatric: He has a normal mood and affect. His behavior is normal. Judgment and thought content normal.   Nursing note and vitals reviewed.      ED Course   10:01 AM  The patient was seen and examined by Dr. Felix in Room 3.        Procedures                                          Critical Care time:  none             Labs Ordered and Resulted from Time of ED Arrival Up to the Time of Departure from the ED - No data to display         Assessments & Plan (with Medical Decision Making)   This is a 68 year old male with recent complex urologic surgery presenting with penile swelling for several days. Differential diagnosis: dependent edema, unlikely post-op infection, unlikely Gilmar's gangrene.    After thorough history and physical examination, patient appears to be in no acute distress. And do not see any signs of obvious infection, however, I will consult urology team for further input.    Patient was seen and evaluated by urology team at this time they do not believe that he needs any further treatment other than coband application to his penis and follow-up in the urology clinic. They did not feel there there is any infection that needs to  be treated any further with antibiotics. Patient family agree with the plan. At this time he will be discharged.     This part of the medical record was transcribed by Penelope Ang, Medical Scribe, from a dictation done by Dr. Felix.     I have reviewed the nursing notes.    I have reviewed the findings, diagnosis, plan and need for follow up with the patient.       Medication List      ASK your doctor about these medications    ciprofloxacin 500 MG tablet  Commonly known as:  CIPRO  500 mg, Oral, ONCE, Taken just prior to Urology appt on 2/26/19  Ask about: Should I take this medication?     oseltamivir 75 MG capsule  Commonly known as:  TAMIFLU  75 mg, Oral, DAILY  Ask about: Should I take this medication?     oxyCODONE IR 10 MG tablet  Commonly known as:  ROXICODONE  5-10 mg, Oral, EVERY 4 HOURS PRN  Ask about: Should I take this medication?            Final diagnoses:   Swelling of penis     I, Avtar Majano, am serving as a trained medical scribe to document services personally performed by Noah Felix MD, based on the provider's statements to me.   INoah MD, was physically present and have reviewed and verified the accuracy of this note documented by Avtar Majano.    3/17/2019   [facility], [place], EMERGENCY DEPARTMENT     Kd Felix MD  03/17/19 2111

## 2019-03-17 NOTE — DISCHARGE INSTRUCTIONS
Please make an appointment to follow up with Urology Clinic (phone: [phone]) as soon as possible for further evaluation and recommendations.  If your symptoms worsen please come back to the emergency department.  At this time there are no obvious signs of infection and he did not need any further surgical procedures.  Swelling will likely resolve spontaneously.

## 2019-03-17 NOTE — TELEPHONE ENCOUNTER
Wife called about new maxx-incisional swelling/redness without drainage or tenderness without fevers/chills.    Already observed 24 hours without improvement now. Advised he be seen.

## 2019-04-01 NOTE — PROGRESS NOTES
"Colon and Rectal Surgery Clinic Note    RE: Sweetie Carrero  : 1950  BELINDA: 2019    Men is a 68 year old male who presents today for evaluation for perianal Paget's disease.  He is seen today with his son Benedicto.    HPI:     Scrotal Paget's with total scrotectomy and reconstruction 19 with positive posterior margin.     Dr. 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  Gen: NAD, sitting comfortably in the chair  CV: RRR  Pulm: Non-labored breathing on RA  Rectal exam: No external hemorrhoids or fissures, tiny bilateral remnant scrotum on perineum.  No skin abnormalities.    Vitals: /76 (BP Location: Left arm, Patient Position: Sitting, Cuff Size: Adult Regular)   Pulse 81   Ht 1.676 m (5' 6\")   Wt 70.5 kg (155 lb 8 oz)   SpO2 98%   BMI 25.10 kg/m    BMI= Body mass index is 25.1 kg/m .      Laboratory data:    Recent Labs   Lab Test 19  0643   WBC 7.2   HGB 14.0      CR 0.69       Assessment/plan:  Mr. Carrero is a pleasant 69 yo M w/ a PMH of HTN, HLD, and diet controlled diabetes who recently underwent total scrotectomy for extramammary Paget's disease of the scrotum with reconsctruction. His pathology was remarkable for positive posterior margins and he was referred to our clinic for evaluation of perianal " involvement.  We discussed the fact that Paget's disease can be patchy in distribution and is often not clinically visible, and for this reason I advised examination under anesthesia with mapping biopsies focused on the perineum but also perianally.  I explained that perianal Paget's disease can be associated with colorectal cancers so advised a colonoscopy to be absolutely certain that there is no underlying malignancy.  Given the extensive scrotal involvement I think this is much less likely than primary scrotal Paget's, that I feel obliged to be certain there are no intraluminal tumors with the positive posterior margin.  Accordingly, he will be scheduled for a grid biopsy and colonoscopy for further evaluation prior to his next surgery with Dr. Casiano.      I answered all of Sweetie and Benedicto's questions to their stated satisfaction.  They expressed their understanding and agreement with the proposed plan.    Total time 25 minutes.  Greater than half the time was spent counseling.        For details of past medical history, surgical history, family history, medications, allergies, and review of systems, please see details below.    Medical history:  Past Medical History:   Diagnosis Date     Diabetes (H)     controlled by diet      Hypertension      Paget's disease of skin of scrotum (H)        Surgical history:  Past Surgical History:   Procedure Laterality Date     EXPLORE SCROTUM N/A 12/5/2018    Procedure: Wide Local Excision of Scrotal Lesions;  Surgeon: Fracisco Gtz MD;  [facility]     GRAFT SKIN SPLIT THICKNESS FROM EXTREMITY Left 2/21/2019    Procedure: Split Thickness Skin Graft From Left Thigh;  Surgeon: Fracisco Gtz MD;  [facility]     SCROTOPLASTY N/A 2/21/2019    Procedure: Removal Of Scrotal Skin;  Surgeon: Fracisco Gtz MD;  [facility] OR       Family history:  No family history on file.    Medications:  Current Outpatient Medications   Medication Sig Dispense  "Refill     aspirin (ASA) 81 MG EC tablet Take 81 mg by mouth       lisinopril (PRINIVIL/ZESTRIL) 40 MG tablet Take 40 mg by mouth daily       mupirocin (BACTROBAN) 2 % ointment Apply topically 3 times daily       pravastatin (PRAVACHOL) 20 MG tablet TAKE ONE TABLET BY MOUTH ONCE DAILY         Allergies:  The patientis allergic to atorvastatin and simvastatin.    Social history:  Social History     Tobacco Use     Smoking status: Former Smoker     Smokeless tobacco: Never Used   Substance Use Topics     Alcohol use: Yes     Comment: rarely     Marital status: .    Review of Systems:  Nursing Notes:   Shayy Valdez, EMT  4/2/2019  1:18 PM  Signed  Chief Complaint   Patient presents with     Consult     Extramammary Paget's disease of perianal skin.       Vitals:    04/02/19 1316   BP: 142/76   BP Location: Left arm   Patient Position: Sitting   Cuff Size: Adult Regular   Pulse: 81   SpO2: 98%   Weight: 155 lb 8 oz   Height: 5' 6\"       Body mass index is 25.1 kg/m .      Shayy Valdez, EMT                             Von Owen MD   Professor and Chief  Division of Colon and Rectal Surgery  [facility]      Referring Provider:  Fracisco Gtz MD  [address]  [place] [address]     Primary Care Provider:  Eb Guthrie    This note was created using speech recognition software and may contain unintended word substitutions.  "

## 2019-04-02 ENCOUNTER — OFFICE VISIT (OUTPATIENT)
Dept: SURGERY | Facility: CLINIC | Age: 69
End: 2019-04-02
Payer: MEDICARE

## 2019-04-02 VITALS
OXYGEN SATURATION: 98 % | DIASTOLIC BLOOD PRESSURE: 76 MMHG | WEIGHT: 155.5 LBS | BODY MASS INDEX: 24.99 KG/M2 | SYSTOLIC BLOOD PRESSURE: 142 MMHG | HEART RATE: 81 BPM | HEIGHT: 66 IN

## 2019-04-02 DIAGNOSIS — C63.2 EXTRAMAMMARY PAGET'S DISEASE OF SCROTUM (H): Primary | ICD-10-CM

## 2019-04-02 ASSESSMENT — MIFFLIN-ST. JEOR: SCORE: 1408.09

## 2019-04-02 NOTE — LETTER
"2019       RE: Sweetie Carrero  [address]  [place] [facility] [id number]     Dear Colleague,    Thank you for referring your patient, Sweetie Carrero, to the [facility] COLON AND RECTAL SURGERY at [facility]. Please see a copy of my visit note below.    Colon and Rectal Surgery Clinic Note    RE: Sweteie Carrero  : 1950  BELINDA: 2019    Men is a 68 year old male who presents today for evaluation for perianal Paget's disease.  He is seen today with his son Benedicto.    HPI:     Scrotal Paget's with total scrotectomy and reconstruction 19 with positive posterior margin.     Dr. 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  Gen: NAD, sitting comfortably in the chair  CV: RRR  Pulm: Non-labored breathing on RA  Rectal exam: No external hemorrhoids or fissures, tiny bilateral remnant scrotum on perineum.  No skin abnormalities.    Vitals: /76 (BP Location: Left arm, Patient Position: Sitting, Cuff Size: Adult Regular)   Pulse 81   Ht 1.676 m (5' 6\")   Wt 70.5 kg (155 lb 8 oz)   SpO2 98%   BMI 25.10 kg/m     BMI= Body mass index is 25.1 kg/m .    Laboratory data:    Recent Labs   Lab Test 19  0643   WBC 7.2   HGB 14.0      CR 0.69       Assessment/plan:  Mr. Carrero is a pleasant 67 yo M w/ a " PMH of HTN, HLD, and diet controlled diabetes who recently underwent total scrotectomy for extramammary Paget's disease of the scrotum with reconsctruction. His pathology was remarkable for positive posterior margins and he was referred to our clinic for evaluation of perianal involvement.  We discussed the fact that Paget's disease can be patchy in distribution and is often not clinically visible, and for this reason I advised examination under anesthesia with mapping biopsies focused on the perineum but also perianally.  I explained that perianal Paget's disease can be associated with colorectal cancers so advised a colonoscopy to be absolutely certain that there is no underlying malignancy.  Given the extensive scrotal involvement I think this is much less likely than primary scrotal Paget's, that I feel obliged to be certain there are no intraluminal tumors with the positive posterior margin.  Accordingly, he will be scheduled for a grid biopsy and colonoscopy for further evaluation prior to his next surgery with Dr. Casiano.      I answered all of Men and Benedicto's questions to their stated satisfaction.  They expressed their understanding and agreement with the proposed plan.    Total time 25 minutes.  Greater than half the time was spent counseling.        For details of past medical history, surgical history, family history, medications, allergies, and review of systems, please see details below.    Medical history:  Past Medical History:   Diagnosis Date     Diabetes (H)     controlled by diet      Hypertension      Paget's disease of skin of scrotum (H)        Surgical history:  Past Surgical History:   Procedure Laterality Date     EXPLORE SCROTUM N/A 12/5/2018    Procedure: Wide Local Excision of Scrotal Lesions;  Surgeon: Fracisco Gtz MD;  [facility]     GRAFT SKIN SPLIT THICKNESS FROM EXTREMITY Left 2/21/2019    Procedure: Split Thickness Skin Graft From Left Thigh;  Surgeon: Fracisco Gtz  "MD Cristian;  [facility] OR     SCROTOPLASTY N/A 2/21/2019    Procedure: Removal Of Scrotal Skin;  Surgeon: Fracisco Gtz MD;  Location:  OR       Family history:  No family history on file.    Medications:  Current Outpatient Medications   Medication Sig Dispense Refill     aspirin (ASA) 81 MG EC tablet Take 81 mg by mouth       lisinopril (PRINIVIL/ZESTRIL) 40 MG tablet Take 40 mg by mouth daily       mupirocin (BACTROBAN) 2 % ointment Apply topically 3 times daily       pravastatin (PRAVACHOL) 20 MG tablet TAKE ONE TABLET BY MOUTH ONCE DAILY         Allergies:  The patientis allergic to atorvastatin and simvastatin.    Social history:  Social History     Tobacco Use     Smoking status: Former Smoker     Smokeless tobacco: Never Used   Substance Use Topics     Alcohol use: Yes     Comment: rarely     Marital status: .    Review of Systems:  Nursing Notes:   Shayy Valdez, EMT  4/2/2019  1:18 PM  Signed  Chief Complaint   Patient presents with     Consult     Extramammary Paget's disease of perianal skin.       Vitals:    04/02/19 1316   BP: 142/76   BP Location: Left arm   Patient Position: Sitting   Cuff Size: Adult Regular   Pulse: 81   SpO2: 98%   Weight: 155 lb 8 oz   Height: 5' 6\"       Body mass index is 25.1 kg/m .    Shayy Valdez, EMT     Von Owen MD   Professor and Chief  Division of Colon and Rectal Surgery  [facility]    Referring Provider:  Fracisco Gtz MD  [address]  [place] [address]     Primary Care Provider:  Eb Guthrie    This note was created using speech recognition software and may contain unintended word substitutions.    "

## 2019-04-02 NOTE — PATIENT INSTRUCTIONS
Follow up:  1. Schedule Surgery   2. Schedule Pre-Op    Please call with any questions or concerns regarding your clinic visit today.    It is a pleasure being involved in your health care.    Contacts post-consultation depending on your need:    Radiology Appointments [phone]    Schedule Clinic Appointments [phone] # 1   M-F 7:30 - 5 pm    [facility] [phone]    Clinic Fax Number [phone]    Surgery Scheduling [phone]    My Chart is available 24 hours a day and is a secure way to access your records and communicate with your care team.  I strongly recommend signing up if you haven't already done so, if you are comfortable with computers.  If you would like to inquire about this or are having problems with My Chart access, you may call [phone] or go online at [email or web address].  Please allow at least 24 hours for a response and extra time on weekends and Holidays.    BOWEL PREP: MIRALAX/GATORADE    Please go to your local pharmacy or store and purchase:    - 8.3 oz bottle of  Miralax (Powder)  - 10 oz bottle of Magnesium Citrate  - 64 oz of Gatorade (no red or purple)    FIVE DAYS BEFORE YOUR SURGERY  - Stop taking any of the following over-the-counter medications: Ibuprofen, Asprin,Iron supplements.    - If you are taking a blood thinner medication such as Coumadin, Plavix, or Warfarin, you MUST contact the prescribing physician regarding clearance for this procedure, or instructions for stopping/changing this medication before your procedure.     -Please contact the nurse line at [phone] option #3 for any questions regarding other medications.     THREE DAYS BEFORE YOUR SURGERY  - Begin restricted low-residue diet.  Suggested foods include: white bread or rolls, plain crackers, white rice, skinless potatoes, low fiber cereals, chicken, turkey, fish or seafood, and eggs.  You may also have applesauce, pear sauce, soft honeydew, cantaloupe, ripe banana, canned fruit  without seeds or skins.      - Do not eat: Corn (any form), raw vegetables, foods with seeds, whole wheat or grain breads and cereals, brown or wild rice, granola, raisins and dried fruit, berries, popcorn, all varieties of nuts, peanuts, and seeds.     THE DAY BEFORE YOUR SURGERY    - Begin allowed clear liquid diet at breakfast time.    - Mix Miralax and 64 oz. of Gatorade in a pitcher, and place in the fridge.      - Begin the allowed clear liquid diet at breakfast time.  Drink at least 1 (one) gallon of water or allowed clear liquids throughout the day.      4:00 P.M.  Start drinking one 8-ounce glass of the solution every 15-30 minutes. If you start to feel nauseous, you may space out your drinking intervals.     8:00 P.M. Drink the bottle of Magnesium Citrate.    You may have clear liquids until 2 hours prior to your procedure.    ALLOWED CLEAR LIQUIDS  - Water  - Regular or decaf coffee (no cream)  - Jell-O or popsicles (no red or purple)  - Plain hard candy (no red or purple)  - Clear juices or Gatorade (no red or purple)  - Chicken broth (no vegetables or noodles)    DO NOT DRINK  - Milk or mild products such as ice cream, malts, or shakes  - Red or purple juices of any kind  - Ozzy-aid, cranberry, cherry, or grape juice  - Juice with pulp (orange, grapefruit, pineapple, or tomato)  - Cream soups of any kind  - Alcoholic beverages

## 2019-04-02 NOTE — NURSING NOTE
"Chief Complaint   Patient presents with     Consult     Extramammary Paget's disease of perianal skin.       Vitals:    04/02/19 1316   BP: 142/76   BP Location: Left arm   Patient Position: Sitting   Cuff Size: Adult Regular   Pulse: 81   SpO2: 98%   Weight: 155 lb 8 oz   Height: 5' 6\"       Body mass index is 25.1 kg/m .      Shayy Valedz EMT                      "

## 2019-04-04 ENCOUNTER — TELEPHONE (OUTPATIENT)
Dept: SURGERY | Facility: CLINIC | Age: 69
End: 2019-04-04

## 2019-04-04 NOTE — TELEPHONE ENCOUNTER
Patient is scheduled for surgery with Dr. Owen      Spoke or left message with: Men    Date of Surgery: 5/24/2019    [facility]    H&P: Scheduled with PCP    Additional imaging/appointments: Post op with Fabby Parker scheduled    Surgery packet: Will be mailed

## 2019-05-23 ENCOUNTER — ANESTHESIA EVENT (OUTPATIENT)
Dept: SURGERY | Facility: AMBULATORY SURGERY CENTER | Age: 69
End: 2019-05-23

## 2019-05-24 ENCOUNTER — ANESTHESIA (OUTPATIENT)
Dept: SURGERY | Facility: AMBULATORY SURGERY CENTER | Age: 69
End: 2019-05-24

## 2019-05-24 ENCOUNTER — HOSPITAL ENCOUNTER (OUTPATIENT)
Facility: AMBULATORY SURGERY CENTER | Age: 69
End: 2019-05-24
Attending: COLON & RECTAL SURGERY
Payer: MEDICARE

## 2019-05-24 VITALS
TEMPERATURE: 97 F | SYSTOLIC BLOOD PRESSURE: 112 MMHG | OXYGEN SATURATION: 98 % | RESPIRATION RATE: 16 BRPM | DIASTOLIC BLOOD PRESSURE: 70 MMHG

## 2019-05-24 DIAGNOSIS — G89.18 POST-OP PAIN: Primary | ICD-10-CM

## 2019-05-24 PROCEDURE — 88342 IMHCHEM/IMCYTCHM 1ST ANTB: CPT | Performed by: COLON & RECTAL SURGERY

## 2019-05-24 PROCEDURE — 88341 IMHCHEM/IMCYTCHM EA ADD ANTB: CPT | Performed by: COLON & RECTAL SURGERY

## 2019-05-24 PROCEDURE — 88305 TISSUE EXAM BY PATHOLOGIST: CPT | Performed by: COLON & RECTAL SURGERY

## 2019-05-24 RX ORDER — PROPOFOL 10 MG/ML
INJECTION, EMULSION INTRAVENOUS CONTINUOUS PRN
Status: DISCONTINUED | OUTPATIENT
Start: 2019-05-24 | End: 2019-05-24

## 2019-05-24 RX ORDER — BUPIVACAINE HYDROCHLORIDE AND EPINEPHRINE 2.5; 5 MG/ML; UG/ML
INJECTION, SOLUTION INFILTRATION; PERINEURAL PRN
Status: DISCONTINUED | OUTPATIENT
Start: 2019-05-24 | End: 2019-05-24 | Stop reason: HOSPADM

## 2019-05-24 RX ORDER — SODIUM CHLORIDE, SODIUM LACTATE, POTASSIUM CHLORIDE, CALCIUM CHLORIDE 600; 310; 30; 20 MG/100ML; MG/100ML; MG/100ML; MG/100ML
INJECTION, SOLUTION INTRAVENOUS CONTINUOUS
Status: DISCONTINUED | OUTPATIENT
Start: 2019-05-24 | End: 2019-05-25 | Stop reason: HOSPADM

## 2019-05-24 RX ORDER — GABAPENTIN 100 MG/1
100 CAPSULE ORAL ONCE
Status: COMPLETED | OUTPATIENT
Start: 2019-05-24 | End: 2019-05-24

## 2019-05-24 RX ORDER — OXYCODONE HYDROCHLORIDE 5 MG/1
5-10 TABLET ORAL EVERY 6 HOURS PRN
Qty: 8 TABLET | Refills: 0 | Status: ON HOLD | OUTPATIENT
Start: 2019-05-24 | End: 2019-09-01

## 2019-05-24 RX ORDER — ONDANSETRON 2 MG/ML
4 INJECTION INTRAMUSCULAR; INTRAVENOUS EVERY 30 MIN PRN
Status: DISCONTINUED | OUTPATIENT
Start: 2019-05-24 | End: 2019-05-25 | Stop reason: HOSPADM

## 2019-05-24 RX ORDER — ONDANSETRON 4 MG/1
4 TABLET, ORALLY DISINTEGRATING ORAL
Status: CANCELLED | OUTPATIENT
Start: 2019-05-24

## 2019-05-24 RX ORDER — SODIUM CHLORIDE, SODIUM LACTATE, POTASSIUM CHLORIDE, CALCIUM CHLORIDE 600; 310; 30; 20 MG/100ML; MG/100ML; MG/100ML; MG/100ML
INJECTION, SOLUTION INTRAVENOUS CONTINUOUS PRN
Status: DISCONTINUED | OUTPATIENT
Start: 2019-05-24 | End: 2019-05-24

## 2019-05-24 RX ORDER — AMOXICILLIN 250 MG
1-2 CAPSULE ORAL 2 TIMES DAILY
Qty: 30 TABLET | Refills: 0 | Status: SHIPPED | OUTPATIENT
Start: 2019-05-24 | End: 2019-05-24

## 2019-05-24 RX ORDER — FENTANYL CITRATE 50 UG/ML
25-50 INJECTION, SOLUTION INTRAMUSCULAR; INTRAVENOUS EVERY 5 MIN PRN
Status: DISCONTINUED | OUTPATIENT
Start: 2019-05-24 | End: 2019-05-25 | Stop reason: HOSPADM

## 2019-05-24 RX ORDER — NALOXONE HYDROCHLORIDE 0.4 MG/ML
.1-.4 INJECTION, SOLUTION INTRAMUSCULAR; INTRAVENOUS; SUBCUTANEOUS
Status: DISCONTINUED | OUTPATIENT
Start: 2019-05-24 | End: 2019-05-25 | Stop reason: HOSPADM

## 2019-05-24 RX ORDER — DEXAMETHASONE SODIUM PHOSPHATE 4 MG/ML
INJECTION, SOLUTION INTRA-ARTICULAR; INTRALESIONAL; INTRAMUSCULAR; INTRAVENOUS; SOFT TISSUE PRN
Status: DISCONTINUED | OUTPATIENT
Start: 2019-05-24 | End: 2019-05-24

## 2019-05-24 RX ORDER — EPHEDRINE SULFATE 50 MG/ML
INJECTION, SOLUTION INTRAMUSCULAR; INTRAVENOUS; SUBCUTANEOUS PRN
Status: DISCONTINUED | OUTPATIENT
Start: 2019-05-24 | End: 2019-05-24

## 2019-05-24 RX ORDER — OXYCODONE HYDROCHLORIDE 5 MG/1
5 TABLET ORAL
Status: CANCELLED | OUTPATIENT
Start: 2019-05-24

## 2019-05-24 RX ORDER — ONDANSETRON 4 MG/1
4 TABLET, ORALLY DISINTEGRATING ORAL EVERY 30 MIN PRN
Status: DISCONTINUED | OUTPATIENT
Start: 2019-05-24 | End: 2019-05-25 | Stop reason: HOSPADM

## 2019-05-24 RX ORDER — OXYCODONE HYDROCHLORIDE 5 MG/1
5 TABLET ORAL EVERY 4 HOURS PRN
Status: DISCONTINUED | OUTPATIENT
Start: 2019-05-24 | End: 2019-05-25 | Stop reason: HOSPADM

## 2019-05-24 RX ORDER — ONDANSETRON 2 MG/ML
INJECTION INTRAMUSCULAR; INTRAVENOUS PRN
Status: DISCONTINUED | OUTPATIENT
Start: 2019-05-24 | End: 2019-05-24

## 2019-05-24 RX ORDER — ACETAMINOPHEN 325 MG/1
975 TABLET ORAL ONCE
Status: COMPLETED | OUTPATIENT
Start: 2019-05-24 | End: 2019-05-24

## 2019-05-24 RX ORDER — FENTANYL CITRATE 50 UG/ML
INJECTION, SOLUTION INTRAMUSCULAR; INTRAVENOUS PRN
Status: DISCONTINUED | OUTPATIENT
Start: 2019-05-24 | End: 2019-05-24

## 2019-05-24 RX ORDER — ACETAMINOPHEN 325 MG/1
975 TABLET ORAL ONCE
Status: DISCONTINUED | OUTPATIENT
Start: 2019-05-24 | End: 2019-05-25 | Stop reason: HOSPADM

## 2019-05-24 RX ADMIN — PROPOFOL 100 MCG/KG/MIN: 10 INJECTION, EMULSION INTRAVENOUS at 15:34

## 2019-05-24 RX ADMIN — ACETAMINOPHEN 975 MG: 325 TABLET ORAL at 14:03

## 2019-05-24 RX ADMIN — FENTANYL CITRATE 50 MCG: 50 INJECTION, SOLUTION INTRAMUSCULAR; INTRAVENOUS at 15:30

## 2019-05-24 RX ADMIN — DEXAMETHASONE SODIUM PHOSPHATE 4 MG: 4 INJECTION, SOLUTION INTRA-ARTICULAR; INTRALESIONAL; INTRAMUSCULAR; INTRAVENOUS; SOFT TISSUE at 15:22

## 2019-05-24 RX ADMIN — GABAPENTIN 100 MG: 100 CAPSULE ORAL at 14:03

## 2019-05-24 RX ADMIN — SODIUM CHLORIDE, SODIUM LACTATE, POTASSIUM CHLORIDE, CALCIUM CHLORIDE: 600; 310; 30; 20 INJECTION, SOLUTION INTRAVENOUS at 15:01

## 2019-05-24 RX ADMIN — EPHEDRINE SULFATE 10 MG: 50 INJECTION, SOLUTION INTRAMUSCULAR; INTRAVENOUS; SUBCUTANEOUS at 16:02

## 2019-05-24 RX ADMIN — ONDANSETRON 4 MG: 2 INJECTION INTRAMUSCULAR; INTRAVENOUS at 15:22

## 2019-05-24 NOTE — ANESTHESIA CARE TRANSFER NOTE
Patient: Sweetie Carrero    Procedure(s):  Examination Under Anesthesia with Perianal Mapping and Biopsies  Colonoscopy    Diagnosis: Paget's Disease  Diagnosis Additional Information: No value filed.    Anesthesia Type:   No value filed.     Note:  Airway :Room Air  Patient transferred to:Phase II  Handoff Report: Identifed the Patient, Identified the Reponsible Provider, Reviewed the pertinent medical history, Discussed the surgical course, Reviewed Intra-OP anesthesia mangement and issues during anesthesia, Set expectations for post-procedure period and Allowed opportunity for questions and acknowledgement of understanding      Vitals: (Last set prior to Anesthesia Care Transfer)    CRNA VITALS  5/24/2019 1624 - 5/24/2019 1703      5/24/2019             Pulse:  95    SpO2:  98 %    Resp Rate (set):  10                Electronically Signed By: KERRY Neri CRNA  May 24, 2019  5:03 PM

## 2019-05-24 NOTE — BRIEF OP NOTE
[facility]    Brief Operative Note    Pre-operative diagnosis: Paget's Disease  Post-operative diagnosis Same  Procedure: Procedure(s):  Examination Under Anesthesia with Perianal Mapping and Biopsies  Colonoscopy  Surgeon: Surgeon(s) and Role:     * Von Owen MD - Primary     * Kameron Reyez MD - Fellow - Assisting  Anesthesia: Monitor Anesthesia Care   Estimated blood loss: 2cc  Drains: None  Specimens:   ID Type Source Tests Collected by Time Destination   A : Anterior Perineum Tissue Perineum SURGICAL PATHOLOGY EXAM Von Owen MD 5/24/2019  4:29 PM    B : Left Posterior Perianal Skin Tissue Anus SURGICAL PATHOLOGY EXAM Von Owen MD 5/24/2019  4:32 PM    C : Posterior Midline Perianal Skin Tissue Anus SURGICAL PATHOLOGY EXAM Von Owen MD 5/24/2019  4:33 PM    D : Right Posterior Perianal SKin Tissue Anus SURGICAL PATHOLOGY EXAM Von Owen MD 5/24/2019  4:34 PM    E : Left Posterior Verge Perianal Skin Tissue Anus SURGICAL PATHOLOGY EXAM Von Owen MD 5/24/2019  4:36 PM    F : Right Posterior Verge Perianal Skin Tissue Anus SURGICAL PATHOLOGY EXAM Von Owen MD 5/24/2019  4:37 PM    G : Left Mid-anal Perianal Skin Tissue Anus SURGICAL PATHOLOGY EXAM oVn Owen MD 5/24/2019  4:37 PM    H : Right mid-anal perianal skin Tissue Anus SURGICAL PATHOLOGY EXAM Von Owen MD 5/24/2019  4:37 PM    I : Left anterior verge perianal skin Tissue Anus SURGICAL PATHOLOGY EXAM Von Owen MD 5/24/2019  4:38 PM    J : Midline anterior verge perianal skin Tissue Anus SURGICAL PATHOLOGY EXAM Von Owen MD 5/24/2019  4:39 PM    K : Right Anterior Verge perianal skin Tissue Anus SURGICAL PATHOLOGY EXAM Von Owen MD 5/24/2019  4:40 PM    L : Left Anterior Perianal Skin Tissue Anus SURGICAL PATHOLOGY EXAM Von Owen MD 5/24/2019  4:41 PM    M : Anterior Midline Perianal Skin Tissue Anus SURGICAL PATHOLOGY EXAM Lexie  MD Von 5/24/2019  4:41 PM    N : Right Anterior Perianal Skin Tissue Anus SURGICAL PATHOLOGY EXAM Von Owen MD 5/24/2019  4:42 PM      Findings:   Diverticulosis. No polyps or biopsies taken on colonoscopy. .  Complications: None.  Implants:  None

## 2019-05-24 NOTE — OR NURSING
Pt opted to take hard copy of oxy rx with him as they have some leftover from previous surgery, also have fiber supplements and senna at home as well.

## 2019-05-24 NOTE — ANESTHESIA POSTPROCEDURE EVALUATION
Anesthesia POST Procedure Evaluation    Patient: Sweetie Carrero   MRN:     [id number] Gender:   male   Age:    69 year old :      1950        Preoperative Diagnosis: Paget's Disease   Procedure(s):  Examination Under Anesthesia with Perianal Mapping and Biopsies  Colonoscopy   Postop Comments: No value filed.       Anesthesia Type:  MAC  No value filed.    Reportable Event: NO     PAIN: Uncomplicated   Sign Out status: Comfortable, Well controlled pain     PONV: No PONV   Sign Out status:  No Nausea or Vomiting     Neuro/Psych: Uneventful perioperative course   Sign Out Status: Preoperative baseline; Age appropriate mentation     Airway/Resp.: Uneventful perioperative course   Sign Out Status: Non labored breathing, age appropriate RR; Resp. Status within EXPECTED Parameters     CV: Uneventful perioperative course   Sign Out status: Appropriate BP and perfusion indices; Appropriate HR/Rhythm     Disposition:   Sign Out in:  PACU  Disposition:  Phase II; Home  Recovery Course: Uneventful  Follow-Up: Not required           Last Anesthesia Record Vitals:  CRNA VITALS  2019 1624 - 2019 1724      2019             Pulse:  95    SpO2:  98 %    Resp Rate (set):  10          Last PACU Vitals:  Vitals Value Taken Time   /70 2019  4:55 PM   Temp 36.1  C (97  F) 2019  4:55 PM   Pulse     Resp 16 2019  4:55 PM   SpO2 98 % 2019  4:55 PM   Temp src     NIBP 128/86 2019  4:51 PM   Pulse 95 2019  4:54 PM   SpO2 98 % 2019  4:54 PM   Resp     Temp     Ht Rate 95 2019  4:51 PM   Temp 2           Electronically Signed By: Brian Duffy MD, May 24, 2019, 5:55 PM

## 2019-05-24 NOTE — ANESTHESIA PREPROCEDURE EVALUATION
Anesthesia Pre-Procedure Evaluation    Patient: Sweetie Carrero   MRN:     [id number] Gender:   male   Age:    69 year old :      1950        Preoperative Diagnosis: Paget's Disease   Procedure(s):  Examination Under Anesthesia with Perianal Mapping and Biopsies  Colonoscopy     Past Medical History:   Diagnosis Date     Diabetes (H)     controlled by diet      Hypertension      Paget's disease of skin of scrotum (H)       Past Surgical History:   Procedure Laterality Date     EXPLORE SCROTUM N/A 2018    Procedure: Wide Local Excision of Scrotal Lesions;  Surgeon: rFacisco Gtz MD;  Location: [facility] OR     GRAFT SKIN SPLIT THICKNESS FROM EXTREMITY Left 2019    Procedure: Split Thickness Skin Graft From Left Thigh;  Surgeon: Fracisco Gtz MD;  Location: [facility] OR     SCROTOPLASTY N/A 2019    Procedure: Removal Of Scrotal Skin;  Surgeon: Fracisco Gtz MD;  Location: [facility] OR          Anesthesia Evaluation     .             ROS/MED HX    ENT/Pulmonary:       Neurologic:       Cardiovascular:     (+) Dyslipidemia, hypertension----. : . . . :. .       METS/Exercise Tolerance:     Hematologic:         Musculoskeletal:         GI/Hepatic:         Renal/Genitourinary:         Endo:     (+) type II DM .      Psychiatric:         Infectious Disease:         Malignancy:   (+) Malignancy   Extramammar pagets disease of scrotum        Other:                         PHYSICAL EXAM:   Mental Status/Neuro: A/A/O   Airway: Facies: Feasible  Mallampati: I  Mouth/Opening: Full  TM distance: > 6 cm  Neck ROM: Full   Respiratory: Auscultation: CTAB     Resp. Rate: Normal     Resp. Effort: Normal      CV: Rhythm: Regular  Rate: Age appropriate  Heart: Normal Sounds   Comments:      Dental: Normal                  Lab Results   Component Value Date    WBC 7.2 2019    HGB 14.0 2019    HCT 44.4 2019     2019     2019    POTASSIUM 3.7 2019    CHLORIDE 108  "02/22/2019    CO2 26 02/22/2019    BUN 7 02/22/2019    CR 0.69 02/22/2019     (H) 02/22/2019    NICK 8.2 (L) 02/22/2019       Preop Vitals  BP Readings from Last 3 Encounters:   05/24/19 152/79   04/02/19 142/76   03/17/19 146/73    Pulse Readings from Last 3 Encounters:   04/02/19 81   03/17/19 80   03/11/19 79      Resp Readings from Last 3 Encounters:   05/24/19 18   03/17/19 18   02/23/19 16    SpO2 Readings from Last 3 Encounters:   05/24/19 97%   04/02/19 98%   03/17/19 96%      Temp Readings from Last 1 Encounters:   05/24/19 36.7  C (98  F) (Oral)    Ht Readings from Last 1 Encounters:   04/02/19 1.676 m (5' 6\")      Wt Readings from Last 1 Encounters:   04/02/19 70.5 kg (155 lb 8 oz)    Estimated body mass index is 25.1 kg/m  as calculated from the following:    Height as of 4/2/19: 1.676 m (5' 6\").    Weight as of 4/2/19: 70.5 kg (155 lb 8 oz).     LDA:  Peripheral IV 02/21/19 Left Lower forearm (Active)   Number of days: 92       Peripheral IV 05/24/19 Right Hand (Active)   Site Assessment WDL 5/24/2019  2:00 PM   Line Status Infusing 5/24/2019  2:00 PM   Phlebitis Scale 0-->no symptoms 5/24/2019  2:00 PM   Infiltration Scale 0 5/24/2019  2:00 PM   Number of days: 0       Urethral Catheter Double-lumen;Non-latex;Straight-tip 16 fr (Active)   Number of days: 92            Assessment:   ASA SCORE: 3    NPO Status: > 6 hours since completed Solid Foods   Documentation: H&P complete; Preop Testing complete; Consents complete   Proceeding: Proceed without further delay  Tobacco Use:  NO Active use of Tobacco/UNKNOWN Tobacco use status     Plan:   Anes. Type:  MAC   Pre-Induction: Midazolam IV   Induction:  IV (Standard)   Airway: Native Airway   Access/Monitoring: PIV   Maintenance: Propofol; IV   Emergence: Procedure Site   Logistics: Same Day Surgery     Postop Pain/Sedation Strategy:  Standard-Options: Opioids PRN     PONV Management:  Adult Risk Factors:, Non-Smoker, Postop Opioids  Prevention: " Propofol Infusion; Ondansetron     CONSENT: Direct conversation   Plan and risks discussed with: Patient   Blood Products: Consent Deferred (Minimal Blood Loss)                         Brian Duffy MD

## 2019-05-24 NOTE — DISCHARGE INSTRUCTIONS
[facility]  Home Care Following Anesthesia  For 24 hours after surgery:  1. Get plenty of rest.  A responsible adult must stay with you for at least 24 hours after you leave the surgery center.  2. Do not drive or use heavy equipment.  If you have weakness or tingling, don't drive or use heavy equipment until this feeling goes away.   3. Do not drink alcohol.   4. Avoid strenuous or risky activities.  Ask for help when climbing stairs.  5. You may feel lightheaded.  IF so, sit for a few minutes before standing.  Have someone help you get up.   6. If you have nausea (feel sick to your stomach): Drink only clear liquids such as apple juice, ginger ale, broth or 7-Up.  Rest may also help.  Be sure to drink enough fluids.  Move to a regular diet as you feel able.   7. You may have a slight fever.  Call the doctor if your fever is over 100 F (37.7 C) (taken under the tongue) or lasts longer than 24 hours.  8. You may have a dry mouth, a sore throat, muscle aches or trouble sleeping. These should go away after 24 hours.  9. Do not make important or legal decisions.               Tips for taking pain medications  To get the best pain relief possible, remember these points:    Take pain medications as directed, before pain becomes severe.    Pain medication can upset your stomach: taking it with food may help.    Constipation is a common side effect of pain medication. Drink plenty of  fluids.    Eat foods high in fiber. Take a stool softener if recommended by your doctor or pharmacist.    Do not drink alcohol, drive or operate machinery while taking pain medications.    Ask about other ways to control pain, such as with heat, ice or relaxation.    Tylenol/Acetaminophen Consumption  To help encourage the safe use of acetaminophen, the makers of TYLENOL  have lowered the maximum daily dose for single-ingredient Extra Strength TYLENOL  (acetaminophen) products sold in the [place] from 8  pills per day (4,000 mg) to 6 pills per day (3,000 mg). The dosing interval has also changed from 2 pills every 4-6 hours to 2 pills every 6 hours.    If you feel your pain relief is insufficient, you may take Tylenol/Acetaminophen in addition to your narcotic pain medication.     Be careful not to exceed 3,000 mg of Tylenol/Acetaminophen in a 24 hour period from all sources.    If you are taking extra strength Tylenol/acetaminophen (500 mg), the maximum dose is 6 tablets in 24 hours.    If you are taking regular strength acetaminophen (325 mg), the maximum dose is 9 tablets in 24 hours.    Call a doctor for any of the followin. Signs of infection (fever, growing tenderness at the surgery site, a large amount of drainage or bleeding, severe pain, foul-smelling drainage, redness, swelling).  2. It has been over 8 to 10 hours since surgery and you are still not able to urinate (pass water).  3. Headache for over 24 hours.  4. Numbness, tingling or weakness the day after surgery (if you had spinal anesthesia).  Your doctor is:       Dr. Von Owen, Colon Rectal: [phone]               Or dial [phone] and ask for the resident on call for:  Colon Rectal  For emergency care, call the:  [facility]:  [phone] (TTY for hearing impaired: [phone])            Anorectal Surgery Instructions    What can I expect after anorectal surgery?  Most anorectal procedures are done as outpatient surgery, and you go home the same day as the procedure. A few surgical procedures will require that you stay in the hospital for about one to three days. No matter where the procedure is done or how long or short it takes, these recommendations will help you heal and feel more comfortable.    Medicines:  The anal area is very sensitive; you can expect to have some pain for up to 2-4 weeks after the procedure. Your doctor will give you a prescription for one or more pain medications.    Take naprosyn 500  mg twice a day OR ibuprofen 600 mg four times a day     Take this on a regular basis (not as needed) following your surgery.     The drugs are best taken with food.  Do not take if it causes stomach upset or if you have a history of ulcers or gastritis. You can stop the naprosyn (or ibuprofen) or reduce the dose when you are feeling better.    DO NOT use naprosyn, ibuprofen, or other similar agents (eg. Advil or Aleve) if you have inflammatory bowel disease (Ulcerative Colitis or Crohn's disease) or if your doctor as advised you against using these medications    Take acetominaphen (Tylenol) 650-1000 mg four times a day.     Take this on a regular basis (not as needed) following surgery for pain control.     Take the lower dose if you are >65 years old or have liver disease. The maximum dose of acetominaphen is 4000 mg a day. You can stop the acetaminophen or reduce the dose when you are feeling better.    It is important to realize that many narcotic pain relievers (including vicodin, percocet, tylenol #3) also have acetaminophen, and excessive doses of acetaminophen can be dangerous, so do not take these in addition to acetominaphen.  You may take narcotics that don't contain acetominaphen such as oxycodone.      Take oxycodone AS NEEDED in addition to the acetominaphen and naprosyn.      Because narcotics have side effects (including constipation), you should reduce your use of these medications as tolerated as your pain improves.    *In general, the best strategy is to take (if you are able to tolerate it) the tylenol and naprosen on a regular basis until your pain has largely gone away. You can take the narcotic pain medicine as needed in addition to the tylenol and ibuprofen. As your pain begins to lessen, you should cut back on your narcotic use while continuing to take your regular tylenol and naprosyn doses.      Refilling prescriptions. If you need additional pain medication, please call the triage nurse  at [phone] during normal business hours (8 a.m. to 4 p.m., Monday though Friday) or have your pharmacy fax a refill request to [phone]. If you call after hours or on the weekends, the doctor on call may not know you personally and may not renew narcotic pain medication by phone. Call your primary care provider for all other medication refills.    Perineal care:  External gauze dressing can be removed the morning after surgery. If you have an adhesive dressing stuck to the incision, DO NOT remove this.   Tub baths:    If possible, take a tub bath immediately after each bowel movement.     Baths should be take at least 3 times daily for the first week to 10 days following your procedure. You should soak in the tub for 10 to 15 minutes each time with water as warm as you can tolerate.     Even after you go back to work, it is a good idea to sit in the tub in the morning, after returning from work, and again in the evening before bedtime.    Bleeding/Infection:    You can expect to have some bleeding after bowel movements, but it should stop soon after you wipe.     Use a wet cloth or perianal pad (Tucks or Preparation H pads) to gently wipe the area after each bowel movement.    Do not rub the anal area or use a lot of pressure.    Using a spray bottle filled with warm water helps loosen any remaining stool. Blot gently with a soft dry cloth or tissue paper.    Infection around the anal opening is not very common. The anal area has excellent blood supply, which helps the area to heal. Bloody discharge after bowel movements is normal and may last 2 to 4 weeks after your surgery. However, if you bleed between bowel movements and cannot get it to stop, call the triage nurse immediately [phone].    Bowel function:  Take a fiber supplement such as Metamucil, which is over the counter. It is important to drink six to eight glasses of water or juice everyday when using fiber products.    If you do not have a  bowel movement after 1-2 days:    Take Milk of Magnesia-2 tablespoons.       If there are no results, repeat this or add over the counter Miralax.      If you still do not have results, contact the clinic.     If there are no results, repeat this. Stop taking Milk of Magnesia or other laxatives if you begin to have diarrhea.    * Constipation will cause you to strain when you have a bowel movement. The hard stool will be difficult to pass, will increase pain and bleeding, and will slow down healing.  Try to avoid constipation and/or diarrhea as this can make the pain and bleeding worse.    * It is important to have regular bowel movements at least every other day and to keep your stool soft.  A high fiber diet, including at least four servings of fruits or vegetables daily, will help to keep your bowel movements regular and soft.    Activity:  After your procedure, there are no restrictions on your activity     except restrictions surrounding being on narcotics and in pain, such as no heavy machine operating or driving.     You may walk, climb stairs, ride in a car, and sit as tolerated.     It is helpful to avoid sitting in one position for long periods (2 or more hours).    After some surgeries, you may be told not to perform any lifting (more than 10 pounds) for several weeks after surgery.    When to call:  When do I need to call the doctor or triage nurse?    If you experience any of the problems listed here, call our triage nurse during business hours ([phone]).     The nurse will help you with your problem or have the doctor call you.     After hours and on weekends, please call the main hospital number ([phone]) and ask for the colon and rectal surgery person on call.     Some is available to help you 24 hours a day, seven days a week.    Call for:   ? Fever greater than 101 degrees   ? Chills   ? Foul-smelling drainage   ? Nausea and vomiting   ? Diarrhea - greater than 3 water stools in 24  hours   ? Constipation - no bowel movement after 3 days   ? Severe bleeding that does not stop soon after a bowel movement   ? Problems with the incision, including increased pain, swelling, or redness

## 2019-05-25 NOTE — OP NOTE
Procedure Date: 05/24/2019      PREOPERATIVE DIAGNOSIS:  History of scrotal Paget disease, status post scrotal excision with positive posterior margin.      POSTOPERATIVE DIAGNOSES:   1.  History of scrotal Paget disease, status post scrotal excision with positive posterior margin.   2.  Perineal condylomata.     3.  Right colon diverticulosis.      HISTORY:  This 68-year-old man underwent total scrotectomy and reconstruction for Paget disease by Dr. Fracisco Gtz on 02/21/2019.  Final pathology demonstrated a positive posterior margin.  The patient was referred for perianal evaluation.  I advised a colonoscopy to exclude a large bowel adenocarcinoma as well as mapping biopsies.  I discussed the risks and alternatives in detail with the patient and his wife.  I answered all of their questions to their stated satisfaction.  They expressed their understanding and the patient provided written informed consent.      SURGEON:  Von Owen MD      ASSISTANT:     1.  Sandra Prieto MD   2.  Laquita Bryan MS4      DESCRIPTION OF PROCEDURE:  With the patient in the left lateral decubitus position on the gurney and under intravenous sedation by Anesthesia, timeout was performed and the patient and procedure confirmed.  Digital rectal examination demonstrated a small prolapsable hypertrophied anal papilla but no other lesions.  Colonoscopy was performed without difficulty to the cecum and into the terminal ileum using the Olympus adult colonoscope.  Images were obtained via the [employer] system and imported into the Epic electronic medical record.  The colonoscope was withdrawn and a careful inspection was made of the entire mucosal surface of the colon.  I intubated the terminal ileum for a distance of about 10 cm and this was entirely normal.  There were several scattered diverticula in the ascending colon.  Preparation was very good to excellent.  There were no mucosal lesions throughout the length of the large  bowel.  Retroflexion in the rectum showed only a hypertrophied anal papilla.  The scope was withdrawn and this portion of the procedure was terminated.  No anal abnormalities could be appreciated on slow withdrawal through the anal canal.      The patient was next placed in the prone jackknife position and the buttocks were taped apart.  The perianal skin and perineum were prepped and draped in a sterile fashion.  There appeared to be multiple minute condylomata in the anterior perineal body.  Several of these were removed with a shave biopsy and sent to pathology.  The remaining presumptive warts were cauterized and curetted, and the curettage was sent along with the original biopsies.  We placed a local perianal block of 0.25% Marcaine with epinephrine.  I mapped out a grid of perianal biopsies.  There was a linear well-healed midline scar extending close to the anal margin.  I selected levels for biopsy 1 cm anterior to the anal margin, at the level of the anterior anal margin, at the level of the mid-anal canal, at the level of the posterior anal margin, and 1 cm posterior to the posterior anal margin.  Biopsies were obtained in the left, right and where the anus was not, midline positions.  We used a 3 mm skin punch to obtain biopsies of all of these sites.  The wounds were closed with interrupted 4-0 Monocryl.  I numbered all of the sites with a marking pen and imported a photograph into Epic that confirmed the presence of the various biopsy sites.  A bacitracin dressing was applied and procedure was then terminated.  There was no blood loss.  Sponge, needle and instrument counts were reported as correct at the conclusion of the case x2.         TAMERA VIRK MD             D: 2019   T: 2019   MT: gregor      Name:     IAN SAWYER   MRN:      [id number]        Account:        [id number]   :      1950           Procedure Date: 2019      Document: [id number]       cc: Eb Guthrie MD        TALHA Gtz MD       [facility] Billing

## 2019-05-30 ENCOUNTER — TELEPHONE (OUTPATIENT)
Dept: SURGERY | Facility: CLINIC | Age: 69
End: 2019-05-30

## 2019-05-31 LAB — COPATH REPORT: NORMAL

## 2019-06-05 ENCOUNTER — TELEPHONE (OUTPATIENT)
Dept: UROLOGY | Facility: CLINIC | Age: 69
End: 2019-06-05

## 2019-06-05 NOTE — TELEPHONE ENCOUNTER
[facility] Call Center    Phone Message    May a detailed message be left on voicemail: yes    Reason for Call: Other: Men calling to request a call back. He has some questions. Please call him back.      Action Taken: Message routed to:  [facility] ([facility]): adri uro

## 2019-06-13 DIAGNOSIS — C63.2 EXTRAMAMMARY PAGET'S DISEASE OF SCROTUM (H): Primary | ICD-10-CM

## 2019-06-13 RX ORDER — CEFAZOLIN SODIUM 1 G/50ML
1 INJECTION, SOLUTION INTRAVENOUS SEE ADMIN INSTRUCTIONS
Status: CANCELLED | OUTPATIENT
Start: 2019-06-13

## 2019-06-13 RX ORDER — CEFAZOLIN SODIUM 2 G/50ML
2 SOLUTION INTRAVENOUS
Status: CANCELLED | OUTPATIENT
Start: 2019-06-13

## 2019-06-14 ENCOUNTER — PATIENT OUTREACH (OUTPATIENT)
Dept: PLASTIC SURGERY | Facility: CLINIC | Age: 69
End: 2019-06-14

## 2019-06-14 NOTE — PATIENT INSTRUCTIONS
Spoke with pt regarding scheduling consult with Dr. Myles. Pt scheduled for 6/25/19 at 1:15pm. Pt confirms appt date, time, and location. Shaina VEE RNCC

## 2019-06-17 ENCOUNTER — PRE VISIT (OUTPATIENT)
Dept: PLASTIC SURGERY | Facility: CLINIC | Age: 69
End: 2019-06-17

## 2019-06-17 NOTE — TELEPHONE ENCOUNTER
FUTURE VISIT INFORMATION      FUTURE VISIT INFORMATION:    Date: 6/25/19    Time: 1:15pm    [place] [facility] [place]  REFERRAL INFORMATION:    Reason for visit/diagnosis  re-resection in perineum, closure    RECORDS REQUESTED FROM:       Clinic name Comments Records Status Imaging Status   [facility] Procedure: Colonoscopy 5/24/19 [facility]  OV 4/2/19 EPIC

## 2019-06-20 PROBLEM — K59.01 SLOW TRANSIT CONSTIPATION: Status: ACTIVE | Noted: 2019-06-20

## 2019-06-20 PROBLEM — R55 SYNCOPE, VASOVAGAL: Status: ACTIVE | Noted: 2018-12-09

## 2019-06-24 ENCOUNTER — TELEPHONE (OUTPATIENT)
Dept: UROLOGY | Facility: CLINIC | Age: 69
End: 2019-06-24

## 2019-06-24 NOTE — TELEPHONE ENCOUNTER
Patient is scheduled for surgery with Dr. Gtz      Spoke or left message with: Men    Date of Surgery: 8/9/19    Location: [facility] OR    Informed patient they will need an adult  yes    Pre-op with surgeon (if applicable): n/a    H&P: Scheduled with pcp    Additional imaging/appointments: n/a    Surgery packet: mailed 6/24/19     Additional comments: n/a

## 2019-06-25 ENCOUNTER — OFFICE VISIT (OUTPATIENT)
Dept: PLASTIC SURGERY | Facility: CLINIC | Age: 69
End: 2019-06-25
Payer: MEDICARE

## 2019-06-25 DIAGNOSIS — C44.99 PAGET DISEASE, EXTRA MAMMARY: Primary | ICD-10-CM

## 2019-06-25 ASSESSMENT — PATIENT HEALTH QUESTIONNAIRE - PHQ9
SUM OF ALL RESPONSES TO PHQ QUESTIONS 1-9: 4
SUM OF ALL RESPONSES TO PHQ QUESTIONS 1-9: 4

## 2019-06-25 ASSESSMENT — PAIN SCALES - GENERAL: PAINLEVEL: MILD PAIN (2)

## 2019-06-25 NOTE — LETTER
6/25/2019       RE: Sweetie Carrero  [address] [facility] [id number]     Dear Colleague,    Thank you for referring your patient, Sweetie Carrero, to the [facility] PLASTIC AND RECONSTRUCTIVE SURGERY at [facility]. Please see a copy of my visit note below.    REFERRING PROVIDER: Fracisco Gtz    REASON FOR CONSULTATION: Possible need for assistance with perineal closure following reexcision of Paget's disease.    HPI: Patient is a 69-year-old male with history of perineal Paget's disease, referred to me by Dr. Fracisco Gtz for evaluation of flap or graft closure of the perineal region in the case of wide perineal resection.  In February 2019, 4 months ago, patient underwent excision of scrotal Paget's disease with total scrotectomy, orchiopexy, and resurfacing of the scrotum using a split-thickness skin graft harvested from the left thigh.  Unfortunately, the posterior margin of the excision has come back positive.  To evaluate for possible perianal involvement, patient then underwent multiple biopsies around the perianal region a little less than a month ago.  All of these biopsies have since returned with no involvement of Paget's disease.  Patient is now being prepared for further reexcision of the positive posterior margin of the initial resection.  Concern is for a large defect in the perineum given that this is a reexcision.  Patient would like to minimize donor site morbidity as much as possible.  He denies any previous trauma to the thighs.  Denies any other surgery other than skin grafting of the thighs.    MEDS:   Prior to Admission medications    Medication Sig Start Date End Date Taking? Authorizing Provider   aspirin (ASA) 81 MG EC tablet Take 81 mg by mouth 11/11/15  Yes Reported, Patient   lisinopril (PRINIVIL/ZESTRIL) 40 MG tablet Take 40 mg by mouth daily   Yes Reported, Patient   mupirocin (BACTROBAN) 2 % ointment Apply topically 3 times daily   Yes Reported,  Patient   pravastatin (PRAVACHOL) 20 MG tablet TAKE ONE TABLET BY MOUTH ONCE DAILY 9/11/18  Yes Reported, Patient   oxyCODONE (ROXICODONE) 5 MG tablet Take 1-2 tablets (5-10 mg) by mouth every 6 hours as needed for severe pain  Patient not taking: Reported on 6/25/2019 5/24/19   Von Owen MD       ALLERGIES:   Allergies   Allergen Reactions     Atorvastatin Muscle Pain (Myalgia)     PN: weakness     Simvastatin Muscle Pain (Myalgia)     PN: weakness       PMH:   Past Medical History:   Diagnosis Date     Diabetes (H)     controlled by diet      Hypertension      Paget's disease of skin of scrotum (H)        PSH:   Past Surgical History:   Procedure Laterality Date     COLONOSCOPY N/A 5/24/2019    Procedure: Colonoscopy;  Surgeon: Von Owen MD;  [facility]     EXAM UNDER ANESTHESIA ANUS N/A 5/24/2019    Procedure: Examination Under Anesthesia with Perianal Mapping and Biopsies;  Surgeon: Von Owen MD;  [facility]     EXPLORE SCROTUM N/A 12/5/2018    Procedure: Wide Local Excision of Scrotal Lesions;  Surgeon: Fracisco Gtz MD;  [facility]     GRAFT SKIN SPLIT THICKNESS FROM EXTREMITY Left 2/21/2019    Procedure: Split Thickness Skin Graft From Left Thigh;  Surgeon: Fracisco Gtz MD;  [facility]     SCROTOPLASTY N/A 2/21/2019    Procedure: Removal Of Scrotal Skin;  Surgeon: Fracisco Gtz MD;  [facility] OR       SH:   Social History     Tobacco Use     Smoking status: Former Smoker     Smokeless tobacco: Never Used   Substance Use Topics     Alcohol use: Yes     Comment: rarely       FH: No family history on file.    ROS: Denies chest pain, shortness of breath, diabetes, MI, CVA, DVT, PE, and bleeding disorders.    PHYSICAL EXAMINATION:   General: No acute distress.  Peroneal examination was performed in the presence of chaperone.  This revealed a well-healed scrotal reconstruction with skin graft.  Perineum is well-healed with multiple biopsy sites  noted.  There is no area of erythema or induration or skin color change.  Everything is soft.  The inner thigh to his foot with a skin pinch of approximately 2 cm on both sides.  There is no rash or lesion.  Bilateral thighs were also examined in the anterolateral thighs are thinner distally with a 1 cm skin pinch distally and 2 cm skin pinch proximally.  There is some hair.  There is a skin graft donor site on the left thigh.  Otherwise, no rash or lesion.    ASSESSMENT: Residual perineal Paget's disease requiring further reexcision with possible large defect requiring flap closure.    PLAN: I explained to the patient that given that this is a reexcision, need for flap closure of the perineum is higher than at the initial surgery.  If required, we will first try to close the wound using local rotational flaps based off of pudendal arteries such as the single port flap.  However, the wound is too large to be closed in that manner, we may elect to perform a regional flap from an anterolateral thigh flap using potentially skin, subcutaneous tissue, fascia, and muscle.  To prepare the patient for such a  flap, I will obtain a CT angiogram to map out the perforators.  I explained these procedures in detail to the patient.  In addition, we will perform coverage of the wound following confirmation of negative margins.  I explained the surgical risks to include bleeding, infection, injury to surrounding structures, flap loss, graft loss, chronic pain, wound healing difficulties, asymmetry, contour deformity, excessive scarring, cancer recurrence, gait changes, and need for revision surgery.  Patient accepts these risks and wishes to proceed.  We will order for the CT angiogram.    Total time spent with patient was 30 min of which greater than 50% was in counseling.    Again, thank you for allowing me to participate in the care of your patient.      Sincerely,    Aditya Myles MD

## 2019-06-25 NOTE — PATIENT INSTRUCTIONS
You will need  to schedule a CTA.    Imaging Scheduling # [phone]     Please call our clinic for any questions,concerns,or worsening symptoms.      Clinic # [phone]        Fax [phone]

## 2019-06-25 NOTE — NURSING NOTE
Chief Complaint   Patient presents with     Consult     Pt here for consult for resection of perineum       There were no vitals filed for this visit.    There is no height or weight on file to calculate BMI.      HAMZAH Rayo NREMT                    No vitals taken per provider

## 2019-06-26 ASSESSMENT — PATIENT HEALTH QUESTIONNAIRE - PHQ9: SUM OF ALL RESPONSES TO PHQ QUESTIONS 1-9: 4

## 2019-07-01 NOTE — PROGRESS NOTES
REFERRING PROVIDER: Fracisco Gtz    REASON FOR CONSULTATION: Possible need for assistance with perineal closure following reexcision of Paget's disease.    HPI: Patient is a 69-year-old male with history of perineal Paget's disease, referred to me by Dr. Fracisco Gtz for evaluation of flap or graft closure of the perineal region in the case of wide perineal resection.  In February 2019, 4 months ago, patient underwent excision of scrotal Paget's disease with total scrotectomy, orchiopexy, and resurfacing of the scrotum using a split-thickness skin graft harvested from the left thigh.  Unfortunately, the posterior margin of the excision has come back positive.  To evaluate for possible perianal involvement, patient then underwent multiple biopsies around the perianal region a little less than a month ago.  All of these biopsies have since returned with no involvement of Paget's disease.  Patient is now being prepared for further reexcision of the positive posterior margin of the initial resection.  Concern is for a large defect in the perineum given that this is a reexcision.  Patient would like to minimize donor site morbidity as much as possible.  He denies any previous trauma to the thighs.  Denies any other surgery other than skin grafting of the thighs.    MEDS:   Prior to Admission medications    Medication Sig Start Date End Date Taking? Authorizing Provider   aspirin (ASA) 81 MG EC tablet Take 81 mg by mouth 11/11/15  Yes Reported, Patient   lisinopril (PRINIVIL/ZESTRIL) 40 MG tablet Take 40 mg by mouth daily   Yes Reported, Patient   mupirocin (BACTROBAN) 2 % ointment Apply topically 3 times daily   Yes Reported, Patient   pravastatin (PRAVACHOL) 20 MG tablet TAKE ONE TABLET BY MOUTH ONCE DAILY 9/11/18  Yes Reported, Patient   oxyCODONE (ROXICODONE) 5 MG tablet Take 1-2 tablets (5-10 mg) by mouth every 6 hours as needed for severe pain  Patient not taking: Reported on 6/25/2019 5/24/19   Von Owen MD        ALLERGIES:   Allergies   Allergen Reactions     Atorvastatin Muscle Pain (Myalgia)     PN: weakness     Simvastatin Muscle Pain (Myalgia)     PN: weakness       PMH:   Past Medical History:   Diagnosis Date     Diabetes (H)     controlled by diet      Hypertension      Paget's disease of skin of scrotum (H)        PSH:   Past Surgical History:   Procedure Laterality Date     COLONOSCOPY N/A 5/24/2019    Procedure: Colonoscopy;  Surgeon: Von Owen MD;  [facility]     EXAM UNDER ANESTHESIA ANUS N/A 5/24/2019    Procedure: Examination Under Anesthesia with Perianal Mapping and Biopsies;  Surgeon: Von Owen MD;  [facility]     EXPLORE SCROTUM N/A 12/5/2018    Procedure: Wide Local Excision of Scrotal Lesions;  Surgeon: Fracisco Gtz MD;  [facility]     GRAFT SKIN SPLIT THICKNESS FROM EXTREMITY Left 2/21/2019    Procedure: Split Thickness Skin Graft From Left Thigh;  Surgeon: Fracisco Gtz MD;  [facility]     SCROTOPLASTY N/A 2/21/2019    Procedure: Removal Of Scrotal Skin;  Surgeon: Fracisco Gtz MD;  [facility]       SH:   Social History     Tobacco Use     Smoking status: Former Smoker     Smokeless tobacco: Never Used   Substance Use Topics     Alcohol use: Yes     Comment: rarely       FH: No family history on file.    ROS: Denies chest pain, shortness of breath, diabetes, MI, CVA, DVT, PE, and bleeding disorders.    PHYSICAL EXAMINATION:   General: No acute distress.  Peroneal examination was performed in the presence of chaperone.  This revealed a well-healed scrotal reconstruction with skin graft.  Perineum is well-healed with multiple biopsy sites noted.  There is no area of erythema or induration or skin color change.  Everything is soft.  The inner thigh to his foot with a skin pinch of approximately 2 cm on both sides.  There is no rash or lesion.  Bilateral thighs were also examined in the anterolateral thighs are thinner distally with a 1  cm skin pinch distally and 2 cm skin pinch proximally.  There is some hair.  There is a skin graft donor site on the left thigh.  Otherwise, no rash or lesion.    ASSESSMENT: Residual perineal Paget's disease requiring further reexcision with possible large defect requiring flap closure.    PLAN: I explained to the patient that given that this is a reexcision, need for flap closure of the perineum is higher than at the initial surgery.  If required, we will first try to close the wound using local rotational flaps based off of pudendal arteries such as the single port flap.  However, the wound is too large to be closed in that manner, we may elect to perform a regional flap from an anterolateral thigh flap using potentially skin, subcutaneous tissue, fascia, and muscle.  To prepare the patient for such a  flap, I will obtain a CT angiogram to map out the perforators.  I explained these procedures in detail to the patient.  In addition, we will perform coverage of the wound following confirmation of negative margins.  I explained the surgical risks to include bleeding, infection, injury to surrounding structures, flap loss, graft loss, chronic pain, wound healing difficulties, asymmetry, contour deformity, excessive scarring, cancer recurrence, gait changes, and need for revision surgery.  Patient accepts these risks and wishes to proceed.  We will order for the CT angiogram.    Total time spent with patient was 30 min of which greater than 50% was in counseling.      ADDENDUM:  July 3, 2019 CT angiogram of abdomen pelvis with bilateral lower extremity runoff was reviewed.  This revealed intact descending branches of the lateral circumflex femoral arteries bilaterally.  There is a septal cutaneous  on the right anterolateral thigh that measures 30 cm inferior from the ASIS.  There is a musculocutaneous  on the left anterolateral thigh that measures 26 cm inferior from the ASIS and a  septic cutaneous  on the left thigh measuring 32 cm inferior from the ASIS.  [place] of bilateral ALT flaps is possible.      Answers for HPI/ROS submitted by the patient on 6/25/2019   PHQ9 TOTAL SCORE: 4

## 2019-07-03 ENCOUNTER — ANCILLARY PROCEDURE (OUTPATIENT)
Dept: CT IMAGING | Facility: CLINIC | Age: 69
End: 2019-07-03
Attending: PLASTIC SURGERY
Payer: MEDICARE

## 2019-07-03 DIAGNOSIS — C44.99 PAGET DISEASE, EXTRA MAMMARY: ICD-10-CM

## 2019-07-03 LAB
CREAT BLD-MCNC: 0.8 MG/DL (ref 0.66–1.25)
GFR SERPL CREATININE-BSD FRML MDRD: >90 ML/MIN/{1.73_M2}

## 2019-07-03 RX ORDER — IOPAMIDOL 755 MG/ML
135 INJECTION, SOLUTION INTRAVASCULAR ONCE
Status: COMPLETED | OUTPATIENT
Start: 2019-07-03 | End: 2019-07-03

## 2019-07-03 RX ADMIN — IOPAMIDOL 135 ML: 755 INJECTION, SOLUTION INTRAVASCULAR at 14:10

## 2019-07-03 NOTE — DISCHARGE INSTRUCTIONS

## 2019-07-25 ENCOUNTER — HOSPITAL ENCOUNTER (INPATIENT)
Facility: CLINIC | Age: 69
Setting detail: SURGERY ADMIT
End: 2019-07-25
Attending: PLASTIC SURGERY | Admitting: PLASTIC SURGERY
Payer: MEDICARE

## 2019-07-25 ENCOUNTER — TELEPHONE (OUTPATIENT)
Dept: UROLOGY | Facility: CLINIC | Age: 69
End: 2019-07-25

## 2019-07-25 DIAGNOSIS — C63.2 EXTRAMAMMARY PAGET'S DISEASE OF SCROTUM (H): Primary | ICD-10-CM

## 2019-07-25 RX ORDER — CEFAZOLIN SODIUM 2 G/50ML
2 SOLUTION INTRAVENOUS
Status: CANCELLED | OUTPATIENT
Start: 2019-07-25

## 2019-07-25 RX ORDER — CEFAZOLIN SODIUM 1 G/50ML
1 INJECTION, SOLUTION INTRAVENOUS SEE ADMIN INSTRUCTIONS
Status: CANCELLED | OUTPATIENT
Start: 2019-07-25

## 2019-07-25 NOTE — TELEPHONE ENCOUNTER
Called to inform patient of surgery canceled on 8/9/19 @ [facility] OR with Dr Gtz and rescheduled to 8/27/19 @ [facility] OR with Dr Gtz to be combined with Dr Myles on 8/29/19 @ [facility] OR.  Patient is aware of the dates.

## 2019-08-23 RX ORDER — CHLORAL HYDRATE 500 MG
2 CAPSULE ORAL DAILY
COMMUNITY

## 2019-08-26 ENCOUNTER — ANESTHESIA EVENT (OUTPATIENT)
Dept: SURGERY | Facility: CLINIC | Age: 69
DRG: 578 | End: 2019-08-26
Payer: MEDICARE

## 2019-08-27 ENCOUNTER — HOSPITAL ENCOUNTER (INPATIENT)
Facility: CLINIC | Age: 69
LOS: 5 days | Discharge: HOME HEALTH | DRG: 578 | End: 2019-09-01
Attending: UROLOGY | Admitting: UROLOGY
Payer: MEDICARE

## 2019-08-27 ENCOUNTER — ANESTHESIA (OUTPATIENT)
Dept: SURGERY | Facility: CLINIC | Age: 69
DRG: 578 | End: 2019-08-27
Payer: MEDICARE

## 2019-08-27 DIAGNOSIS — R55 SYNCOPE, VASOVAGAL: ICD-10-CM

## 2019-08-27 DIAGNOSIS — K59.01 SLOW TRANSIT CONSTIPATION: ICD-10-CM

## 2019-08-27 DIAGNOSIS — C44.99 PAGET DISEASE, EXTRA MAMMARY: Primary | ICD-10-CM

## 2019-08-27 DIAGNOSIS — C44.99 PAGETS DISEASE, EXTRA-MAMMARY: ICD-10-CM

## 2019-08-27 DIAGNOSIS — G89.18 POST-OP PAIN: ICD-10-CM

## 2019-08-27 LAB
CREAT SERPL-MCNC: 0.63 MG/DL (ref 0.66–1.25)
GFR SERPL CREATININE-BSD FRML MDRD: >90 ML/MIN/{1.73_M2}
GLUCOSE BLDC GLUCOMTR-MCNC: 105 MG/DL (ref 70–99)
GLUCOSE BLDC GLUCOMTR-MCNC: 99 MG/DL (ref 70–99)
HGB BLD-MCNC: 13.7 G/DL (ref 13.3–17.7)
POTASSIUM SERPL-SCNC: 4.1 MMOL/L (ref 3.4–5.3)

## 2019-08-27 PROCEDURE — 25000125 ZZHC RX 250: Performed by: NURSE ANESTHETIST, CERTIFIED REGISTERED

## 2019-08-27 PROCEDURE — 25000132 ZZH RX MED GY IP 250 OP 250 PS 637: Mod: GY | Performed by: STUDENT IN AN ORGANIZED HEALTH CARE EDUCATION/TRAINING PROGRAM

## 2019-08-27 PROCEDURE — 82565 ASSAY OF CREATININE: CPT | Performed by: ANESTHESIOLOGY

## 2019-08-27 PROCEDURE — 36000053 ZZH SURGERY LEVEL 2 EA 15 ADDTL MIN - UMMC: Performed by: UROLOGY

## 2019-08-27 PROCEDURE — 40000170 ZZH STATISTIC PRE-PROCEDURE ASSESSMENT II: Performed by: UROLOGY

## 2019-08-27 PROCEDURE — 85018 HEMOGLOBIN: CPT | Performed by: ANESTHESIOLOGY

## 2019-08-27 PROCEDURE — 84132 ASSAY OF SERUM POTASSIUM: CPT | Performed by: ANESTHESIOLOGY

## 2019-08-27 PROCEDURE — 25800030 ZZH RX IP 258 OP 636: Performed by: STUDENT IN AN ORGANIZED HEALTH CARE EDUCATION/TRAINING PROGRAM

## 2019-08-27 PROCEDURE — 25000128 H RX IP 250 OP 636: Performed by: UROLOGY

## 2019-08-27 PROCEDURE — 25000132 ZZH RX MED GY IP 250 OP 250 PS 637: Mod: GY | Performed by: ANESTHESIOLOGY

## 2019-08-27 PROCEDURE — 37000008 ZZH ANESTHESIA TECHNICAL FEE, 1ST 30 MIN: Performed by: UROLOGY

## 2019-08-27 PROCEDURE — 37000009 ZZH ANESTHESIA TECHNICAL FEE, EACH ADDTL 15 MIN: Performed by: UROLOGY

## 2019-08-27 PROCEDURE — 88307 TISSUE EXAM BY PATHOLOGIST: CPT | Performed by: UROLOGY

## 2019-08-27 PROCEDURE — 71000014 ZZH RECOVERY PHASE 1 LEVEL 2 FIRST HR: Performed by: UROLOGY

## 2019-08-27 PROCEDURE — 00000146 ZZHCL STATISTIC GLUCOSE BY METER IP

## 2019-08-27 PROCEDURE — 36000051 ZZH SURGERY LEVEL 2 1ST 30 MIN - UMMC: Performed by: UROLOGY

## 2019-08-27 PROCEDURE — 0HB9XZZ EXCISION OF PERINEUM SKIN, EXTERNAL APPROACH: ICD-10-PCS | Performed by: UROLOGY

## 2019-08-27 PROCEDURE — 12000001 ZZH R&B MED SURG/OB UMMC

## 2019-08-27 PROCEDURE — 25000128 H RX IP 250 OP 636: Performed by: NURSE ANESTHETIST, CERTIFIED REGISTERED

## 2019-08-27 PROCEDURE — 25000566 ZZH SEVOFLURANE, EA 15 MIN: Performed by: UROLOGY

## 2019-08-27 PROCEDURE — 27210794 ZZH OR GENERAL SUPPLY STERILE: Performed by: UROLOGY

## 2019-08-27 PROCEDURE — 25800030 ZZH RX IP 258 OP 636: Performed by: NURSE ANESTHETIST, CERTIFIED REGISTERED

## 2019-08-27 RX ORDER — CEFAZOLIN SODIUM 1 G/3ML
1 INJECTION, POWDER, FOR SOLUTION INTRAMUSCULAR; INTRAVENOUS SEE ADMIN INSTRUCTIONS
Status: DISCONTINUED | OUTPATIENT
Start: 2019-08-27 | End: 2019-08-27 | Stop reason: HOSPADM

## 2019-08-27 RX ORDER — SODIUM CHLORIDE, SODIUM LACTATE, POTASSIUM CHLORIDE, CALCIUM CHLORIDE 600; 310; 30; 20 MG/100ML; MG/100ML; MG/100ML; MG/100ML
INJECTION, SOLUTION INTRAVENOUS CONTINUOUS
Status: DISCONTINUED | OUTPATIENT
Start: 2019-08-27 | End: 2019-08-27 | Stop reason: HOSPADM

## 2019-08-27 RX ORDER — CEFAZOLIN SODIUM 1 G/50ML
2 SOLUTION INTRAVENOUS
Status: COMPLETED | OUTPATIENT
Start: 2019-08-27 | End: 2019-08-27

## 2019-08-27 RX ORDER — ONDANSETRON 2 MG/ML
4 INJECTION INTRAMUSCULAR; INTRAVENOUS EVERY 30 MIN PRN
Status: DISCONTINUED | OUTPATIENT
Start: 2019-08-27 | End: 2019-08-27 | Stop reason: HOSPADM

## 2019-08-27 RX ORDER — FENTANYL CITRATE 50 UG/ML
25-50 INJECTION, SOLUTION INTRAMUSCULAR; INTRAVENOUS
Status: DISCONTINUED | OUTPATIENT
Start: 2019-08-27 | End: 2019-08-27 | Stop reason: HOSPADM

## 2019-08-27 RX ORDER — PROCHLORPERAZINE MALEATE 5 MG
5 TABLET ORAL EVERY 6 HOURS PRN
Status: DISCONTINUED | OUTPATIENT
Start: 2019-08-27 | End: 2019-09-01 | Stop reason: HOSPADM

## 2019-08-27 RX ORDER — ONDANSETRON 4 MG/1
4 TABLET, ORALLY DISINTEGRATING ORAL EVERY 6 HOURS PRN
Status: DISCONTINUED | OUTPATIENT
Start: 2019-08-27 | End: 2019-09-01 | Stop reason: HOSPADM

## 2019-08-27 RX ORDER — ONDANSETRON 2 MG/ML
4 INJECTION INTRAMUSCULAR; INTRAVENOUS EVERY 6 HOURS PRN
Status: DISCONTINUED | OUTPATIENT
Start: 2019-08-27 | End: 2019-09-01 | Stop reason: HOSPADM

## 2019-08-27 RX ORDER — FENTANYL CITRATE 50 UG/ML
INJECTION, SOLUTION INTRAMUSCULAR; INTRAVENOUS PRN
Status: DISCONTINUED | OUTPATIENT
Start: 2019-08-27 | End: 2019-08-27

## 2019-08-27 RX ORDER — ACETAMINOPHEN 325 MG/1
650 TABLET ORAL EVERY 4 HOURS PRN
Status: DISCONTINUED | OUTPATIENT
Start: 2019-08-30 | End: 2019-09-01 | Stop reason: HOSPADM

## 2019-08-27 RX ORDER — OXYCODONE HYDROCHLORIDE 5 MG/1
5 TABLET ORAL EVERY 4 HOURS PRN
Status: DISCONTINUED | OUTPATIENT
Start: 2019-08-27 | End: 2019-08-27

## 2019-08-27 RX ORDER — CEFAZOLIN SODIUM 1 G/50ML
2 SOLUTION INTRAVENOUS
Status: DISCONTINUED | OUTPATIENT
Start: 2019-08-27 | End: 2019-08-27 | Stop reason: CLARIF

## 2019-08-27 RX ORDER — ACETAMINOPHEN 325 MG/1
975 TABLET ORAL ONCE
Status: COMPLETED | OUTPATIENT
Start: 2019-08-27 | End: 2019-08-27

## 2019-08-27 RX ORDER — PROPOFOL 10 MG/ML
INJECTION, EMULSION INTRAVENOUS PRN
Status: DISCONTINUED | OUTPATIENT
Start: 2019-08-27 | End: 2019-08-27

## 2019-08-27 RX ORDER — LIDOCAINE HYDROCHLORIDE 20 MG/ML
INJECTION, SOLUTION INFILTRATION; PERINEURAL PRN
Status: DISCONTINUED | OUTPATIENT
Start: 2019-08-27 | End: 2019-08-27

## 2019-08-27 RX ORDER — NALOXONE HYDROCHLORIDE 0.4 MG/ML
.1-.4 INJECTION, SOLUTION INTRAMUSCULAR; INTRAVENOUS; SUBCUTANEOUS
Status: DISCONTINUED | OUTPATIENT
Start: 2019-08-27 | End: 2019-08-27 | Stop reason: HOSPADM

## 2019-08-27 RX ORDER — ONDANSETRON 4 MG/1
4 TABLET, ORALLY DISINTEGRATING ORAL EVERY 30 MIN PRN
Status: DISCONTINUED | OUTPATIENT
Start: 2019-08-27 | End: 2019-08-27 | Stop reason: HOSPADM

## 2019-08-27 RX ORDER — LIDOCAINE 40 MG/G
CREAM TOPICAL
Status: DISCONTINUED | OUTPATIENT
Start: 2019-08-27 | End: 2019-09-01 | Stop reason: HOSPADM

## 2019-08-27 RX ORDER — NALOXONE HYDROCHLORIDE 0.4 MG/ML
.1-.4 INJECTION, SOLUTION INTRAMUSCULAR; INTRAVENOUS; SUBCUTANEOUS
Status: DISCONTINUED | OUTPATIENT
Start: 2019-08-27 | End: 2019-09-01 | Stop reason: HOSPADM

## 2019-08-27 RX ORDER — ONDANSETRON 2 MG/ML
INJECTION INTRAMUSCULAR; INTRAVENOUS PRN
Status: DISCONTINUED | OUTPATIENT
Start: 2019-08-27 | End: 2019-08-27

## 2019-08-27 RX ORDER — SODIUM CHLORIDE 9 MG/ML
INJECTION, SOLUTION INTRAVENOUS CONTINUOUS
Status: DISCONTINUED | OUTPATIENT
Start: 2019-08-27 | End: 2019-08-30

## 2019-08-27 RX ORDER — DEXAMETHASONE SODIUM PHOSPHATE 4 MG/ML
INJECTION, SOLUTION INTRA-ARTICULAR; INTRALESIONAL; INTRAMUSCULAR; INTRAVENOUS; SOFT TISSUE PRN
Status: DISCONTINUED | OUTPATIENT
Start: 2019-08-27 | End: 2019-08-27

## 2019-08-27 RX ORDER — HYDROMORPHONE HYDROCHLORIDE 1 MG/ML
.3-.5 INJECTION, SOLUTION INTRAMUSCULAR; INTRAVENOUS; SUBCUTANEOUS EVERY 10 MIN PRN
Status: DISCONTINUED | OUTPATIENT
Start: 2019-08-27 | End: 2019-08-27 | Stop reason: HOSPADM

## 2019-08-27 RX ORDER — MEPERIDINE HYDROCHLORIDE 25 MG/ML
12.5 INJECTION INTRAMUSCULAR; INTRAVENOUS; SUBCUTANEOUS
Status: DISCONTINUED | OUTPATIENT
Start: 2019-08-27 | End: 2019-08-27 | Stop reason: HOSPADM

## 2019-08-27 RX ORDER — ACETAMINOPHEN 325 MG/1
975 TABLET ORAL EVERY 8 HOURS
Status: DISPENSED | OUTPATIENT
Start: 2019-08-27 | End: 2019-08-30

## 2019-08-27 RX ORDER — OXYCODONE HYDROCHLORIDE 5 MG/1
5-10 TABLET ORAL EVERY 4 HOURS PRN
Status: DISCONTINUED | OUTPATIENT
Start: 2019-08-27 | End: 2019-09-01 | Stop reason: HOSPADM

## 2019-08-27 RX ORDER — CEFAZOLIN SODIUM 1 G/3ML
1 INJECTION, POWDER, FOR SOLUTION INTRAMUSCULAR; INTRAVENOUS SEE ADMIN INSTRUCTIONS
Status: DISCONTINUED | OUTPATIENT
Start: 2019-08-27 | End: 2019-08-27 | Stop reason: CLARIF

## 2019-08-27 RX ORDER — LIDOCAINE 40 MG/G
CREAM TOPICAL
Status: DISCONTINUED | OUTPATIENT
Start: 2019-08-27 | End: 2019-08-27 | Stop reason: HOSPADM

## 2019-08-27 RX ORDER — HYDROMORPHONE HYDROCHLORIDE 1 MG/ML
.3-.5 INJECTION, SOLUTION INTRAMUSCULAR; INTRAVENOUS; SUBCUTANEOUS
Status: DISCONTINUED | OUTPATIENT
Start: 2019-08-27 | End: 2019-09-01 | Stop reason: HOSPADM

## 2019-08-27 RX ORDER — SODIUM CHLORIDE, SODIUM LACTATE, POTASSIUM CHLORIDE, CALCIUM CHLORIDE 600; 310; 30; 20 MG/100ML; MG/100ML; MG/100ML; MG/100ML
INJECTION, SOLUTION INTRAVENOUS CONTINUOUS PRN
Status: DISCONTINUED | OUTPATIENT
Start: 2019-08-27 | End: 2019-08-27

## 2019-08-27 RX ORDER — PRAVASTATIN SODIUM 20 MG
20 TABLET ORAL AT BEDTIME
Status: DISCONTINUED | OUTPATIENT
Start: 2019-08-27 | End: 2019-09-01 | Stop reason: HOSPADM

## 2019-08-27 RX ADMIN — OXYCODONE HYDROCHLORIDE 5 MG: 5 TABLET ORAL at 19:32

## 2019-08-27 RX ADMIN — LIDOCAINE HYDROCHLORIDE 80 MG: 20 INJECTION, SOLUTION INFILTRATION; PERINEURAL at 09:26

## 2019-08-27 RX ADMIN — SODIUM CHLORIDE: 9 INJECTION, SOLUTION INTRAVENOUS at 21:35

## 2019-08-27 RX ADMIN — FENTANYL CITRATE 50 MCG: 50 INJECTION, SOLUTION INTRAMUSCULAR; INTRAVENOUS at 09:52

## 2019-08-27 RX ADMIN — SODIUM CHLORIDE, POTASSIUM CHLORIDE, SODIUM LACTATE AND CALCIUM CHLORIDE: 600; 310; 30; 20 INJECTION, SOLUTION INTRAVENOUS at 09:13

## 2019-08-27 RX ADMIN — PROPOFOL 100 MG: 10 INJECTION, EMULSION INTRAVENOUS at 09:26

## 2019-08-27 RX ADMIN — SUGAMMADEX 150 MG: 100 INJECTION, SOLUTION INTRAVENOUS at 10:32

## 2019-08-27 RX ADMIN — PRAVASTATIN SODIUM 20 MG: 20 TABLET ORAL at 21:36

## 2019-08-27 RX ADMIN — ACETAMINOPHEN 975 MG: 325 TABLET, FILM COATED ORAL at 20:45

## 2019-08-27 RX ADMIN — SODIUM CHLORIDE: 9 INJECTION, SOLUTION INTRAVENOUS at 11:31

## 2019-08-27 RX ADMIN — DEXAMETHASONE SODIUM PHOSPHATE 4 MG: 4 INJECTION, SOLUTION INTRA-ARTICULAR; INTRALESIONAL; INTRAMUSCULAR; INTRAVENOUS; SOFT TISSUE at 09:27

## 2019-08-27 RX ADMIN — ROCURONIUM BROMIDE 40 MG: 10 INJECTION INTRAVENOUS at 09:26

## 2019-08-27 RX ADMIN — Medication 1 MG: at 22:25

## 2019-08-27 RX ADMIN — FENTANYL CITRATE 50 MCG: 50 INJECTION, SOLUTION INTRAMUSCULAR; INTRAVENOUS at 09:15

## 2019-08-27 RX ADMIN — CEFAZOLIN 2 G: 10 INJECTION, POWDER, FOR SOLUTION INTRAVENOUS at 09:35

## 2019-08-27 RX ADMIN — ACETAMINOPHEN 975 MG: 325 TABLET, FILM COATED ORAL at 14:29

## 2019-08-27 RX ADMIN — ONDANSETRON 4 MG: 2 INJECTION INTRAMUSCULAR; INTRAVENOUS at 09:28

## 2019-08-27 RX ADMIN — FENTANYL CITRATE 50 MCG: 50 INJECTION, SOLUTION INTRAMUSCULAR; INTRAVENOUS at 10:11

## 2019-08-27 RX ADMIN — ACETAMINOPHEN 975 MG: 325 TABLET, FILM COATED ORAL at 08:34

## 2019-08-27 ASSESSMENT — ACTIVITIES OF DAILY LIVING (ADL)
ADLS_ACUITY_SCORE: 13
ADLS_ACUITY_SCORE: 13

## 2019-08-27 ASSESSMENT — MIFFLIN-ST. JEOR: SCORE: 1410.63

## 2019-08-27 ASSESSMENT — LIFESTYLE VARIABLES: TOBACCO_USE: 1

## 2019-08-27 ASSESSMENT — PAIN DESCRIPTION - DESCRIPTORS: DESCRIPTORS: TENDER;SORE

## 2019-08-27 NOTE — PLAN OF CARE
[id number]: Afebrile. VSS on RA. A/Ox4. Up with A1 + walker. Receiving scheduled tylenol for incisional pain in scrotum; declined PRN pain oxycodone. Up to ambulate in halls x1 with nursing staff. Regular diet with fair appetite. Last BM 8/27. Voiding in bedside urinal. Scrotum with +4 edema and packed incision. Packing intact with moderate amounts of serosanguinous drainage. NS running continuously at 100mL/hr. Capnography in place. Wife is at bedside and is involved with POC. Continue to monitor.    Ernestina Bee RN on 8/27/2019 at 6:28 PM

## 2019-08-27 NOTE — OP NOTE
PREOPERATIVE DIAGNOSIS: Extramammary Paget's Disease     POSTOPERATIVE DIAGNOSIS: Extramammary Paget's Disease      PROCEDURES PERFORMED: Wide local excision of perineal lesion    SURGEON: Fracisco Gtz MD    FELLOW: María Terry MD    RESIDENT: Kvng Dexter MD  ANESTHESIA: General    ESTIMATED BLOOD LOSS: 5 mL.     TUBES AND DRAINS: None  FINDINGS: Excised perineal skin to the margins of the prior mapping biopsies and to the posterior aspect of the scrotal skin graft. There was no obvious disease visible.    BRIEF HISTORY OF PRESENT ILLNESS: Sweetie Carrero is a 69 year old-year-old male with history of extramammary Paget's Disease of the scrotum and perineum. He has undergone several resections most recently in Feb 2019 when he underwent scrotectomy. The posterior margin was ultimately positive despite negative frozen section intraoperatively. He is here for repeat resection. The risks and benefits were discussed including but not limited to bleeding, infection, pain/numbness, need for open wound with dressing changes for a few days until pathology is finalized, and need for additional procedures. He agreed to proceed.    DESCRIPTION OF PROCEDURE: After informed consent was obtained and pre-operative antibiotics were given in the form of ancef, the patient was brought to the operating room. Under general anesthesia he was placed in the low lithotomy position with all pressure points well-padded. The penis, scrotum and perineum were prepped and draped in the standard sterile fashion.   The right, left, and posterior mapping biopsy sites were visible on the perineum. In review of all biopsies and specimens in the past, we knew the sites of the mapping biopsies were all negative on the perineum. We also knew that the lateral and anterior margins from his prior perineal resection were positive but the posterior margin was negative. Lastly, the posterior margin from his scrotal resection was positive. Based on  "these margins we planned to excise a triangular section of tissue from the posterior extent of the scrotal skin graft out laterally to the right and left perineal biopsy sites thus including his prior midline scar, and then meeting in the midline near the posterior perineal biopsy site. This would thus include all prior positive margins and extend to sites of known negative biopsies.   We used a 15 blade to sharply come through the skin of our resection margin and this was carried through the epidermis down to the deep dermis. Electrocautery was used to dissect through the deep dermis underneath the specimen until freed from all attachments. Hemostasis was achieved. The left lateral margin of the specimen was marked with long silk suture and the the anterior margin of the specimen was marked with short silk suture. Fluffs and a scrotal support were applied. He was woken from anesthesia and transferred to the recovery room in stable condition.     Kenya Terry MD  Reconstructive Urology Fellow    5' 7\"  153 lbs 10.57 oz  Body mass index is 24.07 kg/m .    "

## 2019-08-27 NOTE — BRIEF OP NOTE
[facility], [place]    Brief Operative Note    Pre-operative diagnosis: Paget's Disease  Post-operative diagnosis * No post-op diagnosis entered *  Procedure: Procedure(s):  Wide Local Excision of Perineal Skin Lesion  Surgeon: Surgeon(s) and Role:     * rFacisco Gtz MD - Primary     * María Terry MD - Assisting     * Kvng Dexter MD - Resident - Assisting  Anesthesia: General   Estimated blood loss: Less than 10 ml  Drains: None  Specimens:   ID Type Source Tests Collected by Time Destination   A : Perineal Lesion (Long - Left Lateral, Short - Anterior) Tissue Other SURGICAL PATHOLOGY EXAM Fracisco Gtz MD 8/27/2019 10:11 AM      Findings:   See operative Report.  Complications: None.  Implants:  * No implants in log *

## 2019-08-27 NOTE — PLAN OF CARE
"/55 (BP Location: Right arm)   Pulse 71   Temp 97.3  F (36.3  C) (Oral)   Resp 20   Ht 1.702 m (5' 7\")   Wt 69.7 kg (153 lb 10.6 oz)   SpO2 96%   BMI 24.07 kg/m      Pt arrived to unit around 1300. Settled and made comfortable. Oriented to unit and room. Reg diet; ordered lunch with no complaints of N/V. L PIV NS @ 100/hr. Voided x1. Scrotal incision packed - to be assessed and removed by MD Thursday or Friday. Continue with POC  "

## 2019-08-27 NOTE — ANESTHESIA PREPROCEDURE EVALUATION
Anesthesia Pre-Procedure Evaluation    Patient: Sweetie Carrero   MRN:     [id number] Gender:   male   Age:    69 year old :      1950        Preoperative Diagnosis: Paget's Disease   Procedure(s):  Resection of Perineal Skin Lesion     Past Medical History:   Diagnosis Date     Diabetes (H)     controlled by diet      Hypertension      Kidney stones      Paget's disease of skin of scrotum (H)       Past Surgical History:   Procedure Laterality Date     COLONOSCOPY N/A 2019    Procedure: Colonoscopy;  Surgeon: Von Owen MD;  [facility]     EXAM UNDER ANESTHESIA ANUS N/A 2019    Procedure: Examination Under Anesthesia with Perianal Mapping and Biopsies;  Surgeon: Von Owen MD;  [facility]     EXPLORE SCROTUM N/A 2018    Procedure: Wide Local Excision of Scrotal Lesions;  Surgeon: Fracisco Gtz MD;  [facility]     GRAFT SKIN SPLIT THICKNESS FROM EXTREMITY Left 2019    Procedure: Split Thickness Skin Graft From Left Thigh;  Surgeon: Fracisco Gtz MD;  [facility]     SCROTOPLASTY N/A 2019    Procedure: Removal Of Scrotal Skin;  Surgeon: Fracisco Gtz MD;  [facility]: [facility] OR          Anesthesia Evaluation     .             ROS/MED HX    ENT/Pulmonary:     (+)tobacco use, Past use , . .    Neurologic:       Cardiovascular:     (+) Dyslipidemia, hypertension----. : . . . :. .       METS/Exercise Tolerance:     Hematologic:         Musculoskeletal:         GI/Hepatic:         Renal/Genitourinary:     (+) Nephrolithiasis ,       Endo:     (+) type II DM .      Psychiatric:         Infectious Disease:         Malignancy:   (+) Malignancy   Extramammar pagets disease of scrotum        Other:                         PHYSICAL EXAM:   Mental Status/Neuro: A/A/O   Airway: Facies: Feasible  Mallampati: II  Mouth/Opening: Full  TM distance: < 6 cm  Neck ROM: Full   Respiratory:   Resp. Rate: Normal     Resp. Effort: Normal      CV:    Comments: Poor  "general dentition     Dental: Details                LABS:  CBC:   Lab Results   Component Value Date    WBC 7.2 02/22/2019    HGB 13.7 08/27/2019    HGB 14.0 02/22/2019    HCT 44.4 02/22/2019     02/22/2019     BMP:   Lab Results   Component Value Date     02/22/2019    POTASSIUM 4.1 08/27/2019    POTASSIUM 3.7 02/22/2019    CHLORIDE 108 02/22/2019    CO2 26 02/22/2019    BUN 7 02/22/2019    CR 0.63 (L) 08/27/2019    CR 0.69 02/22/2019     (H) 02/22/2019     COAGS: No results found for: PTT, INR, FIBR  POC:   Lab Results   Component Value Date     (H) 08/27/2019     OTHER:   Lab Results   Component Value Date    NICK 8.2 (L) 02/22/2019        Preop Vitals    BP Readings from Last 3 Encounters:   08/27/19 (!) 147/76   05/24/19 112/70   04/02/19 142/76    Pulse Readings from Last 3 Encounters:   08/27/19 70   04/02/19 81   03/17/19 80      Resp Readings from Last 3 Encounters:   08/27/19 16   05/24/19 16   03/17/19 18    SpO2 Readings from Last 3 Encounters:   08/27/19 100%   05/24/19 98%   04/02/19 98%      Temp Readings from Last 1 Encounters:   08/27/19 36.8  C (98.2  F) (Oral)    Ht Readings from Last 1 Encounters:   08/27/19 1.702 m (5' 7\")      Wt Readings from Last 1 Encounters:   08/27/19 69.7 kg (153 lb 10.6 oz)    Estimated body mass index is 24.07 kg/m  as calculated from the following:    Height as of this encounter: 1.702 m (5' 7\").    Weight as of this encounter: 69.7 kg (153 lb 10.6 oz).     LDA:  Peripheral IV 02/21/19 Left Lower forearm (Active)   Number of days: 187       Peripheral IV 08/27/19 Left Hand (Active)   Site Assessment WDL 8/27/2019  8:42 AM   Line Status Saline locked 8/27/2019  8:42 AM   Phlebitis Scale 0-->no symptoms 8/27/2019  8:42 AM   Number of days: 0       ETT (adult) 7.5 (Active)   Number of days: 0       Urethral Catheter Double-lumen;Non-latex;Straight-tip 16 fr (Active)   Number of days: 187        Assessment:   ASA SCORE: 2    H&P: History and " physical reviewed and following examination; no interval change.         Plan:   Anes. Type:  General   Pre-Medication: None   Induction:  IV (Standard)   Airway: ETT; Oral; CMAC/VL   Access/Monitoring: PIV   Maintenance: Balanced     Postop Plan:   Postop Pain: Opioids  Postop Sedation/Airway: Not planned     PONV Management:   Adult Risk Factors:, Postop Opioids   Prevention: Ondansetron     CONSENT: Direct conversation   Plan and risks discussed with: Patient   Blood Products: Consent Deferred (Minimal Blood Loss)                   Jorden Carr MD

## 2019-08-27 NOTE — ANESTHESIA POSTPROCEDURE EVALUATION
Anesthesia POST Procedure Evaluation    Patient: Sweetie Carrero   MRN:     [id number] Gender:   male   Age:    69 year old :      1950        Preoperative Diagnosis: Paget's Disease   Procedure(s):  Wide Local Excision of Perineal Skin Lesion   Postop Comments: No value filed.       Anesthesia Type:  Not documented  No value filed.    Reportable Event: NO     PAIN: Uncomplicated   Sign Out status: Comfortable, Well controlled pain     PONV: No PONV   Sign Out status:  No Nausea or Vomiting     Neuro/Psych: Uneventful perioperative course   Sign Out Status: Preoperative baseline; Age appropriate mentation     Airway/Resp.: Uneventful perioperative course   Sign Out Status: Non labored breathing, age appropriate RR; Resp. Status within EXPECTED Parameters     CV: Uneventful perioperative course   Sign Out status: Appropriate BP and perfusion indices; Appropriate HR/Rhythm     Disposition:   Sign Out in:  PACU  Disposition:  Phase II; Home  Recovery Course: Uneventful  Follow-Up: Not required           Last Anesthesia Record Vitals:  CRNA VITALS  2019 1014 - 2019 1114      2019             Pulse:  90    SpO2:  90 %    Resp Rate (observed):  5  (Abnormal)           Last PACU Vitals:  Vitals Value Taken Time   /76 2019 11:30 AM   Temp 36.7  C (98  F) 2019 10:50 AM   Pulse 71 2019 11:30 AM   Resp 17 2019 11:30 AM   SpO2 100 % 2019 11:32 AM   Temp src     NIBP     Pulse     SpO2     Resp     Temp     Ht Rate     Temp 2     Vitals shown include unvalidated device data.      Electronically Signed By: Jorden Carr MD, 2019, 11:33 AM

## 2019-08-27 NOTE — ANESTHESIA CARE TRANSFER NOTE
Patient: Sweetie Carrero    Procedure(s):  Wide Local Excision of Perineal Skin Lesion    Diagnosis: Paget's Disease  Diagnosis Additional Information: No value filed.    Anesthesia Type:   No value filed.     Note:  Airway :Face Mask  Patient transferred to:PACU  Comments: Anesthesia Care Transfer Note      Transfer to:  PACU    Patient Vital Signs:  Stable    Airway:  None    Patient transported to PACU with supplemental O2.  Patient alert and breathing comfortably.  VSS.  Care transferred with report to PACU RN.    Matt Avelar CRNAHandoff Report: Identifed the Patient, Identified the Reponsible Provider, Reviewed the pertinent medical history, Discussed the surgical course, Reviewed Intra-OP anesthesia mangement and issues during anesthesia, Set expectations for post-procedure period and Allowed opportunity for questions and acknowledgement of understanding      Vitals: (Last set prior to Anesthesia Care Transfer)    CRNA VITALS  8/27/2019 1014 - 8/27/2019 1049      8/27/2019             Pulse:  90    SpO2:  90 %    Resp Rate (observed):  5  (Abnormal)                 Electronically Signed By: KERRY Wilson CRNA  August 27, 2019  10:49 AM

## 2019-08-28 ENCOUNTER — ANESTHESIA EVENT (OUTPATIENT)
Dept: SURGERY | Facility: CLINIC | Age: 69
DRG: 578 | End: 2019-08-28
Payer: MEDICARE

## 2019-08-28 LAB
ANION GAP SERPL CALCULATED.3IONS-SCNC: 6 MMOL/L (ref 3–14)
BUN SERPL-MCNC: 13 MG/DL (ref 7–30)
CALCIUM SERPL-MCNC: 8 MG/DL (ref 8.5–10.1)
CHLORIDE SERPL-SCNC: 110 MMOL/L (ref 94–109)
CO2 SERPL-SCNC: 26 MMOL/L (ref 20–32)
COPATH REPORT: NORMAL
CREAT SERPL-MCNC: 0.59 MG/DL (ref 0.66–1.25)
ERYTHROCYTE [DISTWIDTH] IN BLOOD BY AUTOMATED COUNT: 12.9 % (ref 10–15)
GFR SERPL CREATININE-BSD FRML MDRD: >90 ML/MIN/{1.73_M2}
GLUCOSE SERPL-MCNC: 113 MG/DL (ref 70–99)
HCT VFR BLD AUTO: 45.9 % (ref 40–53)
HGB BLD-MCNC: 13.6 G/DL (ref 13.3–17.7)
MCH RBC QN AUTO: 27.3 PG (ref 26.5–33)
MCHC RBC AUTO-ENTMCNC: 29.6 G/DL (ref 31.5–36.5)
MCV RBC AUTO: 92 FL (ref 78–100)
PLATELET # BLD AUTO: 164 10E9/L (ref 150–450)
POTASSIUM SERPL-SCNC: 3.4 MMOL/L (ref 3.4–5.3)
RBC # BLD AUTO: 4.98 10E12/L (ref 4.4–5.9)
SODIUM SERPL-SCNC: 143 MMOL/L (ref 133–144)
WBC # BLD AUTO: 7.4 10E9/L (ref 4–11)

## 2019-08-28 PROCEDURE — 25800030 ZZH RX IP 258 OP 636: Performed by: STUDENT IN AN ORGANIZED HEALTH CARE EDUCATION/TRAINING PROGRAM

## 2019-08-28 PROCEDURE — 80048 BASIC METABOLIC PNL TOTAL CA: CPT | Performed by: STUDENT IN AN ORGANIZED HEALTH CARE EDUCATION/TRAINING PROGRAM

## 2019-08-28 PROCEDURE — 85027 COMPLETE CBC AUTOMATED: CPT | Performed by: STUDENT IN AN ORGANIZED HEALTH CARE EDUCATION/TRAINING PROGRAM

## 2019-08-28 PROCEDURE — 25000132 ZZH RX MED GY IP 250 OP 250 PS 637: Mod: GY | Performed by: STUDENT IN AN ORGANIZED HEALTH CARE EDUCATION/TRAINING PROGRAM

## 2019-08-28 PROCEDURE — 12000001 ZZH R&B MED SURG/OB UMMC

## 2019-08-28 PROCEDURE — 25000128 H RX IP 250 OP 636: Performed by: STUDENT IN AN ORGANIZED HEALTH CARE EDUCATION/TRAINING PROGRAM

## 2019-08-28 PROCEDURE — 36415 COLL VENOUS BLD VENIPUNCTURE: CPT | Performed by: STUDENT IN AN ORGANIZED HEALTH CARE EDUCATION/TRAINING PROGRAM

## 2019-08-28 RX ORDER — HYDROMORPHONE HYDROCHLORIDE 1 MG/ML
0.3 INJECTION, SOLUTION INTRAMUSCULAR; INTRAVENOUS; SUBCUTANEOUS ONCE
Status: COMPLETED | OUTPATIENT
Start: 2019-08-28 | End: 2019-08-28

## 2019-08-28 RX ADMIN — SODIUM CHLORIDE: 9 INJECTION, SOLUTION INTRAVENOUS at 17:41

## 2019-08-28 RX ADMIN — ACETAMINOPHEN 975 MG: 325 TABLET, FILM COATED ORAL at 21:15

## 2019-08-28 RX ADMIN — Medication 1 MG: at 22:03

## 2019-08-28 RX ADMIN — PRAVASTATIN SODIUM 20 MG: 20 TABLET ORAL at 22:02

## 2019-08-28 RX ADMIN — SODIUM CHLORIDE: 9 INJECTION, SOLUTION INTRAVENOUS at 07:24

## 2019-08-28 RX ADMIN — ACETAMINOPHEN 975 MG: 325 TABLET, FILM COATED ORAL at 13:04

## 2019-08-28 RX ADMIN — ACETAMINOPHEN 975 MG: 325 TABLET, FILM COATED ORAL at 04:38

## 2019-08-28 RX ADMIN — HYDROMORPHONE HYDROCHLORIDE 0.3 MG: 1 INJECTION, SOLUTION INTRAMUSCULAR; INTRAVENOUS; SUBCUTANEOUS at 05:53

## 2019-08-28 ASSESSMENT — ACTIVITIES OF DAILY LIVING (ADL)
ADLS_ACUITY_SCORE: 13

## 2019-08-28 NOTE — PLAN OF CARE
Vitals:    08/27/19 2240 08/28/19 0000 08/28/19 0432 08/28/19 0743   BP: 120/63   123/62   BP Location: Right arm   Right arm   Pulse:   68    Resp: 20 17 15 16   Temp: 97.1  F (36.2  C)  96.1  F (35.6  C) 97.7  F (36.5  C)   TempSrc: Oral   Oral   SpO2: 97% 98% 97%    Weight:       Height:          Status: POD 1 for excision of maxx-rectal lesion (hx paget's disease)  Assist: SBA for ambulation, gait steady   Neuros: A&Ox4  Cardiac: WDL  Respiratory: WDL, capnography removed at 1300  GI/: WDL  Diet: Regular, DM II - diet controlled, AM   Skin/Incisions: scrotal incision, packed with wet gauze, drsg changed by MD this am, small amount of bloody drainage at time of assessment. Scrotal swelling remains although no issues noted with voiding.   Lines/Drains: L PIV   Pain/Nausea: Tolerable incisional pain reported, scheduled tylenol effective. Refusing PRN oxycodone. Denies nausea  New Changes: will be NPO at midnight except meds for procedure tomorrow for possible reconstruction  Plan: continue with POC

## 2019-08-28 NOTE — PROGRESS NOTES
"Urology  Progress Note  08/28/2019    - No acute overnight events  - Pain well-controlled  - Tolerating diet, no nausea or vomiting    Exam  /63 (BP Location: Right arm)   Pulse 68   Temp 96.1  F (35.6  C)   Resp 15   Ht 1.702 m (5' 7\")   Wt 69.7 kg (153 lb 10.6 oz)   SpO2 97%   BMI 24.07 kg/m    No acute distress  Unlabored breathing  Abdomen soft, nontender, nondistended.  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     Neuro: Sched tylenol, PRN IV dilaudid and oxycodone for pain control  CV: HDS.  Pulm: incentive spirometry while awake  FEN/GI: Regular diet, MIVF @ 100/hr, NPO at midnight for possible OR tomorrow pending results of pathology.  Endo: PRISCILA.  : Monitor UOP. Wet to dry dressing change BID. Pathology pending. Scheduled for flap with Plastics (Dr Myles) 8/29.  Heme/ID: Hgb pending. Ronel-op abx complete. Monitor for fevers and leukocytosis.  Activity: Up ad karen  PPx: SCDs    Seen and examined with the chief resident. Will discuss with Dr. Gtz.    Anibal Rios MD  Urology Resident     Contacting the Urology Team     Please use the following job codes to reach the Urology Team. Note that you must use an in house phone and that job codes cannot receive text pages.     On weekdays, dial [phone] (or star-star-star 777 on the new [employer] telephones) then [phone] to reach the Adult Urology resident or PA on call    On weekdays, dial [phone] (or star-star-star 777 on the new [employer] telephones) then 0818 to reach the Pediatric Urology resident    On weeknights and weekends, dial [phone] (or star-star-star 777 on the new [employer] telephones) then [phone] to reach the Urology resident on call (for both Adult and Pediatrics)              "

## 2019-08-28 NOTE — PROGRESS NOTES
"/65 (BP Location: Right arm)   Pulse 71   Temp 97.3  F (36.3  C) (Oral)   Resp 17   Ht 1.702 m (5' 7\")   Wt 69.7 kg (153 lb 10.6 oz)   SpO2 95%   BMI 24.07 kg/m      1713-1762  VSS pt AOx4. Scheduled Tylenol and Oxy x1 for pain. Scrotal incision packed. Pt voiding. Denies nausea. Reg diet. Up SBA. Will continue to monitor and follow POC.  "

## 2019-08-28 NOTE — PROGRESS NOTES
Plastic Surgery Progress Note    Patient understand rationale for surgery. He is hoping to heal as quickly as possible.    Temp:  [96.1  F (35.6  C)-97.7  F (36.5  C)] 97.4  F (36.3  C)  Pulse:  [68] 68  Heart Rate:  [62-97] 62  Resp:  [15-20] 16  BP: (110-124)/(53-65) 110/61  SpO2:  [95 %-98 %] 98 %  I/O last 3 completed shifts:  In: 1898.33 [P.O.:50; I.V.:1848.33]  Out: 3150 [Urine:3150]  Perineal dressing CDI.  Bilateral thighs and groins marked.  Dopperable signal found along both thighs.    8/27/2019 Pathology:  SPECIMEN(S):   Perineal lesion     FINAL DIAGNOSIS:   Skin, perineal lesion, excision:   - Benign skin with prior surgical site changes   - Negative for malignancy     CBC  Recent Labs   Lab 08/28/19  0734 08/27/19  0830   WBC 7.4  --    RBC 4.98  --    HGB 13.6 13.7   HCT 45.9  --    MCV 92  --    MCH 27.3  --    MCHC 29.6*  --    RDW 12.9  --      --      BMP  Recent Labs   Lab 08/28/19  0734 08/27/19  0830     --    POTASSIUM 3.4 4.1   CHLORIDE 110*  --    NICK 8.0*  --    CO2 26  --    BUN 13  --    CR 0.59* 0.63*   *  --        ASSESSMENT: POD 1 s/p perineal Paget's wide local excision.    PLAN: We discussed our surgical options for closure today. Patient and his wife understand the concepts and difference between grafts and flaps. If possible, we will cover the wound with a skin graft. If the wound is deep or there are deeper structures involved, we will elect to co er it with a flap, likely a pedicled left ALT flap. Risks benefits and alternatives were discussed. Patient will consider these risks. Both thighs and groins were marked. He is to go NPO tonight.

## 2019-08-28 NOTE — PLAN OF CARE
"/63 (BP Location: Right arm)   Pulse 68   Temp 96.1  F (35.6  C)   Resp 15   Ht 1.702 m (5' 7\")   Wt 69.7 kg (153 lb 10.6 oz)   SpO2 97%   BMI 24.07 kg/m     Patient with packing in scrotal area, packing with serosanguinous drainage rt>lft side.  Area edematous, packing held in place with mesh pant.   Pain controlled with scheduled tylenol, declined oxycodone.   Voided 700cc with SBA.   Lungs clear diminished.    Will pre medicate with dilaudid for dressing change per MD order.  Continue with POC.  "

## 2019-08-29 ENCOUNTER — ANESTHESIA (OUTPATIENT)
Dept: SURGERY | Facility: CLINIC | Age: 69
DRG: 578 | End: 2019-08-29
Payer: MEDICARE

## 2019-08-29 LAB
ANION GAP SERPL CALCULATED.3IONS-SCNC: 5 MMOL/L (ref 3–14)
BUN SERPL-MCNC: 9 MG/DL (ref 7–30)
CALCIUM SERPL-MCNC: 8.2 MG/DL (ref 8.5–10.1)
CHLORIDE SERPL-SCNC: 112 MMOL/L (ref 94–109)
CO2 SERPL-SCNC: 28 MMOL/L (ref 20–32)
CREAT SERPL-MCNC: 0.59 MG/DL (ref 0.66–1.25)
ERYTHROCYTE [DISTWIDTH] IN BLOOD BY AUTOMATED COUNT: 12.8 % (ref 10–15)
GFR SERPL CREATININE-BSD FRML MDRD: >90 ML/MIN/{1.73_M2}
GLUCOSE BLDC GLUCOMTR-MCNC: 131 MG/DL (ref 70–99)
GLUCOSE BLDC GLUCOMTR-MCNC: 84 MG/DL (ref 70–99)
GLUCOSE SERPL-MCNC: 100 MG/DL (ref 70–99)
HCT VFR BLD AUTO: 41.8 % (ref 40–53)
HGB BLD-MCNC: 13.1 G/DL (ref 13.3–17.7)
MCH RBC QN AUTO: 27.6 PG (ref 26.5–33)
MCHC RBC AUTO-ENTMCNC: 31.3 G/DL (ref 31.5–36.5)
MCV RBC AUTO: 88 FL (ref 78–100)
PLATELET # BLD AUTO: 154 10E9/L (ref 150–450)
POTASSIUM SERPL-SCNC: 3.6 MMOL/L (ref 3.4–5.3)
RBC # BLD AUTO: 4.75 10E12/L (ref 4.4–5.9)
SODIUM SERPL-SCNC: 144 MMOL/L (ref 133–144)
WBC # BLD AUTO: 5.4 10E9/L (ref 4–11)

## 2019-08-29 PROCEDURE — 25800030 ZZH RX IP 258 OP 636: Performed by: NURSE ANESTHETIST, CERTIFIED REGISTERED

## 2019-08-29 PROCEDURE — 25000125 ZZHC RX 250: Performed by: NURSE ANESTHETIST, CERTIFIED REGISTERED

## 2019-08-29 PROCEDURE — 37000009 ZZH ANESTHESIA TECHNICAL FEE, EACH ADDTL 15 MIN: Performed by: PLASTIC SURGERY

## 2019-08-29 PROCEDURE — 25000128 H RX IP 250 OP 636: Performed by: STUDENT IN AN ORGANIZED HEALTH CARE EDUCATION/TRAINING PROGRAM

## 2019-08-29 PROCEDURE — 25000128 H RX IP 250 OP 636: Performed by: NURSE ANESTHETIST, CERTIFIED REGISTERED

## 2019-08-29 PROCEDURE — 85027 COMPLETE CBC AUTOMATED: CPT | Performed by: STUDENT IN AN ORGANIZED HEALTH CARE EDUCATION/TRAINING PROGRAM

## 2019-08-29 PROCEDURE — 71000014 ZZH RECOVERY PHASE 1 LEVEL 2 FIRST HR: Performed by: PLASTIC SURGERY

## 2019-08-29 PROCEDURE — 00000146 ZZHCL STATISTIC GLUCOSE BY METER IP

## 2019-08-29 PROCEDURE — 80048 BASIC METABOLIC PNL TOTAL CA: CPT | Performed by: STUDENT IN AN ORGANIZED HEALTH CARE EDUCATION/TRAINING PROGRAM

## 2019-08-29 PROCEDURE — 36000059 ZZH SURGERY LEVEL 3 EA 15 ADDTL MIN UMMC: Performed by: PLASTIC SURGERY

## 2019-08-29 PROCEDURE — 0JXM0ZC TRANSFER LEFT UPPER LEG SUBCUTANEOUS TISSUE AND FASCIA WITH SKIN, SUBCUTANEOUS TISSUE AND FASCIA, OPEN APPROACH: ICD-10-PCS | Performed by: PLASTIC SURGERY

## 2019-08-29 PROCEDURE — 25800030 ZZH RX IP 258 OP 636: Performed by: STUDENT IN AN ORGANIZED HEALTH CARE EDUCATION/TRAINING PROGRAM

## 2019-08-29 PROCEDURE — 36415 COLL VENOUS BLD VENIPUNCTURE: CPT | Performed by: STUDENT IN AN ORGANIZED HEALTH CARE EDUCATION/TRAINING PROGRAM

## 2019-08-29 PROCEDURE — 25000132 ZZH RX MED GY IP 250 OP 250 PS 637: Mod: GY | Performed by: STUDENT IN AN ORGANIZED HEALTH CARE EDUCATION/TRAINING PROGRAM

## 2019-08-29 PROCEDURE — 25000131 ZZH RX MED GY IP 250 OP 636 PS 637: Mod: GY | Performed by: PLASTIC SURGERY

## 2019-08-29 PROCEDURE — 37000008 ZZH ANESTHESIA TECHNICAL FEE, 1ST 30 MIN: Performed by: PLASTIC SURGERY

## 2019-08-29 PROCEDURE — 25000128 H RX IP 250 OP 636: Performed by: ANESTHESIOLOGY

## 2019-08-29 PROCEDURE — 25000565 ZZH ISOFLURANE, EA 15 MIN: Performed by: PLASTIC SURGERY

## 2019-08-29 PROCEDURE — 12000001 ZZH R&B MED SURG/OB UMMC

## 2019-08-29 PROCEDURE — 36000057 ZZH SURGERY LEVEL 3 1ST 30 MIN - UMMC: Performed by: PLASTIC SURGERY

## 2019-08-29 PROCEDURE — 40000170 ZZH STATISTIC PRE-PROCEDURE ASSESSMENT II: Performed by: PLASTIC SURGERY

## 2019-08-29 PROCEDURE — 27210794 ZZH OR GENERAL SUPPLY STERILE: Performed by: PLASTIC SURGERY

## 2019-08-29 PROCEDURE — 71000015 ZZH RECOVERY PHASE 1 LEVEL 2 EA ADDTL HR: Performed by: PLASTIC SURGERY

## 2019-08-29 RX ORDER — LIDOCAINE HYDROCHLORIDE 20 MG/ML
INJECTION, SOLUTION INFILTRATION; PERINEURAL PRN
Status: DISCONTINUED | OUTPATIENT
Start: 2019-08-29 | End: 2019-08-29

## 2019-08-29 RX ORDER — NALOXONE HYDROCHLORIDE 0.4 MG/ML
.1-.4 INJECTION, SOLUTION INTRAMUSCULAR; INTRAVENOUS; SUBCUTANEOUS
Status: DISCONTINUED | OUTPATIENT
Start: 2019-08-29 | End: 2019-08-29

## 2019-08-29 RX ORDER — FENTANYL CITRATE 50 UG/ML
INJECTION, SOLUTION INTRAMUSCULAR; INTRAVENOUS PRN
Status: DISCONTINUED | OUTPATIENT
Start: 2019-08-29 | End: 2019-08-29

## 2019-08-29 RX ORDER — PROPOFOL 10 MG/ML
INJECTION, EMULSION INTRAVENOUS PRN
Status: DISCONTINUED | OUTPATIENT
Start: 2019-08-29 | End: 2019-08-29

## 2019-08-29 RX ORDER — PAPAVERINE HYDROCHLORIDE 30 MG/ML
INJECTION INTRAMUSCULAR; INTRAVENOUS PRN
Status: DISCONTINUED | OUTPATIENT
Start: 2019-08-29 | End: 2019-08-29 | Stop reason: HOSPADM

## 2019-08-29 RX ORDER — HYDROMORPHONE HYDROCHLORIDE 1 MG/ML
.3-.5 INJECTION, SOLUTION INTRAMUSCULAR; INTRAVENOUS; SUBCUTANEOUS EVERY 5 MIN PRN
Status: DISCONTINUED | OUTPATIENT
Start: 2019-08-29 | End: 2019-08-29 | Stop reason: HOSPADM

## 2019-08-29 RX ORDER — SODIUM CHLORIDE, SODIUM LACTATE, POTASSIUM CHLORIDE, CALCIUM CHLORIDE 600; 310; 30; 20 MG/100ML; MG/100ML; MG/100ML; MG/100ML
INJECTION, SOLUTION INTRAVENOUS CONTINUOUS PRN
Status: DISCONTINUED | OUTPATIENT
Start: 2019-08-29 | End: 2019-08-29

## 2019-08-29 RX ORDER — FENTANYL CITRATE 50 UG/ML
25-50 INJECTION, SOLUTION INTRAMUSCULAR; INTRAVENOUS
Status: DISCONTINUED | OUTPATIENT
Start: 2019-08-29 | End: 2019-08-29 | Stop reason: HOSPADM

## 2019-08-29 RX ORDER — LIDOCAINE 40 MG/G
CREAM TOPICAL
Status: DISCONTINUED | OUTPATIENT
Start: 2019-08-29 | End: 2019-08-29

## 2019-08-29 RX ORDER — CEFAZOLIN SODIUM 1 G/3ML
1 INJECTION, POWDER, FOR SOLUTION INTRAMUSCULAR; INTRAVENOUS EVERY 8 HOURS
Status: COMPLETED | OUTPATIENT
Start: 2019-08-30 | End: 2019-08-30

## 2019-08-29 RX ORDER — ONDANSETRON 2 MG/ML
INJECTION INTRAMUSCULAR; INTRAVENOUS PRN
Status: DISCONTINUED | OUTPATIENT
Start: 2019-08-29 | End: 2019-08-29

## 2019-08-29 RX ORDER — CEFAZOLIN SODIUM 1 G/50ML
2 SOLUTION INTRAVENOUS
Status: COMPLETED | OUTPATIENT
Start: 2019-08-29 | End: 2019-08-29

## 2019-08-29 RX ORDER — DEXAMETHASONE SODIUM PHOSPHATE 10 MG/ML
INJECTION, SOLUTION INTRAMUSCULAR; INTRAVENOUS PRN
Status: DISCONTINUED | OUTPATIENT
Start: 2019-08-29 | End: 2019-08-29

## 2019-08-29 RX ORDER — CEFAZOLIN SODIUM 1 G/3ML
1 INJECTION, POWDER, FOR SOLUTION INTRAMUSCULAR; INTRAVENOUS SEE ADMIN INSTRUCTIONS
Status: DISCONTINUED | OUTPATIENT
Start: 2019-08-29 | End: 2019-08-29 | Stop reason: HOSPADM

## 2019-08-29 RX ORDER — SODIUM CHLORIDE, SODIUM LACTATE, POTASSIUM CHLORIDE, CALCIUM CHLORIDE 600; 310; 30; 20 MG/100ML; MG/100ML; MG/100ML; MG/100ML
INJECTION, SOLUTION INTRAVENOUS CONTINUOUS
Status: DISCONTINUED | OUTPATIENT
Start: 2019-08-29 | End: 2019-08-29 | Stop reason: HOSPADM

## 2019-08-29 RX ORDER — GLYCOPYRROLATE 0.2 MG/ML
INJECTION, SOLUTION INTRAMUSCULAR; INTRAVENOUS PRN
Status: DISCONTINUED | OUTPATIENT
Start: 2019-08-29 | End: 2019-08-29

## 2019-08-29 RX ORDER — ONDANSETRON 2 MG/ML
4 INJECTION INTRAMUSCULAR; INTRAVENOUS EVERY 30 MIN PRN
Status: DISCONTINUED | OUTPATIENT
Start: 2019-08-29 | End: 2019-08-29 | Stop reason: HOSPADM

## 2019-08-29 RX ORDER — ONDANSETRON 4 MG/1
4 TABLET, ORALLY DISINTEGRATING ORAL EVERY 30 MIN PRN
Status: DISCONTINUED | OUTPATIENT
Start: 2019-08-29 | End: 2019-08-29 | Stop reason: HOSPADM

## 2019-08-29 RX ADMIN — SUGAMMADEX 150 MG: 100 INJECTION, SOLUTION INTRAVENOUS at 19:51

## 2019-08-29 RX ADMIN — ROCURONIUM BROMIDE 20 MG: 10 INJECTION INTRAVENOUS at 18:20

## 2019-08-29 RX ADMIN — FENTANYL CITRATE 25 MCG: 50 INJECTION INTRAMUSCULAR; INTRAVENOUS at 20:55

## 2019-08-29 RX ADMIN — GLYCOPYRROLATE 0.2 MG: 0.2 INJECTION, SOLUTION INTRAMUSCULAR; INTRAVENOUS at 14:50

## 2019-08-29 RX ADMIN — OXYCODONE HYDROCHLORIDE 5 MG: 5 TABLET ORAL at 23:52

## 2019-08-29 RX ADMIN — FENTANYL CITRATE 50 MCG: 50 INJECTION, SOLUTION INTRAMUSCULAR; INTRAVENOUS at 16:22

## 2019-08-29 RX ADMIN — HYDROMORPHONE HYDROCHLORIDE 0.5 MG: 1 INJECTION, SOLUTION INTRAMUSCULAR; INTRAVENOUS; SUBCUTANEOUS at 19:53

## 2019-08-29 RX ADMIN — PRAVASTATIN SODIUM 20 MG: 20 TABLET ORAL at 22:25

## 2019-08-29 RX ADMIN — DEXAMETHASONE SODIUM PHOSPHATE 4 MG: 10 INJECTION, SOLUTION INTRAMUSCULAR; INTRAVENOUS at 14:00

## 2019-08-29 RX ADMIN — FENTANYL CITRATE 100 MCG: 50 INJECTION, SOLUTION INTRAMUSCULAR; INTRAVENOUS at 13:54

## 2019-08-29 RX ADMIN — CEFAZOLIN 1 G: 1 INJECTION, POWDER, FOR SOLUTION INTRAMUSCULAR; INTRAVENOUS at 17:00

## 2019-08-29 RX ADMIN — ROCURONIUM BROMIDE 50 MG: 10 INJECTION INTRAVENOUS at 13:54

## 2019-08-29 RX ADMIN — PHENYLEPHRINE HYDROCHLORIDE 100 MCG: 10 INJECTION INTRAVENOUS at 14:37

## 2019-08-29 RX ADMIN — LIDOCAINE HYDROCHLORIDE 100 MG: 20 INJECTION, SOLUTION INFILTRATION; PERINEURAL at 13:54

## 2019-08-29 RX ADMIN — ROCURONIUM BROMIDE 20 MG: 10 INJECTION INTRAVENOUS at 15:48

## 2019-08-29 RX ADMIN — Medication 2 G: at 15:00

## 2019-08-29 RX ADMIN — ONDANSETRON 4 MG: 2 INJECTION INTRAMUSCULAR; INTRAVENOUS at 19:50

## 2019-08-29 RX ADMIN — PROPOFOL 50 MG: 10 INJECTION, EMULSION INTRAVENOUS at 19:53

## 2019-08-29 RX ADMIN — ACETAMINOPHEN 975 MG: 325 TABLET, FILM COATED ORAL at 04:50

## 2019-08-29 RX ADMIN — HYDROMORPHONE HYDROCHLORIDE 0.5 MG: 1 INJECTION, SOLUTION INTRAMUSCULAR; INTRAVENOUS; SUBCUTANEOUS at 15:31

## 2019-08-29 RX ADMIN — SODIUM CHLORIDE 100 ML/HR: 9 INJECTION, SOLUTION INTRAVENOUS at 20:55

## 2019-08-29 RX ADMIN — FENTANYL CITRATE 100 MCG: 50 INJECTION, SOLUTION INTRAMUSCULAR; INTRAVENOUS at 15:28

## 2019-08-29 RX ADMIN — ROCURONIUM BROMIDE 20 MG: 10 INJECTION INTRAVENOUS at 16:40

## 2019-08-29 RX ADMIN — SODIUM CHLORIDE: 9 INJECTION, SOLUTION INTRAVENOUS at 03:13

## 2019-08-29 RX ADMIN — SODIUM CHLORIDE, POTASSIUM CHLORIDE, SODIUM LACTATE AND CALCIUM CHLORIDE: 600; 310; 30; 20 INJECTION, SOLUTION INTRAVENOUS at 13:46

## 2019-08-29 RX ADMIN — SODIUM CHLORIDE, POTASSIUM CHLORIDE, SODIUM LACTATE AND CALCIUM CHLORIDE: 600; 310; 30; 20 INJECTION, SOLUTION INTRAVENOUS at 15:21

## 2019-08-29 RX ADMIN — ONDANSETRON 4 MG: 2 INJECTION INTRAMUSCULAR; INTRAVENOUS at 14:00

## 2019-08-29 RX ADMIN — PROPOFOL 150 MG: 10 INJECTION, EMULSION INTRAVENOUS at 13:54

## 2019-08-29 RX ADMIN — ROCURONIUM BROMIDE 20 MG: 10 INJECTION INTRAVENOUS at 17:36

## 2019-08-29 RX ADMIN — ROCURONIUM BROMIDE 20 MG: 10 INJECTION INTRAVENOUS at 14:51

## 2019-08-29 ASSESSMENT — ACTIVITIES OF DAILY LIVING (ADL)
ADLS_ACUITY_SCORE: 13

## 2019-08-29 ASSESSMENT — LIFESTYLE VARIABLES: TOBACCO_USE: 1

## 2019-08-29 NOTE — ANESTHESIA PREPROCEDURE EVALUATION
Anesthesia Pre-Procedure Evaluation    Patient: Sweetie Carrero   MRN:     [id number] Gender:   male   Age:    69 year old :      1950        Preoperative Diagnosis: Paget's Disease   Procedure(s):  Resection of Perineal Skin Lesion     Past Medical History:   Diagnosis Date     Diabetes (H)     controlled by diet      Hypertension      Kidney stones      Paget's disease of skin of scrotum (H)       Past Surgical History:   Procedure Laterality Date     COLONOSCOPY N/A 2019    Procedure: Colonoscopy;  Surgeon: Von Owen MD;  Location: [facility] OR     EXAM UNDER ANESTHESIA ANUS N/A 2019    Procedure: Examination Under Anesthesia with Perianal Mapping and Biopsies;  Surgeon: Von Owen MD;  Location: [facility] OR     EXCISE LESION PERINEAL N/A 2019    Procedure: Wide Local Excision of Perineal Skin Lesion;  Surgeon: Fracisco Gtz MD;  Location: [facility] OR     EXPLORE SCROTUM N/A 2018    Procedure: Wide Local Excision of Scrotal Lesions;  Surgeon: Fracisco Gtz MD;  Location: [facility] OR     GRAFT SKIN SPLIT THICKNESS FROM EXTREMITY Left 2019    Procedure: Split Thickness Skin Graft From Left Thigh;  Surgeon: Fracisco Gtz MD;  Location: [facility] OR     SCROTOPLASTY N/A 2019    Procedure: Removal Of Scrotal Skin;  Surgeon: Fracisco Gtz MD;  Location: [facility] OR          Anesthesia Evaluation     .             ROS/MED HX    ENT/Pulmonary:     (+)tobacco use, Past use , . .    Neurologic:       Cardiovascular:     (+) Dyslipidemia, hypertension----. : . . . :. .       METS/Exercise Tolerance:     Hematologic:         Musculoskeletal:         GI/Hepatic:         Renal/Genitourinary:     (+) Nephrolithiasis ,       Endo:     (+) type II DM .      Psychiatric:         Infectious Disease:         Malignancy:   (+) Malignancy   Extramammar pagets disease of scrotum        Other:                         PHYSICAL EXAM:   Mental Status/Neuro: A/A/O   Airway: Facies:  "Feasible  Mallampati: II  Mouth/Opening: Full  TM distance: < 6 cm  Neck ROM: Full   Respiratory:   Resp. Rate: Normal     Resp. Effort: Normal      CV:    Comments: Poor general dentition     Dental: Details                LABS:  CBC:   Lab Results   Component Value Date    WBC 5.4 08/29/2019    WBC 7.4 08/28/2019    HGB 13.1 (L) 08/29/2019    HGB 13.6 08/28/2019    HCT 41.8 08/29/2019    HCT 45.9 08/28/2019     08/29/2019     08/28/2019     BMP:   Lab Results   Component Value Date     08/29/2019     08/28/2019    POTASSIUM 3.6 08/29/2019    POTASSIUM 3.4 08/28/2019    CHLORIDE 112 (H) 08/29/2019    CHLORIDE 110 (H) 08/28/2019    CO2 28 08/29/2019    CO2 26 08/28/2019    BUN 9 08/29/2019    BUN 13 08/28/2019    CR 0.59 (L) 08/29/2019    CR 0.59 (L) 08/28/2019     (H) 08/29/2019     (H) 08/28/2019     COAGS: No results found for: PTT, INR, FIBR  POC:   Lab Results   Component Value Date    BGM 84 08/29/2019     OTHER:   Lab Results   Component Value Date    NICK 8.2 (L) 08/29/2019        Preop Vitals    BP Readings from Last 3 Encounters:   08/29/19 (!) 157/78   05/24/19 112/70   04/02/19 142/76    Pulse Readings from Last 3 Encounters:   08/28/19 68   04/02/19 81   03/17/19 80      Resp Readings from Last 3 Encounters:   08/29/19 16   05/24/19 16   03/17/19 18    SpO2 Readings from Last 3 Encounters:   08/29/19 99%   05/24/19 98%   04/02/19 98%      Temp Readings from Last 1 Encounters:   08/29/19 36.7  C (98  F) (Oral)    Ht Readings from Last 1 Encounters:   08/27/19 1.702 m (5' 7\")      Wt Readings from Last 1 Encounters:   08/27/19 69.7 kg (153 lb 10.6 oz)    Estimated body mass index is 24.07 kg/m  as calculated from the following:    Height as of 8/27/19: 1.702 m (5' 7\").    Weight as of 8/27/19: 69.7 kg (153 lb 10.6 oz).     LDA:  Peripheral IV 02/21/19 Left Lower forearm (Active)   Number of days: 189       Peripheral IV 08/27/19 Left Hand (Active)   Site Assessment " WDL 8/29/2019  8:00 AM   Line Status Infusing 8/29/2019  8:00 AM   Phlebitis Scale 0-->no symptoms 8/29/2019  8:00 AM   Infiltration Scale 0 8/29/2019  8:00 AM   Infiltration Site Treatment Method  None 8/29/2019 12:00 AM   Extravasation? No 8/29/2019  8:00 AM   Number of days: 2       ETT (adult) (Active)   Number of days: 0       Urethral Catheter Double-lumen;Non-latex;Straight-tip 16 fr (Active)   Number of days: 189        Assessment:   ASA SCORE: 2    H&P: History and physical reviewed and following examination; no interval change.         Plan:   Anes. Type:  General   Pre-Medication: None   Induction:  IV (Standard)   Airway: ETT; Oral; CMAC/VL   Access/Monitoring: PIV   Maintenance: Balanced     Postop Plan:   Postop Pain: Opioids  Postop Sedation/Airway: Not planned     PONV Management:   Adult Risk Factors:, Postop Opioids   Prevention: Ondansetron     CONSENT: Direct conversation   Plan and risks discussed with: Patient   Blood Products: Consented (ALL Blood Products)                       Shashi Brown MD

## 2019-08-29 NOTE — PROGRESS NOTES
"Urology  Progress Note  08/29/2019    - No acute overnight events  - HTN (155/78) but o/w AF and VSS  - NPO for surgery today      Exam  BP (!) 155/78 (BP Location: Right arm)   Pulse 68   Temp 97.4  F (36.3  C) (Oral)   Resp 16   Ht 1.702 m (5' 7\")   Wt 69.7 kg (153 lb 10.6 oz)   SpO2 98%   BMI 24.07 kg/m    No acute distress  Unlabored breathing  Abdomen soft, nontender, nondistended.  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 Back to OR today for plastics reconstruction.    Neuro: Sched tylenol, PRN IV dilaudid and oxycodone for pain control  CV: HDS.  Pulm: incentive spirometry while awake  FEN/GI: NPO, MIVF @ 100/hr  Endo: PRISCILA.  : Monitor UOP. Wet to dry dressing change BID. Pathology negative. Scheduled for flap vs. graft with Plastics (Dr Myles) today, 8/29  Heme/ID: Hgb pending. Ronel-op abx complete. Monitor for fevers and leukocytosis.  Activity: Up ad karen  PPx: SCDs    Seen and examined with the chief resident. Will discuss with Dr. Gtz.    Kvng Dexter MD  Urology Resident     Contacting the Urology Team     Please use the following job codes to reach the Urology Team. Note that you must use an in house phone and that job codes cannot receive text pages.     On weekdays, dial [phone] (or star-star-star 777 on the new [employer] telephones) then [phone] to reach the Adult Urology resident or PA on call    On weekdays, dial [phone] (or star-star-star 777 on the new [employer] telephones) then [phone] to reach the Pediatric Urology resident    On weeknights and weekends, dial [phone] (or star-star-star 777 on the new [employer] telephones) then [phone] to reach the Urology resident on call (for both Adult and Pediatrics)              "

## 2019-08-29 NOTE — PLAN OF CARE
"BP (!) 155/78 (BP Location: Right arm)   Pulse 68   Temp 97.4  F (36.3  C) (Oral)   Resp 16   Ht 1.702 m (5' 7\")   Wt 69.7 kg (153 lb 10.6 oz)   SpO2 98%   BMI 24.07 kg/m     Patient NPO for OR later afternoon.  Denies pain, adequate relief using scheduled tylenol.  Scrotal swelling, packing in place.  Voiding with stand by assist.   Abd soft, positive bowel sounds and flatus, no bm.  Continue with POC.  "

## 2019-08-29 NOTE — PLAN OF CARE
Vitals:    08/29/19 0000 08/29/19 0312 08/29/19 0811 08/29/19 0935   BP: 136/78 (!) 155/78 (!) 140/77 (!) 157/78   BP Location: Right arm Right arm Right arm Right arm   Pulse:       Resp: 16 16 16 16   Temp: 97.1  F (36.2  C) 97.4  F (36.3  C) 97.5  F (36.4  C) 98  F (36.7  C)   TempSrc: Oral Oral Oral Oral   SpO2: 98% 98% 98% 99%   Weight:       Height:       0600   POD#2 perirectal lesion excision(hx pagets) packed with dry gauze, POD#0 perirectal incision repair. VSS, WDL(Resp, Neuro, Cardiac), Skin ex-incison, previous graft site left thigh    Transferred to pre-op at 0945, continue with plan of care.

## 2019-08-29 NOTE — PLAN OF CARE
"/61 (BP Location: Right arm)   Pulse 68   Temp 97.4  F (36.3  C) (Oral)   Resp 16   Ht 1.702 m (5' 7\")   Wt 69.7 kg (153 lb 10.6 oz)   SpO2 98%   BMI 24.07 kg/m      Status: POD#1 s/p wide local excision of perineal lesion; admit for paget's disease exacerbation  Activity: SBA  Neuros: A&Ox4, no deficits noted.  Cardiac: WDL, denies chest pain.  Respiratory: WDL on RA, denies SOB.  GI/: +BS, passing flatus, LBM 8/26. Voids spont with adequate output.  Diet: Tolerating regular diet.  Skin/Incisions: Perineal wound packed and covered with small ABD; no drainage; scrotal edema noted.  Lines/Drains: Left PIV running NS at 100/hr.  Pain/Nausea: Pt states pain is tolerable with scheduled tylenol, denies nausea.  New Changes: No acute changes this shift.  Plan: Plan to go to OR tomorrow afternoon for skin graft or skin flap repair of open perineal wound.    "

## 2019-08-30 ENCOUNTER — APPOINTMENT (OUTPATIENT)
Dept: PHYSICAL THERAPY | Facility: CLINIC | Age: 69
DRG: 578 | End: 2019-08-30
Attending: UROLOGY
Payer: MEDICARE

## 2019-08-30 LAB
ANION GAP SERPL CALCULATED.3IONS-SCNC: 7 MMOL/L (ref 3–14)
BUN SERPL-MCNC: 10 MG/DL (ref 7–30)
CALCIUM SERPL-MCNC: 8 MG/DL (ref 8.5–10.1)
CHLORIDE SERPL-SCNC: 111 MMOL/L (ref 94–109)
CO2 SERPL-SCNC: 25 MMOL/L (ref 20–32)
CREAT SERPL-MCNC: 0.61 MG/DL (ref 0.66–1.25)
ERYTHROCYTE [DISTWIDTH] IN BLOOD BY AUTOMATED COUNT: 12.4 % (ref 10–15)
GFR SERPL CREATININE-BSD FRML MDRD: >90 ML/MIN/{1.73_M2}
GLUCOSE SERPL-MCNC: 124 MG/DL (ref 70–99)
HCT VFR BLD AUTO: 39 % (ref 40–53)
HGB BLD-MCNC: 12 G/DL (ref 13.3–17.7)
MCH RBC QN AUTO: 27 PG (ref 26.5–33)
MCHC RBC AUTO-ENTMCNC: 30.8 G/DL (ref 31.5–36.5)
MCV RBC AUTO: 88 FL (ref 78–100)
PLATELET # BLD AUTO: 156 10E9/L (ref 150–450)
POTASSIUM SERPL-SCNC: 3.8 MMOL/L (ref 3.4–5.3)
RBC # BLD AUTO: 4.44 10E12/L (ref 4.4–5.9)
SODIUM SERPL-SCNC: 142 MMOL/L (ref 133–144)
WBC # BLD AUTO: 8.7 10E9/L (ref 4–11)

## 2019-08-30 PROCEDURE — 25000132 ZZH RX MED GY IP 250 OP 250 PS 637: Mod: GY | Performed by: STUDENT IN AN ORGANIZED HEALTH CARE EDUCATION/TRAINING PROGRAM

## 2019-08-30 PROCEDURE — 85027 COMPLETE CBC AUTOMATED: CPT | Performed by: STUDENT IN AN ORGANIZED HEALTH CARE EDUCATION/TRAINING PROGRAM

## 2019-08-30 PROCEDURE — 80048 BASIC METABOLIC PNL TOTAL CA: CPT | Performed by: STUDENT IN AN ORGANIZED HEALTH CARE EDUCATION/TRAINING PROGRAM

## 2019-08-30 PROCEDURE — 36415 COLL VENOUS BLD VENIPUNCTURE: CPT | Performed by: STUDENT IN AN ORGANIZED HEALTH CARE EDUCATION/TRAINING PROGRAM

## 2019-08-30 PROCEDURE — 97162 PT EVAL MOD COMPLEX 30 MIN: CPT | Mod: GP

## 2019-08-30 PROCEDURE — 12000001 ZZH R&B MED SURG/OB UMMC

## 2019-08-30 PROCEDURE — 25000128 H RX IP 250 OP 636: Performed by: STUDENT IN AN ORGANIZED HEALTH CARE EDUCATION/TRAINING PROGRAM

## 2019-08-30 PROCEDURE — 25800029 ZZH RX IP 258 OP 250: Performed by: STUDENT IN AN ORGANIZED HEALTH CARE EDUCATION/TRAINING PROGRAM

## 2019-08-30 PROCEDURE — 97530 THERAPEUTIC ACTIVITIES: CPT | Mod: GP

## 2019-08-30 PROCEDURE — 25800030 ZZH RX IP 258 OP 636: Performed by: STUDENT IN AN ORGANIZED HEALTH CARE EDUCATION/TRAINING PROGRAM

## 2019-08-30 PROCEDURE — 97116 GAIT TRAINING THERAPY: CPT | Mod: GP

## 2019-08-30 PROCEDURE — 97110 THERAPEUTIC EXERCISES: CPT | Mod: GP

## 2019-08-30 RX ORDER — CARBOXYMETHYLCELLULOSE SODIUM 5 MG/ML
1 SOLUTION/ DROPS OPHTHALMIC 4 TIMES DAILY PRN
Status: DISCONTINUED | OUTPATIENT
Start: 2019-08-30 | End: 2019-09-01 | Stop reason: HOSPADM

## 2019-08-30 RX ORDER — SODIUM CHLORIDE 450 MG/100ML
INJECTION, SOLUTION INTRAVENOUS CONTINUOUS
Status: DISCONTINUED | OUTPATIENT
Start: 2019-08-30 | End: 2019-08-31

## 2019-08-30 RX ADMIN — OXYCODONE HYDROCHLORIDE 10 MG: 5 TABLET ORAL at 18:40

## 2019-08-30 RX ADMIN — SODIUM CHLORIDE: 4.5 INJECTION, SOLUTION INTRAVENOUS at 21:26

## 2019-08-30 RX ADMIN — OXYCODONE HYDROCHLORIDE 5 MG: 5 TABLET ORAL at 10:35

## 2019-08-30 RX ADMIN — OXYCODONE HYDROCHLORIDE 10 MG: 5 TABLET ORAL at 14:33

## 2019-08-30 RX ADMIN — SODIUM CHLORIDE: 4.5 INJECTION, SOLUTION INTRAVENOUS at 09:42

## 2019-08-30 RX ADMIN — ACETAMINOPHEN 975 MG: 325 TABLET, FILM COATED ORAL at 13:18

## 2019-08-30 RX ADMIN — OXYCODONE HYDROCHLORIDE 5 MG: 5 TABLET ORAL at 23:39

## 2019-08-30 RX ADMIN — ACETAMINOPHEN 975 MG: 325 TABLET, FILM COATED ORAL at 05:05

## 2019-08-30 RX ADMIN — SODIUM CHLORIDE: 9 INJECTION, SOLUTION INTRAVENOUS at 08:47

## 2019-08-30 RX ADMIN — ACETAMINOPHEN 650 MG: 325 TABLET, FILM COATED ORAL at 21:21

## 2019-08-30 RX ADMIN — OXYCODONE HYDROCHLORIDE 5 MG: 5 TABLET ORAL at 08:35

## 2019-08-30 RX ADMIN — CEFAZOLIN 1 G: 1 INJECTION, POWDER, FOR SOLUTION INTRAMUSCULAR; INTRAVENOUS at 01:06

## 2019-08-30 RX ADMIN — SODIUM CHLORIDE 500 ML: 9 INJECTION, SOLUTION INTRAVENOUS at 03:47

## 2019-08-30 RX ADMIN — Medication 1 MG: at 23:39

## 2019-08-30 RX ADMIN — CARBOXYMETHYLCELLULOSE SODIUM 1 DROP: 5 SOLUTION/ DROPS OPHTHALMIC at 19:39

## 2019-08-30 RX ADMIN — PRAVASTATIN SODIUM 20 MG: 20 TABLET ORAL at 21:21

## 2019-08-30 RX ADMIN — CEFAZOLIN 1 G: 1 INJECTION, POWDER, FOR SOLUTION INTRAMUSCULAR; INTRAVENOUS at 08:35

## 2019-08-30 RX ADMIN — SODIUM CHLORIDE 500 ML: 9 INJECTION, SOLUTION INTRAVENOUS at 09:42

## 2019-08-30 ASSESSMENT — ACTIVITIES OF DAILY LIVING (ADL)
ADLS_ACUITY_SCORE: 13

## 2019-08-30 ASSESSMENT — PAIN DESCRIPTION - DESCRIPTORS: DESCRIPTORS: ACHING;CONSTANT

## 2019-08-30 NOTE — PLAN OF CARE
"/55   Pulse 75   Temp 98.8  F (37.1  C) (Oral)   Resp 18   Ht 1.702 m (5' 7\")   Wt 69.7 kg (153 lb 10.6 oz)   SpO2 93%   BMI 24.07 kg/m      Reason for admission: Closure of perineal wound with left leg flap, underneath scrotum   Neuro: A&Ox4, calls appropriately  Cardiac: WDL, BP dropped to 100/55 @ 0300, MD notifed & given 500 ml NS bolus, dropped to 94/54 and MD notified, keep monitoring, check flap pulse and L dorsalis pedis with doppler (marked), no numbness/tingling  Respiratory: WDL, satting 92-94% on RA  GI/: Scrotal edema with gauze surrounding flap, catheter with good output, no BM this shift, last BM: 8/26 (ok, want to reduce straining on flap)  Pain: Scheduled tylenol given, Oxy 5 mg given and had relief, left leg hurts more than flap  LDA: PIV L-100 ml/hr, abdominal ЕКАТЕРИНА bulb drains on L and R abd, LLE incision MILY, underneath ACE wraps with dressing  Activity: bedrest, elevated LLE, HOB <30, no sitting yet  Diet/Appetite: clear liquids, tolerating   Plan: Removal of catheter, continue POC    "

## 2019-08-30 NOTE — ANESTHESIA POSTPROCEDURE EVALUATION
Anesthesia POST Procedure Evaluation    Patient: Sweetie Carrero   MRN:     [id number] Gender:   male   Age:    69 year old :      1950        Preoperative Diagnosis: Extramammary Paget's Disease Of Scrotum   Procedure(s):  Closure Of Perineal Wound With Left  Local And Regional Flaps  Skin Graft Left inner thigh.   Postop Comments: No value filed.       Anesthesia Type:  Not documented  General    Reportable Event: NO     PAIN: Uncomplicated   Sign Out status: Comfortable, Well controlled pain     PONV: No PONV   Sign Out status:  No Nausea or Vomiting     Neuro/Psych: Uneventful perioperative course   Sign Out Status: Preoperative baseline; Age appropriate mentation     Airway/Resp.: Uneventful perioperative course   Sign Out Status: Non labored breathing, age appropriate RR; Resp. Status within EXPECTED Parameters     CV: Uneventful perioperative course   Sign Out status: Appropriate BP and perfusion indices; Appropriate HR/Rhythm     Disposition:   Sign Out in:  PACU  Disposition:  Floor  Recovery Course: Uneventful  Follow-Up: Not required           Last Anesthesia Record Vitals:  CRNA VITALS  2019 1933 - 2019 2033      2019             EKG:  Sinus rhythm          Last PACU Vitals:  Vitals Value Taken Time   /89 2019  9:20 PM   Temp 36.9  C (98.4  F) 2019  9:15 PM   Pulse 87 2019  9:20 PM   Resp 12 2019  9:15 PM   SpO2 96 % 2019  9:24 PM   Temp src     NIBP 125/84 2019  8:08 PM   Pulse 94 2019  8:08 PM   SpO2 99 % 2019  8:08 PM   Resp     Temp     Ht Rate     Temp 2     Vitals shown include unvalidated device data.      Electronically Signed By: Ashly Cavazos MD, 2019, 9:32 PM

## 2019-08-30 NOTE — PLAN OF CARE
"BP (!) 155/85 (BP Location: Right arm)   Pulse 90   Temp 97.9  F (36.6  C) (Axillary)   Resp 19   Ht 1.702 m (5' 7\")   Wt 69.7 kg (153 lb 10.6 oz)   SpO2 96%   BMI 24.07 kg/m      Status: POD#0 s/p closure of perineal wound with left flap  Activity: Not yet out of bed; Ax2 to turn  Neuros: A&Ox4, no deificts noted.  Cardiac: WDL, CMS intact, denies chest pain. CMS checks Q2hrs; Pedal pulses +2, good capillary refill.   Respiratory: WDL on RA, denies SOB.  GI/: Eisenberg catheter in place with good urine output. +BS, not passing gas; LBM 8/26.  Diet: Tolerating clear liquid diet.  Skin/Incisions: Flap site +pulse with doppler; site marked. Site is pink and soft. Left leg ACE wrapped; UTV incision, dressing CDI.  Lines/Drains: 2 left PIVs SL. Eisenberg catheter in place, to be pulled POD#1.  Pain/Nausea: Declines pain medication, states he \"still feels numb,\" denies nausea.  Plan: Continue to monitor and follow POC.    "

## 2019-08-30 NOTE — BRIEF OP NOTE
[facility], [place]    Brief Operative Note    Pre-operative diagnosis: Extramammary Paget's Disease Of Scrotum  Post-operative diagnosis * No post-op diagnosis entered *  Procedure: Procedure(s):  Closure Of Perineal Wound With Tunneled Pedicled ALT Flap  Surgeon: Surgeon(s) and Role:     * Aditya Myles MD - Primary     * Juan Bacon MD - Resident - Assisting     * María Terry MD - Resident - Assisting  Anesthesia: General   Estimated blood loss: 150cc  Drains: Marc-Tucker x2  Specimens: * No specimens in log *  Findings:   None.  Complications: None.  Implants:  * No implants in log *     Plan:  - Admit to Urology  - Q2H Flap checks  - CMS checks Q2H  - Elevate LLE  - ADAT  - Strip drains Q8H  - Eisenberg out tomorrow  - Bed rest tonight  - HOB < 30'. No sitting.       Juan Bacno MD  Plastic surgery resident

## 2019-08-30 NOTE — PROGRESS NOTES
"   08/30/19 1100   Quick Adds   Type of Visit Initial PT Evaluation   Living Environment   Lives With spouse;child(alpesh), adult  (lives with son and dtr-in-law)   Living Arrangements house   Home Accessibility no concerns   Transportation Anticipated family or friend will provide   Living Environment Comment Pt lives with SO in a house were all needs are met on main level with access from ground level therefore, he has not stairs to complete. Son works FT job were he will not be able to assist.    Self-Care   Usual Activity Tolerance moderate   Current Activity Tolerance poor   Regular Exercise No   Equipment Currently Used at Home cane, straight;grab bar, toilet;grab bar, tub/shower;shower chair;walker, rolling   Activity/Exercise/Self-Care Comment IND at baseline for self cares.   Functional Level Prior   Ambulation 0-->independent   Transferring 0-->independent   Toileting 0-->independent   Bathing 0-->independent   Communication 0-->understands/communicates without difficulty   Swallowing 0-->swallows foods/liquids without difficulty   Cognition 0 - no cognition issues reported   Fall history within last six months yes   Number of times patient has fallen within last six months 1  (fainted)   Which of the above functional risks had a recent onset or change? ambulation;transferring;toileting;fall history   Prior Functional Level Comment Pt was previously IND with all self cares and functional mobility. Pt has had a few surgeries for similar etiology.    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 This is his 4th excision for same etiology, last surgery was may 2019.\" "   Precautions/Limitations fall precautions   Weight-Bearing Status - LLE other (see comments)  (no hip abduction)   General Observations Pt is up SBA-CGA with FWW.   General Info Comments Activity: POD#1 from flap; per plastics:  Ok to ambulate. Limit sitting to 5 min max. HOB < 30'. Ok for 45' for meal time;  No hip abduction, no large steps.   Cognitive Status Examination   Orientation orientation to person, place and time   Level of Consciousness alert   Follows Commands and Answers Questions 100% of the time   Personal Safety and Judgment intact   Memory intact   Pain Assessment   Patient Currently in Pain Yes, see Vital Sign flowsheet   Range of Motion (ROM)   ROM Comment WFL for functional mobility - not formally tested   Strength   Strength Comments WFL for functional mobility - not formally tested   Bed Mobility   Bed Mobility Comments SBA-CGA   Transfer Skills   Transfer Comments SBA-CGA + FWW   Gait   Gait Comments SBA-CGA + FWW   Balance   Balance Comments Impaired   General Therapy Interventions   Planned Therapy Interventions bed mobility training;progressive activity/exercise;home program guidelines;risk factor education;transfer training;strengthening;gait training;neuromuscular re-education   Clinical Impression   Criteria for Skilled Therapeutic Intervention yes, treatment indicated   PT Diagnosis Impaired functional mobility post op   Influenced by the following impairments precautions, pain, strength, balance   Functional limitations due to impairments limited activity tolerance, use of FWW, supervision/assist, rest breaks   Clinical Presentation Evolving/Changing   Clinical Presentation Rationale clinical judgement and chart review   Clinical Decision Making (Complexity) Moderate complexity   Therapy Frequency 5x/week   Predicted Duration of Therapy Intervention (days/wks) week   Anticipated Equipment Needs at Discharge   (TBD)   Anticipated Discharge Disposition Home with Assist;Home with Home  "Therapy   Risk & Benefits of therapy have been explained Yes   Patient, Family & other staff in agreement with plan of care Yes   [facility] AM-PAC TM \"6 Clicks\"   2016, Trustees of [facility], under license to [employer].  All rights reserved.   6 Clicks Short Forms Basic Mobility Inpatient Short Form   [facility] AM-PAC  \"6 Clicks\" V.2 Basic Mobility Inpatient Short Form   1. Turning from your back to your side while in a flat bed without using bedrails? 3 - A Little   2. Moving from lying on your back to sitting on the side of a flat bed without using bedrails? 3 - A Little   3. Moving to and from a bed to a chair (including a wheelchair)? 3 - A Little   4. Standing up from a chair using your arms (e.g., wheelchair, or bedside chair)? 3 - A Little   5. To walk in hospital room? 3 - A Little   6. Climbing 3-5 steps with a railing? 2 - A Lot   Basic Mobility Raw Score (Score out of 24.Lower scores equate to lower levels of function) 17   Total Evaluation Time   Total Evaluation Time (Minutes) 10     "

## 2019-08-30 NOTE — PROGRESS NOTES
"Plastic surgery progress note    No acute events overnight. Pain controlled. Tolerating PO. On bedrest    BP 94/54 (BP Location: Right arm)   Pulse 83   Temp 97.7  F (36.5  C) (Oral)   Resp 14   Ht 1.702 m (5' 7\")   Wt 69.7 kg (153 lb 10.6 oz)   SpO2 95%   BMI 24.07 kg/m      A&O, NAD  Unlabored breathing on RA  Perineum flap wwp. Strong arterial and doppler signal. Drain ssd  LLE dressing c/d/i. Foot wwp. SILT. Drain ssd.    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 Then ok to go to Q4H  - LLE CMS checks Q2H  - Elevate LLE on foam wedge  - ADAT  - Strip drains Q8H  - Ok to take out Eisenberg   - Ok to ambulate. Limit sitting to 5 min max. HOB < 30'. Ok for 45' for meal time  - No hip abduction, no large steps. May require PT    Discussed w/ Dr Shameka Bacon MD  Plastic surgery resident     "

## 2019-08-30 NOTE — ANESTHESIA CARE TRANSFER NOTE
Patient: Sweetie Carrero    Procedure(s):  Closure Of Perineal Wound With Left  Local And Regional Flaps  Skin Graft Left inner thigh.    Diagnosis: Extramammary Paget's Disease Of Scrotum  Diagnosis Additional Information: No value filed.    Anesthesia Type:   General     Note:  Airway :Face Mask  Patient transferred to:PACU  Comments: VSS. Breathing spont. Report to RN at bedside.Handoff Report: Identifed the Patient, Identified the Reponsible Provider, Reviewed the pertinent medical history, Discussed the surgical course, Reviewed Intra-OP anesthesia mangement and issues during anesthesia, Set expectations for post-procedure period and Allowed opportunity for questions and acknowledgement of understanding      Vitals: (Last set prior to Anesthesia Care Transfer)    CRNA VITALS  8/29/2019 1933 - 8/29/2019 2008      8/29/2019             Pulse:  90    Ht Rate:  91    SpO2:  100 %    Resp Rate (observed):  7  (Abnormal)                 Electronically Signed By: KERRY Willoughby CRNA  August 29, 2019  8:08 PM

## 2019-08-30 NOTE — PLAN OF CARE
Discharge Planner PT  7B  Patient plan for discharge: home with assist from SO and son   Current status: Initiated and complete PT evaluation. Treatment indicated as pt is mobilizing below baseline post op (POD 1 s/p pedicled ALT flap reconstruction). Precautions from plastic surgery: < 5 minutes of sitting, no hip abduction, no large steps. Supine <> EOB, cues for no hip abduction, SBA-CGA. Good sitting balance, Fair standing balance 2/2 LE pain. Pt reporting pain is tolerable to ambulate to doorway and back. Progressed gait endurance and pattern with ~ 40 ft x 2 with turn to R, FWW, SBA - standing rest breaks completed 2/2 pain and fatigue. Heavy B UE support through walker. Increased unsteadiness with turn though able to adjust/compensate safely. Engaged in supine LE HEP with modifications to adhere to precautions and pain tolerance. Recommend pt complete LE HEP and ambulate as tolerated with FWW + SBA from nursing.   Barriers to return to prior living situation: medical status, current mob, pain, precautions  Recommendations for discharge: Anticipate home pending progression with therapy  Rationale for recommendations: PT anticipates that pt will be able to return to prior living arrangement pending progression with therapy for activity tolerance and pain. Pt lives with SO who can provide 24/7 supervision though no physical assistance 2/2 injury to shoulder. Son and daughter in law live in same residence. Extensive AE at home from previous surgeries. Will continue to update recs as able. Pt may require home PT.          Entered by: Aleida Leonardo 08/30/2019 1:29 PM

## 2019-08-30 NOTE — PROGRESS NOTES
"Urology  Progress Note  08/30/2019    - No acute overnight events  - Required bolus overnight for soft pressures, responded appropriately   - Bed rest per plastics, HOB < 30, elevate LLE   - Tolerating CLD   - Hypotensive this AM  - No chest pain or shortness of breath  - Having mild pain at surgical sites    Exam  /58   Pulse 78   Temp 98.8  F (37.1  C) (Oral)   Resp 18   Ht 1.702 m (5' 7\")   Wt 69.7 kg (153 lb 10.6 oz)   SpO2 93%   BMI 24.07 kg/m    No acute distress  Unlabored breathing  Abdomen soft, nontender, nondistended.  Perineal flap appears viable  Left thigh site w/ dressing, minimally saturated, closed with staples    I/O's (last 24/since midnight)  UOP 3950/1325  ЕКАТЕРИНА LLE 15/15  ЕКАТЕРИНА perineum 20/20    Labs  AM labs pending    Assessment/Plan  69 year old male POD#3 s/p wide local exicision for extramammary Paget's disease of scrotum and perineum. Also POD#1 s/p closure of perineal wound with tunneled pedicled anterolateral thigh flap.    Neuro: Sched tylenol, PRN IV dilaudid and oxycodone for pain control. CMS checks Q2H  CV: Required bolus overnight, currently HDS   Pulm: incentive spirometry while awake  FEN/GI: CLD, MIVF @ 100/hr  Endo: PRISCILA.  : Eisenberg in place, monitor UOP   Heme/ID: Hgb pending. Ronel-op abx complete. Monitor for fevers and leukocytosis.  Activity: Bed rest per plastics, HOB < 30, elevate LLE per plastics   Wound: Q2H flap checks, ЕКАТЕРИНА drain management per plastics   PPx: SCDs    Seen and examined with the chief resident. Will discuss with Dr. Gtz.    Anibal Rios MD  Urology Resident     Contacting the Urology Team     Please use the following job codes to reach the Urology Team. Note that you must use an in house phone and that job codes cannot receive text pages.     On weekdays, dial [phone] (or star-star-star 777 on the new [place] telephones) then [phone] to reach the Adult Urology resident or PA on call    On weekdays, dial [phone] (or star-star-star 777 on the new [place] " telephones) then 0818 to reach the Pediatric Urology resident    On weeknights and weekends, dial [phone] (or star-star-star 777 on the new [place] telephones) then [phone] to reach the Urology resident on call (for both Adult and Pediatrics)

## 2019-08-30 NOTE — OP NOTE
DATE OF OPERATION: August 29, 2019    PREOPERATIVE DIAGNOSIS: Perineal extramammary Paget's disease status post wide local excision, now ready for definitive closure.    POSTOPERATIVE DIAGNOSIS: Perineal extramammary Paget's disease status post wide local excision, size 7 x 12 cm.    PROCEDURES PERFORMED: Coverage of perineal extramammary Paget's disease excision wound with pedicled left anterolateral thigh fasciocutaneous flap, size 7 x 20 cm.    SURGEON: Aditya Myles MD    ASSISTANT: Juan Bacon MD    ANESTHESIA: General.    ESTIMATED BLOOD LOSS: 100 mL    FINDINGS: Pink and well-perfused flap after closure and inset.    SPECIMENS: None.    COMPLICATIONS: None.    DRAINS: 15 Ukrainian ЕКАТЕРИНА drain in the left thigh and 10 Ukrainian ЕКАТЕРИНА drain in the perineum underneath the flap.    IMPLANTS: None.    INDICATIONS: Patient is a 69-year-old male with history of peritoneal extramammary Paget's disease.  He underwent excision and skin grafting of the scrotum in the past.  This is healed, but there were positive margins.  Colorectal involvement was investigated and found to have no disease on random biopsies.  With the urology service patient underwent wide local excision of the perineum and the positive margin.  He was then brought to the hospital for wound cares.  Pathology results showed no remaining Paget's disease.  Plan for definitive closure was made.  Risks benefits and alternatives were discussed for possible local flap, possible skin graft, because possible regional flap.  CT angiogram revealed intact perforators in the left anterolateral thigh.  Patient accepted the associated risks and wished to proceed with surgery.    DESCRIPTION OF PROCEDURE: Informed consent was obtained preoperatively.  The perineum and the left thigh were then marked care for an anterolateral thigh flap.  Patient was then taken to the operating room and placed in the supine position with bilateral lower leg SCDs on.  All pressure points  were well-padded.  Preoperative antibiotics were given.  General anesthesia was obtained.  The perineum the left lower extremity were then prepped and draped in sterile fashion.  A timeout was performed.    Doppler was used to map out the mid and distal perforators to the left anterolateral thigh.  Muscular cutaneous P  was found at 24 cm inferior from the ASIS and the C septic cutaneous  was found approximately 32 cm inferior from the ASIS.  The perineal wound was examined.  This was clean measured 7 x 12 cm with exposed bulbospongiosus muscle and testicles.  The proximity was quite close to the anus as well.  Given these reasons, decision was made to perform a flap coverage rather than a skin graft coverage, which would have a lower chance of healing.  A 7 x 20 cm flap was designed along the left anterolateral thigh based off of the dopplerable perforators.    The medial incision was made and carried down to deep fascia.  A suprafascial dissection was performed to identify both perforators.  The deep fascia was opened longitudinally and sharply with care taken not to damage the perforators.  Retrograde dissection was performed in a 360 degree fashion around the perforators into their source vessel which was the descending branch of the lateral circumflex femoral artery and vein.  Once the flap was completely isolated on the perforators, the lateral incision was made and the flap was detached from the surrounding skin.  A small cuff of fascia was taken around the perforators making this a fasciocutaneous flap.  Skin edges were pink and bleeding.  We then dissected a tunnel underneath the rectus femoris muscle and also underneath the sartorius muscle with a subcutaneous tunnel towards the perineal wound.  Care was taken not to damage sensory nerves and over the saphenous vein.  The flap pedicle was marked and a rotation was performed from lateral to medial and the skin paddle of the flap was  passed underneath both rectus femoris and sartorius muscles to reach the perineal wound.  There was no tension on the pedicle.    The flap donor site was then closed primarily in layers over a 15 [place] ЕКАТЕРИНА drain.  The flap was inset over the perineal wound closed in layers over a 10 [place] ЕКАТЕРИНА drain.  Distal and proximal tips of the flap were then trimmed to fit the wound better.  The incisions were closed in layers with dissolving suture.  A dopplerable signal was found on the skin paddle and marked with a Prolene suture.  The skin paddle was pink and well-perfused.  Both drains held suction at the end of the case.  All counts were correct at the end of the case.  The patient was then expanded, awakened, transferred to the postoperative area in stable condition.     DISPOSITION: Inpatient floor.

## 2019-08-30 NOTE — PLAN OF CARE
"/52 (BP Location: Right arm)   Pulse 91   Temp 97.3  F (36.3  C) (Oral)   Resp 16   Ht 1.702 m (5' 7\")   Wt 69.7 kg (153 lb 10.6 oz)   SpO2 95%   BMI 24.07 kg/m      Reason for admission: hx Paget's disease; POD 3 excision maxx-rectal lesion; POD 1 scrotal reconstruction  Neuro: A&Ox4; cooperative with cares; calls appropriately; CMS q2hr intact  Cardiac: WNL - denies chest pain; orthostatic BP taken for soft BP this am - 500cc bolus given  Respiratory: 95% RA; denies SOB; CAPNO in place  GI/: Eisenberg removed - DTV 1630; LBM 8/26  Skin: Scrotal incision/flap site sutured and dressed with gauze/ABD; pulses present with doppler - sites marked (scrotal and dorsalis); ; LLE ACE wrapped UTV  Pain: PRN oxycodone given when available for LLE pain  LDA: 2 R PIV infusing 0.45%NS @ 100/hr  Activity: Ax1 with walker - OOB x2 today (once with PT); PT gave list of exercises to do while in bed; HOB restrictions (see orders)  Diet/Appetite: Reg diet; denies N/V when eating  Plan: Continue with POC    "

## 2019-08-30 NOTE — PROGRESS NOTES
Brief Social Work Note:    SW was notified by bedside nurse yesterday that pt would like information about Health Care Directives. SW met with pt and his wife Christal at pt's bedside to introduce self and discuss Health Care Directives. Pt noted having already completed a Health Care Directive, that his wife provided this to the MD prior to surgery. Pt noted his wife would be his alternate decision maker followed by his son.     Pt and his wife noted possibly being interested in home care upon discharge and asked for more information about this. SW explained that [facility] would assist in setting up home care. Pt reported wanting to wait and see how he does with PT on Sunday and determine if he would want home care at that time. SW explained that SW could ask for RN[facility] if needed/wanted at that time.     Pt and his wife denied having further SW needs or questions at this time.       JENNIFER Cross, LICSW  7B   [phone] (pager) [phone]  8/30/2019

## 2019-08-31 LAB
ANION GAP SERPL CALCULATED.3IONS-SCNC: 8 MMOL/L (ref 3–14)
BUN SERPL-MCNC: 8 MG/DL (ref 7–30)
CALCIUM SERPL-MCNC: 7.9 MG/DL (ref 8.5–10.1)
CHLORIDE SERPL-SCNC: 107 MMOL/L (ref 94–109)
CO2 SERPL-SCNC: 24 MMOL/L (ref 20–32)
CREAT SERPL-MCNC: 0.64 MG/DL (ref 0.66–1.25)
ERYTHROCYTE [DISTWIDTH] IN BLOOD BY AUTOMATED COUNT: 12.8 % (ref 10–15)
GFR SERPL CREATININE-BSD FRML MDRD: >90 ML/MIN/{1.73_M2}
GLUCOSE SERPL-MCNC: 162 MG/DL (ref 70–99)
HCT VFR BLD AUTO: 37.7 % (ref 40–53)
HGB BLD-MCNC: 12 G/DL (ref 13.3–17.7)
MCH RBC QN AUTO: 27.6 PG (ref 26.5–33)
MCHC RBC AUTO-ENTMCNC: 31.8 G/DL (ref 31.5–36.5)
MCV RBC AUTO: 87 FL (ref 78–100)
PLATELET # BLD AUTO: 146 10E9/L (ref 150–450)
POTASSIUM SERPL-SCNC: 3.7 MMOL/L (ref 3.4–5.3)
RBC # BLD AUTO: 4.35 10E12/L (ref 4.4–5.9)
SODIUM SERPL-SCNC: 138 MMOL/L (ref 133–144)
WBC # BLD AUTO: 7.6 10E9/L (ref 4–11)

## 2019-08-31 PROCEDURE — 80048 BASIC METABOLIC PNL TOTAL CA: CPT | Performed by: STUDENT IN AN ORGANIZED HEALTH CARE EDUCATION/TRAINING PROGRAM

## 2019-08-31 PROCEDURE — 25000132 ZZH RX MED GY IP 250 OP 250 PS 637: Mod: GY | Performed by: STUDENT IN AN ORGANIZED HEALTH CARE EDUCATION/TRAINING PROGRAM

## 2019-08-31 PROCEDURE — 12000001 ZZH R&B MED SURG/OB UMMC

## 2019-08-31 PROCEDURE — 25800029 ZZH RX IP 258 OP 250: Performed by: STUDENT IN AN ORGANIZED HEALTH CARE EDUCATION/TRAINING PROGRAM

## 2019-08-31 PROCEDURE — 25000132 ZZH RX MED GY IP 250 OP 250 PS 637: Mod: GY | Performed by: PLASTIC SURGERY

## 2019-08-31 PROCEDURE — 85027 COMPLETE CBC AUTOMATED: CPT | Performed by: STUDENT IN AN ORGANIZED HEALTH CARE EDUCATION/TRAINING PROGRAM

## 2019-08-31 PROCEDURE — 36415 COLL VENOUS BLD VENIPUNCTURE: CPT | Performed by: STUDENT IN AN ORGANIZED HEALTH CARE EDUCATION/TRAINING PROGRAM

## 2019-08-31 RX ORDER — AMOXICILLIN 250 MG
1 CAPSULE ORAL 2 TIMES DAILY
Status: DISCONTINUED | OUTPATIENT
Start: 2019-08-31 | End: 2019-09-01 | Stop reason: HOSPADM

## 2019-08-31 RX ADMIN — PRAVASTATIN SODIUM 20 MG: 20 TABLET ORAL at 22:05

## 2019-08-31 RX ADMIN — ACETAMINOPHEN 650 MG: 325 TABLET, FILM COATED ORAL at 20:20

## 2019-08-31 RX ADMIN — ACETAMINOPHEN 650 MG: 325 TABLET, FILM COATED ORAL at 06:54

## 2019-08-31 RX ADMIN — SODIUM CHLORIDE: 4.5 INJECTION, SOLUTION INTRAVENOUS at 06:54

## 2019-08-31 RX ADMIN — Medication 1 MG: at 22:05

## 2019-08-31 RX ADMIN — ACETAMINOPHEN 650 MG: 325 TABLET, FILM COATED ORAL at 15:54

## 2019-08-31 RX ADMIN — SENNOSIDES AND DOCUSATE SODIUM 1 TABLET: 8.6; 5 TABLET ORAL at 20:20

## 2019-08-31 RX ADMIN — SENNOSIDES AND DOCUSATE SODIUM 1 TABLET: 8.6; 5 TABLET ORAL at 11:54

## 2019-08-31 ASSESSMENT — ACTIVITIES OF DAILY LIVING (ADL)
ADLS_ACUITY_SCORE: 13

## 2019-08-31 ASSESSMENT — PAIN DESCRIPTION - DESCRIPTORS: DESCRIPTORS: ACHING

## 2019-08-31 ASSESSMENT — MIFFLIN-ST. JEOR: SCORE: 1420.63

## 2019-08-31 NOTE — PLAN OF CARE
"/61 (BP Location: Right arm)   Pulse 91   Temp 98.7  F (37.1  C) (Oral)   Resp 13   Ht 1.702 m (5' 7\")   Wt 69.7 kg (153 lb 10.6 oz)   SpO2 94%   BMI 24.07 kg/m      1500 - 1900    Neuro: A&Ox4.   Cardiac: SR. VSS.   Respiratory: Sating 94% on RA.  GI/: Adequate urine output. Voided 900 ml post fuentes removal.  Diet/appetite: Tolerating regular diet. Eating well.  Activity:  Assist of one up at the side of the bed.  Pain: At acceptable level on current regimen.   Skin: No new deficits noted.  LDA's:PIV infusing.  Plan: Continue with POC. Notify primary team with changes.  "

## 2019-08-31 NOTE — PROGRESS NOTES
"Urology  Progress Note  08/31/2019    - No acute overnight events  - Tolerating regular diet   - Urinating well since fuentes out  - Pain tolerable at rest, worse with ambulation    Exam  BP (!) 143/83 (BP Location: Right arm)   Pulse 91   Temp 100  F (37.8  C) (Oral)   Resp 18   Ht 1.702 m (5' 7\")   Wt 69.7 kg (153 lb 10.6 oz)   SpO2 93%   BMI 24.07 kg/m    No acute distress  Unlabored breathing  Abdomen soft, nontender, nondistended.  Perineal flap appears viable, incisions c/d/i. No bleeding.  Left thigh site w/ dressing, minimally saturated, closed with staples    I/O's (last 24/since midnight)  UOP 3950/1325  ЕКАТЕРИНА LLE 45/15  ЕКАТЕРИНА perineum 45/10    Labs  AM labs pending    Assessment/Plan  69 year old male POD#4 s/p wide local exicision for extramammary Paget's disease of scrotum and perineum. Also POD#2 s/p closure of perineal wound with tunneled pedicled anterolateral thigh flap.    Neuro: Sched tylenol, PRN IV dilaudid and oxycodone for pain control. CMS checks Q2H  CV: Required bolus overnight, currently HDS   Pulm: incentive spirometry while awake  FEN/GI: CLD, MIVF @ 100/hr  Endo: PRISCILA.  : PRISCILA. Fuentes out 8/30.  Heme/ID: Hgb pending. Ronel-op abx complete. Monitor for fevers and leukocytosis.  Activity: Per plastics, Elevate LLE on foam wedge, strip drains Q8, okay to ambulate. Limit sitting to 5 min max. HOB <30'. Ok for 45' for meal time. No hip abduction, no large steps. May require PT.  Wound: Q4H flap checks, ЕКАТЕРИНА drain management per plastics as above  PPx: SCDs    Seen and examined with the chief resident. Will discuss with Dr. Gtz.    Anibal Rios MD  Urology Resident     Contacting the Urology Team     Please use the following job codes to reach the Urology Team. Note that you must use an in house phone and that job codes cannot receive text pages.     On weekdays, dial [phone] (or star-star-star 777 on the new [place] telephones) then 0817 to reach the Adult Urology resident or PA on call    On " weekdays, dial [phone] (or star-star-star 777 on the new [place] telephones) then 0818 to reach the Pediatric Urology resident    On weeknights and weekends, dial [phone] (or star-star-star 777 on the new [employer] telephones) then [phone] to reach the Urology resident on call (for both Adult and Pediatrics)

## 2019-08-31 NOTE — PLAN OF CARE
Vital signs:  Temp: 99.7  F (37.6  C) Temp src: Oral BP: 132/72 Heart Rate: 88 Resp: 20 SpO2: 94 % O2 Device: None (Room air)     Neuro: A&Ox4, calm, calls appropriately.   Cardiac: WNL, denies chest pain.   Respiratory: LS clear, denies SOB.   Pain: Pt well managed with prn oxycodone & tylenol.   Diet: Regular diet, no nausea.   GI/: Voiding adequate amount. Abdomen soft, non-tender. +BS, +flatus, no BM.   Activity: Up ambulating with walker, A1. Pt stands at bedside to void in the urinal with Ax1.    Skin: scrotal incision/flap site sutured and dressed with gauze and ABD pad. LLE ace wrapped. CMS checks & flap checks q2 hrs, now q4 hrs checks. Pulses present with doppler, denies numbness & tingling. Skin warm to touch.   New changes this shift: None, no acute changes this shift.   Plan: Continue POC.

## 2019-08-31 NOTE — PLAN OF CARE
"/66 (BP Location: Right arm)   Pulse 91   Temp 99.8  F (37.7  C) (Oral)   Resp 18   Ht 1.702 m (5' 7\")   Wt 69.7 kg (153 lb 10.6 oz)   SpO2 95%   BMI 24.07 kg/m      1500 - 1900    Neuro: A&Ox4.   Cardiac: SR. VSS.   Respiratory: Sating * on RA.  GI/: Adequate urine output. BM X1  Diet/appetite: Tolerating regular diet. Eating well.  Activity:  Stand by assist  up to chair and in room.  Pain: At acceptable level on current regimen.   Skin: No new deficits noted.  LDA's:PIV saline locked.  Plan: Continue with POC. Notify primary team with changes.  "

## 2019-08-31 NOTE — PLAN OF CARE
"Vitals: Temp: 100  F (37.8  C) Temp src: Oral BP: (!) 143/83   Heart Rate: 87 Resp: 18 SpO2: 93 % O2 Device: None (Room air)        Reason for admission: POD#2 s/p closure of perineal wound with tunneled pedicled anterolateral thigh flap   Activity:  Assist x1 for urinal use.   Pain:  Declines.   Neuro: A&O x4, able to make needs known.   Cardiac: WDL  Respiratory:  WDl, denies SOB.  GI/: Voiding adequately in urinal at bedside with one assist. +BS, +Flatus, No BM this shift.   Diet: Reg diet tolerating.   Lines: R PIV SL and fluids discontinued   Incisions/Drains/Skin: scrotal incision/flap site sutured and dressed with gauze and ABD pad. LLE ace wrapped. CMS checks & flap checks now q4 hrs checks. Pulses present with doppler, denies numbness & tingling. L & R ЕКАТЕРИНА minimal output serosangeunous drainage.   Lab:  Reviewed.   New changes this shift: MD changed dressings and educated patient for home dressing changes as well as supplies needed for dressing changes. Pt given the \"ok\" to have a BM and senna given x1 to facilitate BM.      Continue to monitor and follow POC  "

## 2019-08-31 NOTE — PROGRESS NOTES
Plastic Surgery Progress Note    Subjective/Interval History:  Unremarkable flap checks. Voiding. No BM yet.     Objective:  Temp:  [97.3  F (36.3  C)-100  F (37.8  C)] 100  F (37.8  C)  Pulse:  [91] 91  Heart Rate:  [80-90] 87  Resp:  [13-20] 18  BP: ()/(44-83) 143/83  SpO2:  [93 %-95 %] 93 %    General appearance: in NAD, resting comfortably   Pulm: Non-labored breathing; saturating well on RA  CV: Hemodynamically stable  Perineum: Flap appears pink and viable. <2sec cap refill. Dopplerable arterial signal present. Incisions intact. ЕКАТЕРИНА drain serosanguinous.  Left thigh: Donor site incision intact with staples in place, ЕКАТЕРИНА serosanguinous.     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    - Q4hr flap checks starting from 9pm tonight.   - Encourage ambulation with assistance. Continue therapies.   - Limit sitting to 5 min max. HOB < 30 degrees. Ok for 45 degrees at meal time. No hip abduction.    Casey Mendenhall, PGY5  [phone]

## 2019-09-01 ENCOUNTER — HOME CARE/HOSPICE - HEALTHEAST (OUTPATIENT)
Dept: HOME HEALTH SERVICES | Facility: HOME HEALTH | Age: 69
End: 2019-09-01

## 2019-09-01 ENCOUNTER — APPOINTMENT (OUTPATIENT)
Dept: PHYSICAL THERAPY | Facility: CLINIC | Age: 69
DRG: 578 | End: 2019-09-01
Attending: UROLOGY
Payer: MEDICARE

## 2019-09-01 VITALS
HEIGHT: 67 IN | OXYGEN SATURATION: 95 % | WEIGHT: 155.87 LBS | HEART RATE: 91 BPM | TEMPERATURE: 97.4 F | RESPIRATION RATE: 18 BRPM | SYSTOLIC BLOOD PRESSURE: 125 MMHG | BODY MASS INDEX: 24.46 KG/M2 | DIASTOLIC BLOOD PRESSURE: 75 MMHG

## 2019-09-01 LAB
ANION GAP SERPL CALCULATED.3IONS-SCNC: 6 MMOL/L (ref 3–14)
BUN SERPL-MCNC: 9 MG/DL (ref 7–30)
CALCIUM SERPL-MCNC: 8.5 MG/DL (ref 8.5–10.1)
CHLORIDE SERPL-SCNC: 110 MMOL/L (ref 94–109)
CO2 SERPL-SCNC: 26 MMOL/L (ref 20–32)
CREAT SERPL-MCNC: 0.55 MG/DL (ref 0.66–1.25)
ERYTHROCYTE [DISTWIDTH] IN BLOOD BY AUTOMATED COUNT: 12.6 % (ref 10–15)
GFR SERPL CREATININE-BSD FRML MDRD: >90 ML/MIN/{1.73_M2}
GLUCOSE SERPL-MCNC: 101 MG/DL (ref 70–99)
HCT VFR BLD AUTO: 39.1 % (ref 40–53)
HGB BLD-MCNC: 12.1 G/DL (ref 13.3–17.7)
MCH RBC QN AUTO: 27.1 PG (ref 26.5–33)
MCHC RBC AUTO-ENTMCNC: 30.9 G/DL (ref 31.5–36.5)
MCV RBC AUTO: 88 FL (ref 78–100)
PLATELET # BLD AUTO: 156 10E9/L (ref 150–450)
POTASSIUM SERPL-SCNC: 4 MMOL/L (ref 3.4–5.3)
RBC # BLD AUTO: 4.47 10E12/L (ref 4.4–5.9)
SODIUM SERPL-SCNC: 142 MMOL/L (ref 133–144)
WBC # BLD AUTO: 5.5 10E9/L (ref 4–11)

## 2019-09-01 PROCEDURE — 85027 COMPLETE CBC AUTOMATED: CPT | Performed by: STUDENT IN AN ORGANIZED HEALTH CARE EDUCATION/TRAINING PROGRAM

## 2019-09-01 PROCEDURE — 80048 BASIC METABOLIC PNL TOTAL CA: CPT | Performed by: STUDENT IN AN ORGANIZED HEALTH CARE EDUCATION/TRAINING PROGRAM

## 2019-09-01 PROCEDURE — 97116 GAIT TRAINING THERAPY: CPT | Mod: GP

## 2019-09-01 PROCEDURE — 97530 THERAPEUTIC ACTIVITIES: CPT | Mod: GP

## 2019-09-01 PROCEDURE — 25000132 ZZH RX MED GY IP 250 OP 250 PS 637: Mod: GY | Performed by: STUDENT IN AN ORGANIZED HEALTH CARE EDUCATION/TRAINING PROGRAM

## 2019-09-01 PROCEDURE — 36415 COLL VENOUS BLD VENIPUNCTURE: CPT | Performed by: STUDENT IN AN ORGANIZED HEALTH CARE EDUCATION/TRAINING PROGRAM

## 2019-09-01 PROCEDURE — 25000132 ZZH RX MED GY IP 250 OP 250 PS 637: Mod: GY | Performed by: PLASTIC SURGERY

## 2019-09-01 RX ORDER — POLYETHYLENE GLYCOL 3350 17 G/17G
1 POWDER, FOR SOLUTION ORAL DAILY
Qty: 14 PACKET | Refills: 0 | Status: SHIPPED | OUTPATIENT
Start: 2019-09-01 | End: 2020-04-21

## 2019-09-01 RX ORDER — OXYCODONE HYDROCHLORIDE 5 MG/1
5 TABLET ORAL EVERY 6 HOURS PRN
Qty: 15 TABLET | Refills: 0 | Status: SHIPPED | OUTPATIENT
Start: 2019-09-01 | End: 2020-04-21

## 2019-09-01 RX ORDER — AMOXICILLIN 250 MG
1 CAPSULE ORAL 2 TIMES DAILY
Qty: 30 TABLET | Refills: 1 | Status: SHIPPED | OUTPATIENT
Start: 2019-09-01 | End: 2020-04-21

## 2019-09-01 RX ORDER — ACETAMINOPHEN 325 MG/1
650 TABLET ORAL EVERY 4 HOURS PRN
Qty: 60 TABLET | Refills: 0 | Status: SHIPPED | OUTPATIENT
Start: 2019-09-01 | End: 2020-04-21

## 2019-09-01 RX ADMIN — ACETAMINOPHEN 650 MG: 325 TABLET, FILM COATED ORAL at 02:11

## 2019-09-01 RX ADMIN — OXYCODONE HYDROCHLORIDE 5 MG: 5 TABLET ORAL at 10:33

## 2019-09-01 RX ADMIN — ACETAMINOPHEN 650 MG: 325 TABLET, FILM COATED ORAL at 06:47

## 2019-09-01 RX ADMIN — SENNOSIDES AND DOCUSATE SODIUM 1 TABLET: 8.6; 5 TABLET ORAL at 07:32

## 2019-09-01 ASSESSMENT — ACTIVITIES OF DAILY LIVING (ADL)
ADLS_ACUITY_SCORE: 13

## 2019-09-01 NOTE — PROGRESS NOTES
Care Coordinator - Discharge Planning    Admission Date/Time:  8/27/2019  Attending MD:  Fracisco Gtz MD     Data  Chart reviewed, discussed with interdisciplinary team.   Patient was admitted for: No diagnosis found.     Assessment     Notified by provider that pt will be discharged home today and pt would benefit from home health RN and PT services.       Spoke with pt via phone.  Introduced RNCC role.  Reviewed home care options.   Pt voiced no preference for agency and would be open to referral being made to [facility].       Email referral made to [facility].  Discharge orders updated.     Coordination of Care and Referrals: Provided patient/family with options for Home Care.      Plan  Anticipated Discharge Date:  91/19  Anticipated Discharge Plan:  Home    Kimberly Frederick RN BSN, PHN RN Care Coordinator  Internal Medicine   [phone]  Pager: [phone]  Weekend RN Care Coordinator job code * * * [id number]  9/1/2019 10:26 AM

## 2019-09-01 NOTE — PLAN OF CARE
Vitals: Temp: 97.4  F (36.3  C) Temp src: Oral BP: 125/75   Heart Rate: 67 Resp: 18 SpO2: 95 % O2 Device: None (Room air)      Reason for admission: POD#3 s/p closure of perineal wound with tunneled pedicled anterolateral thigh flap   Activity:  Assist x1 for urinal use. Ambulated in murphy with PT and did very well use of walker and gait belt.   Pain:  Oxycodone given x1 prior to PT declines otherwise.   Neuro: A&O x4, able to make needs known.   Cardiac: WDL  Respiratory:  WDl, denies SOB.  GI/: Voiding adequately in urinal at bedside with one assist. +BS, +Flatus, No BM this shift.   Diet: Reg diet tolerating.   Lines: R PIV SL  Incisions/Drains/Skin: Blue doppler marking stitch removed per pt care order. Scrotal incision/flap site sutured and dressed with gauze and ABD pad. LLE ace wrapped. CMS checks & flap checks completed q4 hrs checks. Pulses present with doppler, denies numbness & tingling. L & R ЕКАТЕРИНА minimal output serosangeunous drainage.   Lab:  Reviewed.   New changes this shift: Pt possible discharge following PT evaluation this afternoon.

## 2019-09-01 NOTE — PLAN OF CARE
Vital signs:  Temp: 99.4  F (37.4  C) Temp src: Oral BP: 129/68   Heart Rate: 73 Resp: 18 SpO2: 95 % O2 Device: None (Room air)     Neuro: A&Ox4, calm, calls appropriately.   Cardiac: WNL, denies chest pain.   Respiratory: LS clear, denies SOB.   Pain: Pt reports slightly aching left thigh, prn tylenol with relief.   Diet: Regular diet, fluids encouraged, denies nausea.   GI/: Voiding adequate amount. Abdomen soft, non-tender. +BS, +flatus, no BM.   Activity: Ambulation encouraged. Standing at bedside to void with SBA. HOB < 30 degrees.   Skin: Scrotal flap appears pink, skin warm to touch, dopplerable arterial signal present. LLE ace wrapped. CMS intact. Flap checks q4 hrs done. Pulses present with doppler, denies numbness & tingling.   Drain: Perineum bilateral ЕКАТЕРИНА drains with scant drainage, strip drains x1.   New changes this shift: None, pt slept well overnight, no acute changes this shift.   Plan: Continue POC.

## 2019-09-01 NOTE — PLAN OF CARE
Discharge Planner PT  7B  Patient plan for discharge: home with assist from SO + use of 4WW for all mobility  Current status: Verbalized all post op precautions and adhered to them throughout therapy session. Progressed gait endurance and pattern with 4WW for ~ 80 ft + ~ 80 ft, SBA initially then progressed to Thelma, WBAT through L LE - improved gait pattern and speed. Improved standing tolerance. Reviewed and progressed LE HEP as tolerated. Pain well managed for today's session. Transfer to/from toilet in bathroom with 4WW, SBA. All pt and pt's SO's concerns and questions addressed. Recommend and encourage short frequent walks as tolerated with FWW - pt is safe to mobilize IND.     Barriers to return to prior living situation: medical status  Recommendations for discharge: home with assist from SO prn + HH PT/OT  Rationale for recommendations: Pt is progressing well with therapy and demonstrates that he is safe to return home when he is medically stable. Recommend use of 4WW for all mobility and HH PT/OT to evaluate and follow to continue to progress strength, balance, and endurance/activity tolerance post op.         Entered by: Aleida Leonardo 09/01/2019 11:51 AM

## 2019-09-01 NOTE — PLAN OF CARE
Vitals: Temp: 97.4  F (36.3  C) Temp src: Oral BP: 125/75   Heart Rate: 67 Resp: 18 SpO2: 95 % O2 Device: None (Room air)      Pt discharged at 1500 with hospital wheelchair transport to [facility].   Belongings: All belongings with pt and wife.   All discharge instructions and education discussed with pt and wife. All questions answered.      19-Jul-2019

## 2019-09-01 NOTE — DISCHARGE SUMMARY
Discharge Summary     Sweetie Carrero MRN# [id number]   YOB: 1950 Age: 69 year old     Date of Admission:  8/27/2019  Date of Discharge::  9/1/2019  3:14 PM  Admitting Physician:  Fracisco Gtz MD  Discharge Physician:  Fracisco Gtz MD  Primary Care Physician:         Eb Guthrie          Admission Diagnoses:   Paget's Disease          Discharge Diagnosis:   Same as above         Procedures:   Procedure(s):  8/27/2019: Wide local excision of perineal lesion (urology)  8/29/2019: Closure Of Perineal Wound With Left  Local And Regional Flaps, skin Graft Left inner thigh (plastic surgery)        Non-operative procedures:   None performed          Consultations:   PHYSICAL THERAPY ADULT IP CONSULT         Imaging Studies:     Results for orders placed or performed in visit on 07/03/19   CTA Abdomen Pelvis Bilat Leg Runoff w Contr    Narrative    Exam: Computed tomographic angiography of the abdomen, pelvis, and  bilateral lower extremities with contrast dated 7/3/2018    Clinical information: Plan for flap    Technique: Axial images obtained of the abdomen, pelvis, and lower  extremities through the feet obtained following the injection of  contrast media in the arterial phase. Source images reviewed as well  as 3D and multi-planar reconstructions.    Contrast: 135 cc Isovue 370    DLP: 1134 mGycm mGy*cm    Comparison: None .    Findings:      Abdominal aorta:     Normal caliber without significant atherosclerotic disease.    Right pelvis and lower extremity: Normal-appearing internal pudendal  artery    Common iliac artery: Normal caliber without atherosclerotic disease  External iliac artery: Normal caliber without atherosclerotic disease   Common femoral artery: Normal caliber without atherosclerotic disease   Profunda femoral artery: Normal caliber without atherosclerotic  disease  Superficial femoral artery: Normal caliber without  atherosclerotic  disease.  Popliteal artery: Normal caliber without significant atherosclerotic  disease.  Tibioperoneal trunk: Normal branching pattern. No atherosclerotic  disease.  Anterior tibial artery: Normal-appearing vessel down to the the distal  calf. Not visualized beyond this point due to timing of contrast  bolus.  Posterior: Normal-appearing vessel down to the distal calf. Not  visualized beyond this point due to timing of the contrast bolus.  Peroneal: Normal-appearing vessel down to the distal calf. Not  visualized beyond this point due to timing of the contrast bolus.    Left pelvis and lower extremity: Normal appearing internal pudendal  artery.    Common iliac artery: Minimal atherosclerotic disease without  significant narrowing of the lumen.  External iliac artery: No significant atherosclerotic disease.   Common femoral artery: Normal caliber without significant  atherosclerotic disease.   Profunda femoral artery: Normal caliber without significant  atherosclerotic disease.  Superficial femoral artery: Normal caliber without significant  atherosclerotic disease. Normal caliber without significant  atherosclerotic disease.  Popliteal artery: Normal caliber without significant atherosclerotic  disease.  Tibioperoneal trunk: Normal caliber without significant  atherosclerotic disease.  Anterior tibial artery: Normal vessel down to the ankle. Foot vessels  are not evaluated due to the for contrast timing bolus.  Posterior: Normal caliber vessel without significant atherosclerotic  disease to the ankle. Vessels not evaluated distally due to timing of  contrast bolus.  Peroneal: Normal vessel.    Abdomen:     The liver, spleen, gallbladder, pancreas adrenal glands, kidneys  appear normal. The stomach, small and colon appear normal.     No worrisome osseous lesions. Benign scattered bone islands are  identified.    Lower chest: Atelectasis left lung base. Lungs otherwise clear.  Benign-appearing cysts  "upper pole of the left kidney.      Impression    Impression:    Normal branching pattern and appearance of the aorta, pelvic and lower  extremity arteries. The very distal vessels in the calf and feet are  not well visualized due to poor timing of the contrast bolus.    LION GOLD MD            Medications Prior to Admission:     No medications prior to admission.            Discharge Medications:     Discharge Medication List as of 9/1/2019  2:21 PM      START taking these medications    Details   acetaminophen (TYLENOL) 325 MG tablet Take 2 tablets (650 mg) by mouth every 4 hours as needed for other (multimodal surgical pain management along with NSAIDS and opioid medication as indicated based on pain control and physical function.), Disp-60 tablet, R-0, Local Print      polyethylene glycol (MIRALAX/GLYCOLAX) packet Take 17 g by mouth daily Take while taking oxycodone to prevent constipation., Disp-14 packet, R-0, E-Prescribe      senna-docusate (SENOKOT-S/PERICOLACE) 8.6-50 MG tablet Take 1 tablet by mouth 2 times daily Take twice daily while taking oxycodone to prevent constipation, Disp-30 tablet, R-1, E-Prescribe         CONTINUE these medications which have CHANGED    Details   oxyCODONE (ROXICODONE) 5 MG tablet Take 1 tablet (5 mg) by mouth every 6 hours as needed for severe pain, Disp-15 tablet, R-0, Local Print         CONTINUE these medications which have NOT CHANGED    Details   fish oil-omega-3 fatty acids 1000 MG capsule Take 2 g by mouth daily, Historical      lisinopril (PRINIVIL/ZESTRIL) 40 MG tablet Take 40 mg by mouth daily, Historical      mupirocin (BACTROBAN) 2 % ointment Apply topically 3 times dailyHistorical      pravastatin (PRAVACHOL) 20 MG tablet TAKE ONE TABLET BY MOUTH ONCE DAILY, Historical         STOP taking these medications       aspirin (ASA) 81 MG EC tablet Comments:   Reason for Stopping:                      Brief History of Illness:   \"Sweetie Carrero is a 69 year old-year-old " "male with history of extramammary Paget's Disease of the scrotum and perineum. He has undergone several resections most recently in Feb 2019 when he underwent scrotectomy. The posterior margin was ultimately positive despite negative frozen section intraoperatively. He is here for repeat resection. The risks and benefits were discussed including but not limited to bleeding, infection, pain/numbness, need for open wound with dressing changes for a few days until pathology is finalized, and need for additional procedures. He agreed to proceed.\"           Hospital Course:   He underwent wide local excision on 8/27/2019 with urology. Pathology was expedited and did not reveal any evidence of malignancy so plastic surgery went ahead with perineal wound closure via tunneled pedicled ALT flap on 8/29/2019. Both surgeries were unremarkable, as was his hospital course.     On 9/1/2019 patient was ambulating without assistance, taking small steps to avoid graft tension, tolerating the discharge diet, had pain controlled with PO medications to go home with, and requiring no IV medications or fluids. Patient was discharged home with appropriate contact information, follow-up and instructions as seen below in the discharge paperwork.         Final Pathology Result:   Pending at time of discharge         Discharge Instructions and Follow-Up:     Discharge Procedure Orders   Home Care PT Referral for Hospital Discharge   Referral Priority: Routine Referral Type: Home Health Therapies & Aides   Number of Visits Requested: 1     Plastic Surgery Referral     MD face to face encounter   Order Comments: Documentation of Face to Face and Certification for Home Health Services    I certify that patient: Sweetie Carrero is under my care and that I, or a nurse practitioner or physician's assistant working with me, had a face-to-face encounter that meets the physician face-to-face encounter requirements with this patient on: 9/1/2019.    This " encounter with the patient was in whole, or in part, for the following medical condition, which is the primary reason for home health care: POD#5 s/p wide local exicision for extramammary Paget's disease of scrotum and perineum. Also POD#3 s/p closure of perineal wound with tunneled pedicled anterolateral thigh flap. .    I certify that, based on my findings, the following services are medically necessary home health services: Nursing and Physical Therapy.    My clinical findings support the need for the above services because: Nurse is needed: To assess after changes in medications or other medical regimen.. and Physical Therapy Services are needed to assess and treat the following functional impairments: endurance, balanace, strength and endurance.    Further, I certify that my clinical findings support that this patient is homebound (i.e. absences from home require considerable and taxing effort and are for medical reasons or Baptism services or infrequently or of short duration when for other reasons) because: Requires assistance of another person or specialized equipment to access medical services because patient: Requires supervision of another for safe transfer...    Based on the above findings. I certify that this patient is confined to the home and needs intermittent skilled nursing care, physical therapy and/or speech therapy.  The patient is under my care, and I have initiated the establishment of the plan of care.  This patient will be followed by a physician who will periodically review the plan of care.  Physician/Provider to provide follow up care: Eb Guthrie    Attending hospital physician (the Medicare certified [facility] provider): Violeta Barrett MD,  Physician Signature: See electronic signature associated with these discharge orders.  Date: 9/1/2019     Discharge Instructions   Order Comments: Plastic Surgery Instructions:  Limit sitting to maximum of 5 min.   Ok to have some pillows behind your  head to elevate your head when eating.   Avoid extreme extension and flexion of your thighs in order to protect your incisions. Avoid spreading your thighs open.   Make sure to walk around multiple times/day. Take small steps.   Ok to shower and let water gently run over your incisions. No scrubbing. No submerging in water/baths. Pat dry your incisions. You may wrap your thigh with an ACE wrap for comfort.   Strip your drains 3 times/day and record the daily output volume separately for each drain. Bring the log of output volumes to clinic.   Make an appointment to see Dr. Myles in 1 week.     Reason for your hospital stay   Order Comments: Perineal surgery (excision and flap)     Follow Up and recommended labs and tests   Order Comments: Activity  - Please see specific instructions from plastic surgery regarding activity and wound care instructions  - You will have home nursing visits set up to assist with dressing changes    Medications  - Transition from narcotic pain medications to tylenol (acetaminophen) as you are able.  Wean yourself off all pain medications as you are able.  - Some pain medications contain both tylenol (acetaminophen) and a narcotic (Norco, vicodin, percocet), do not take more than 4,000mg of Tylenol (acetaminophen) from all sources in any 24 hour period.  - Narcotics can make you constipated.  Take over the counter fiber (metamucil or benefiber) and stool softeners (miralax, docusate or senna) while taking narcotic pain medications, but stop if you develop diarrhea.  - No driving or operating machinery while taking narcotic pain medications     Follow-Up:  - Follow up with plastic surgery as per their instructions  - Call your primary care provider to touch base regarding your recent admission.    - Call or return sooner than your regularly scheduled visit if you develop any of the following: fever (greater than 101.5), uncontrolled pain, uncontrolled nausea or vomiting, as well as increased  "redness, swelling, or drainage from your wound.     Phone numbers:   - Monday through Friday 8am to 4:30pm: Call [phone] with questions or to schedule or confirm appointment.    - Nights or weekends: call the after hours emergency pager - [phone] and tell the  \"I would like to page the Urology Resident on call.\"  - For emergencies, call 911     Activity   Order Comments: Please see specific activity instructions from plastic surgery     Order Specific Question Answer Comments   Is discharge order? Yes      Full Code     Order Specific Question Answer Comments   Code status determined by: Unable to discuss and no AD/POLST on file; continue PREVIOUSLY ORDERED code status      Diet   Order Comments: Follow this diet upon discharge: Regular adult diet     Order Specific Question Answer Comments   Is discharge order? Yes             Discharge Disposition:     Discharged to Home with home nursing care.      Condition at discharge: Good    --    Anibal Rios MD  Urology Resident    5:19 PM, 9/1/2019    "

## 2019-09-01 NOTE — PROGRESS NOTES
"Urology  Progress Note  09/01/2019    - No acute overnight events  - Tolerating regular diet   - Urinating well since fuentes out  - Pain tolerable at rest, worse with ambulation    Exam  /75 (BP Location: Right arm)   Pulse 91   Temp 97.4  F (36.3  C) (Oral)   Resp 18   Ht 1.702 m (5' 7\")   Wt 70.7 kg (155 lb 13.8 oz)   SpO2 95%   BMI 24.41 kg/m    No acute distress  Unlabored breathing  Abdomen soft, nontender, nondistended.  Perineal flap appears viable, incisions c/d/i. No bleeding.  Left thigh site w/ dressing, minimally saturated, closed with staples    I/O's (last 24/since midnight)  UOP 5915/800  ЕКАТЕРИНА LLE 25/10  ЕКАТЕРИНА perineum 40/15    Labs  WBC 5.5 (7.6)  Hb 12.1 (12.0)  Cr 0.55 (0.64)    Assessment/Plan  69 year old male POD#5 s/p wide local exicision for extramammary Paget's disease of scrotum and perineum. Also POD#3 s/p closure of perineal wound with tunneled pedicled anterolateral thigh flap.    Neuro: Sched tylenol, PRN IV dilaudid and oxycodone for pain control. CMS checks Q2H  CV: Required bolus overnight, currently HDS   Pulm: incentive spirometry while awake  FEN/GI: Regular diet  Endo: PRISCILA.  : PRISCILA. Fuentes out 8/30.  Heme/ID: Hgb pending. Ronel-op abx complete. Monitor for fevers and leukocytosis.  Activity: Per plastics, Elevate LLE on foam wedge, strip drains Q8, okay to ambulate. Limit sitting to 5 min max. HOB <30'. Ok for 45' for meal time. No hip abduction, no large steps. May require PT.  Wound: Q4H flap checks, ЕКАТЕРИНА drain management per plastics as above.  PPx: SCDs.  Dispo: Home likely today vs. Tomorrow with home nursing.    Seen and examined with the chief resident. Will discuss with Dr. Gtz.    Anibal Rios MD  Urology Resident     Contacting the Urology Team     Please use the following job codes to reach the Urology Team. Note that you must use an in house phone and that job codes cannot receive text pages.     On weekdays, dial [phone] (or star-star-star [phone] on the new " [employer] telephones) then [phone] to reach the Adult Urology resident or PA on call    On weekdays, dial [phone] (or star-star-star [phone] on the new [employer] telephones) then [phone] to reach the Pediatric Urology resident    On weeknights and weekends, dial [phone] (or star-star-star [phone] on the new [employer] telephones) then [phone] to reach the Urology resident on call (for both Adult and Pediatrics)

## 2019-09-01 NOTE — PLAN OF CARE
Temp: 99.4  F (37.4  C) Temp src: Oral BP: 129/68   Heart Rate: 73 Resp: 18 SpO2: 95 % O2 Device: None (Room air)       Neuro: alert, oriented  GI/:  voiding adequately per urinal. On stool softeners  Incisions/Drains: LLE ace wrapped, incision covered. Incisions to scrotum ALMA. Pulses to L foot and scrotum with strong dopplered pulse every 4 hours.  IV Access: PIV SL  Labs: reviewed  Pain: prn tylenol  Diet:regular, reports good appetite  Activity: up with SBA  New changes this shift:none   Plan:  possible discharge home, monitoring low grade temps.

## 2019-09-01 NOTE — PROGRESS NOTES
Plastic Surgery Progress Note    Subjective/Interval History:  Doing well.     Objective:  Temp:  [97.4  F (36.3  C)-100.2  F (37.9  C)] 97.4  F (36.3  C)  Heart Rate:  [67-89] 67  Resp:  [18] 18  BP: (125-132)/(66-75) 125/75  SpO2:  [95 %] 95 %    General appearance: in NAD, resting comfortably   Pulm: Non-labored breathing; saturating well on RA  CV: Hemodynamically stable  Perineum: Flap appears pink and viable. <2sec cap refill. Dopplerable arterial signal present. Incisions intact. ЕКАТЕРИНА drain serosanguinous.  Left thigh: Donor site incision intact with staples in place, ЕКАТЕРИНА serosanguinous.     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    - Ok to discharge from Plastic Surgery standpoint.   - Q4hr flap checks until the time of discharge.   - Encourage ambulation.   - Limit sitting to 5 min max. HOB < 30 degrees. Ok for 45 degrees at meal time. No hip abduction.  - Will update discharge navigator with instructions and outpatient follow-up referral.     Casey Mendenhall, PGY5  [phone]

## 2019-09-02 ENCOUNTER — PATIENT OUTREACH (OUTPATIENT)
Dept: CARE COORDINATION | Facility: CLINIC | Age: 69
End: 2019-09-02

## 2019-09-02 NOTE — PROGRESS NOTES
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 He has undergone several resections most recently in Feb 2019 when he underwent scrotectomy. The posterior margin was ultimately positive despite negative frozen section intraoperatively. He is here for repeat resection. The risks and benefits were discussed including but not limited to bleeding, infection, pain/numbness, need for open wound with dressing changes for a few days until pathology is finalized, and need for additional procedures. He agreed to proceed.\"    ASSESSMENT      Discharge Assessment  Patient reports symptoms are: Unchanged  Does the patient have all of their medications?: Yes  Does patient know what their new medications are for?: Yes  Does patient have a follow-up appointment scheduled?: No  Does patient have any other questions or concerns?: No    Post-op  Did the patient have surgery or a procedure: Yes  Fever: No  Chills: No  Eating & Drinking: eating and drinking without complaints/concerns  PO Intake: regular diet  Bowel Function: normal  Urinary Status: voiding without complaint/concerns    PLAN                                                      Outpatient Plan:      - Follow up with plastic surgery as per their instructions    No future appointments.        Erna Sosa, [facility]                  "

## 2019-09-03 ENCOUNTER — HOME CARE/HOSPICE - HEALTHEAST (OUTPATIENT)
Dept: HOME HEALTH SERVICES | Facility: HOME HEALTH | Age: 69
End: 2019-09-03

## 2019-09-03 ENCOUNTER — TELEPHONE (OUTPATIENT)
Dept: UROLOGY | Facility: CLINIC | Age: 69
End: 2019-09-03

## 2019-09-03 NOTE — PROGRESS NOTES
[facility]  Referral received via Care Transitions team for home care services. Patient's service address is in our [facility] Care service area. Patient will receive services from [facility]. All patient demographics and orders will be sent to [facility]. Care team notified. For any questions or concerns regarding home care services please call [phone].

## 2019-09-03 NOTE — TELEPHONE ENCOUNTER
Urology Postop Phone Note:    Mr. Sweetie Carrero is a 69 year old male who underwent Wide local excision of perineal lesion on 8/27/19 with Dr. Gtz for a history of Extramammary Paget's Disease.  His postoperative course was unremarkable and the patient was d/c to home on 9/1/19.      Fevers/chills: Patient denies any fever or chills.  Eating/drinking: Patient is able to eat and drink without any complaints.  Bowel habits: Patient reports having a normal bowel movement.  Urine output via urethra Color Yellow Clarity Clear  Odor None  Incisions: Patient denies any signs and symptoms of infection.  Pain: Patient reports pain as a 2 out of 10.  The pain is located at incision site.   Narcotics: The patient has been taking Tylenol for pain medication and is able to control the pain.  Antibiotics: Yes    Follow up appointment scheduled on 9/17/19 at 1300 with Dr. Myles.    Informed the patient of the clinic triage nursing # ([phone]) and urology resident on call # for after hours concerns.    Brenda Coleman, RN, BSN  Care Coordinator - Reconstructive Urology

## 2019-09-04 ENCOUNTER — PATIENT OUTREACH (OUTPATIENT)
Dept: PLASTIC SURGERY | Facility: CLINIC | Age: 69
End: 2019-09-04

## 2019-09-04 NOTE — PATIENT INSTRUCTIONS
Spoke with pt regarding drain removal. Pt confirms drain output has been less than 20ml for a few days. Explained that Dr. Myles will see the pt for a follow up appointment on 9/17/19 at 1pm. However, pt can be seen for a nurse visit to have a drain removal. Pt states understanding. Pt scheduled for 9/5/19 at 12pm. Pt confirms appt date, time, and location. Shaina VEE RNCC

## 2019-09-05 ENCOUNTER — APPOINTMENT (OUTPATIENT)
Dept: SURGERY | Facility: CLINIC | Age: 69
End: 2019-09-05
Payer: MEDICARE

## 2019-09-05 ENCOUNTER — HOME CARE/HOSPICE - HEALTHEAST (OUTPATIENT)
Dept: HOME HEALTH SERVICES | Facility: HOME HEALTH | Age: 69
End: 2019-09-05

## 2019-09-06 ENCOUNTER — HOME CARE/HOSPICE - HEALTHEAST (OUTPATIENT)
Dept: HOME HEALTH SERVICES | Facility: HOME HEALTH | Age: 69
End: 2019-09-06

## 2019-09-07 ENCOUNTER — HOME CARE/HOSPICE - HEALTHEAST (OUTPATIENT)
Dept: HOME HEALTH SERVICES | Facility: HOME HEALTH | Age: 69
End: 2019-09-07

## 2019-09-09 ENCOUNTER — HOME CARE/HOSPICE - HEALTHEAST (OUTPATIENT)
Dept: HOME HEALTH SERVICES | Facility: HOME HEALTH | Age: 69
End: 2019-09-09

## 2019-09-10 ENCOUNTER — HOME CARE/HOSPICE - HEALTHEAST (OUTPATIENT)
Dept: HOME HEALTH SERVICES | Facility: HOME HEALTH | Age: 69
End: 2019-09-10

## 2019-09-12 ENCOUNTER — HOME CARE/HOSPICE - HEALTHEAST (OUTPATIENT)
Dept: HOME HEALTH SERVICES | Facility: HOME HEALTH | Age: 69
End: 2019-09-12

## 2019-09-13 ENCOUNTER — HOME CARE/HOSPICE - HEALTHEAST (OUTPATIENT)
Dept: HOME HEALTH SERVICES | Facility: HOME HEALTH | Age: 69
End: 2019-09-13

## 2019-09-16 ENCOUNTER — HOME CARE/HOSPICE - HEALTHEAST (OUTPATIENT)
Dept: HOME HEALTH SERVICES | Facility: HOME HEALTH | Age: 69
End: 2019-09-16

## 2019-09-17 ENCOUNTER — OFFICE VISIT (OUTPATIENT)
Dept: PLASTIC SURGERY | Facility: CLINIC | Age: 69
End: 2019-09-17
Payer: MEDICARE

## 2019-09-17 ENCOUNTER — HOME CARE/HOSPICE - HEALTHEAST (OUTPATIENT)
Dept: HOME HEALTH SERVICES | Facility: HOME HEALTH | Age: 69
End: 2019-09-17

## 2019-09-17 VITALS
DIASTOLIC BLOOD PRESSURE: 71 MMHG | HEIGHT: 67 IN | BODY MASS INDEX: 24.41 KG/M2 | HEART RATE: 78 BPM | SYSTOLIC BLOOD PRESSURE: 124 MMHG | OXYGEN SATURATION: 96 %

## 2019-09-17 DIAGNOSIS — C44.99 PAGET DISEASE, EXTRA MAMMARY: Primary | ICD-10-CM

## 2019-09-17 ASSESSMENT — PAIN SCALES - GENERAL: PAINLEVEL: MILD PAIN (3)

## 2019-09-17 NOTE — NURSING NOTE
"Chief Complaint   Patient presents with     Surgical Followup     post op perineal wound closure, DOS 8/27       Vitals:    09/17/19 1301   BP: 124/71   BP Location: Right arm   Patient Position: Chair   Cuff Size: Adult Regular   Pulse: 78   SpO2: 96%   Height: 1.702 m (5' 7\")       Body mass index is 24.41 kg/m .    BRAD Rojas    "

## 2019-09-17 NOTE — PROGRESS NOTES
"Patient returns for a postoperative follow-up after undergoing coverage of perineal wound with left pedicled anterolateral thigh flap.    INTERVAL HISTORY: Patient reports minimal pain.  There is some drainage along the perineum.  Denies any numbness, weakness or issues with the left lower extremity.  All drains have been removed.    PHYSICAL EXAMINATION:  /71 (BP Location: Right arm, Patient Position: Chair, Cuff Size: Adult Regular)   Pulse 78   Ht 1.702 m (5' 7\")   SpO2 96%   BMI 24.41 kg/m    On examination of the perineum, flap incisions are all healed.  The flap is pink and well-perfused.  There is a small area of wound dehiscence along the left side where a little bit of serosanguineous drainage is noticed.  There is no surrounding erythema.  There is no purulence.  On examination of the left thigh, staples and incision tape are intact.  There is no erythema or signs of infection.  However, there is some residual tightness along the anterior thigh.  A palpable dorsalis pedis pulses intact along the anterior ankle.  Sensation to the dorsum of the foot, between the great and second toe, and along the sole of the foot are intact.  Patient is able to wiggle the toes and flex and extend the ankle.    ASSESSMENT: 3 weeks status post coverage of perineal Paget's disease wound with pedicled left anterolateral thigh flap.    PLAN: I encouraged daily showers with clean the groin creases.  Patient is to continue with mesh panties and pads to catch the drainage.  I suspect this will heal within the next 1 to 2 weeks.  Only the superior portion of thigh incision staples and incision tape are removed today.  We will keep the remaining staples and incision tape in.  Patient is to return to see me next week for removal of these.  Patient may bend the knee.    Total time spent with patient was 15 min of which greater than 50% was in counseling.  "

## 2019-09-17 NOTE — LETTER
"9/17/2019       RE: Sweetie Carrero  [address] [facility] [address]     Dear Colleague,    Thank you for referring your patient, Sweetie Carrero, to the [facility] PLASTIC AND RECONSTRUCTIVE SURGERY at [facility]. Please see a copy of my visit note below.    Patient returns for a postoperative follow-up after undergoing coverage of perineal wound with left pedicled anterolateral thigh flap.    INTERVAL HISTORY: Patient reports minimal pain.  There is some drainage along the perineum.  Denies any numbness, weakness or issues with the left lower extremity.  All drains have been removed.    PHYSICAL EXAMINATION:  /71 (BP Location: Right arm, Patient Position: Chair, Cuff Size: Adult Regular)   Pulse 78   Ht 1.702 m (5' 7\")   SpO2 96%   BMI 24.41 kg/m     On examination of the perineum, flap incisions are all healed.  The flap is pink and well-perfused.  There is a small area of wound dehiscence along the left side where a little bit of serosanguineous drainage is noticed.  There is no surrounding erythema.  There is no purulence.  On examination of the left thigh, staples and incision tape are intact.  There is no erythema or signs of infection.  However, there is some residual tightness along the anterior thigh.  A palpable dorsalis pedis pulses intact along the anterior ankle.  Sensation to the dorsum of the foot, between the great and second toe, and along the sole of the foot are intact.  Patient is able to wiggle the toes and flex and extend the ankle.    ASSESSMENT: 3 weeks status post coverage of perineal Paget's disease wound with pedicled left anterolateral thigh flap.    PLAN: I encouraged daily showers with clean the groin creases.  Patient is to continue with mesh panties and pads to catch the drainage.  I suspect this will heal within the next 1 to 2 weeks.  Only the superior portion of thigh incision staples and incision tape are removed today.  We will " keep the remaining staples and incision tape in.  Patient is to return to see me next week for removal of these.  Patient may bend the knee.    Total time spent with patient was 15 min of which greater than 50% was in counseling.    Again, thank you for allowing me to participate in the care of your patient.      Sincerely,    Aditya Myles MD

## 2019-09-20 ENCOUNTER — HOME CARE/HOSPICE - HEALTHEAST (OUTPATIENT)
Dept: HOME HEALTH SERVICES | Facility: HOME HEALTH | Age: 69
End: 2019-09-20

## 2019-09-24 ENCOUNTER — OFFICE VISIT (OUTPATIENT)
Dept: PLASTIC SURGERY | Facility: CLINIC | Age: 69
End: 2019-09-24
Payer: MEDICARE

## 2019-09-24 VITALS
TEMPERATURE: 98.3 F | BODY MASS INDEX: 24.41 KG/M2 | SYSTOLIC BLOOD PRESSURE: 116 MMHG | OXYGEN SATURATION: 95 % | HEIGHT: 67 IN | DIASTOLIC BLOOD PRESSURE: 64 MMHG | HEART RATE: 80 BPM

## 2019-09-24 DIAGNOSIS — C63.2 EXTRAMAMMARY PAGET'S DISEASE OF SCROTUM (H): Primary | ICD-10-CM

## 2019-09-24 ASSESSMENT — PAIN SCALES - GENERAL: PAINLEVEL: MILD PAIN (3)

## 2019-09-24 NOTE — PROGRESS NOTES
"Patient returns for a postoperative follow-up after undergoing coverage of perineal wound with left pedicled anterolateral thigh flap.    INTERVAL HISTORY: Patient reports some discomfort when sitting.  There is some drainage intermittently.  Otherwise, doing well.    PHYSICAL EXAMINATION:  /64 (BP Location: Left arm, Patient Position: Chair, Cuff Size: Adult Regular)   Pulse 80   Temp 98.3  F (36.8  C) (Oral)   Ht 1.702 m (5' 7\")   SpO2 95%   BMI 24.41 kg/m    On examination of the perineum, flap incisions are all healed.  The flap is pink and well-perfused.  There is no wound dehiscence.  On examination of the left thigh, incision is fully healed.    ASSESSMENT: 4 weeks status post coverage of perineal Paget's disease wound with pedicled left anterolateral thigh flap.    PLAN: Patient has completely healed.  Staples are all removed.  I have no activity restrictions for him.  He may use a pad for drainage as needed.  I will see him back for 1 year postop follow-up or sooner if there is a problem.    Total time spent with patient was 15 min of which greater than 50% was in counseling.  "

## 2019-09-24 NOTE — LETTER
"9/24/2019       RE: Sweetie Carrero  [address] [facility] [address]     Dear Colleague,    Thank you for referring your patient, Sweetie Carrero, to the [facility] PLASTIC AND RECONSTRUCTIVE SURGERY at [facility]. Please see a copy of my visit note below.    Patient returns for a postoperative follow-up after undergoing coverage of perineal wound with left pedicled anterolateral thigh flap.    INTERVAL HISTORY: Patient reports some discomfort when sitting.  There is some drainage intermittently.  Otherwise, doing well.    PHYSICAL EXAMINATION:  /64 (BP Location: Left arm, Patient Position: Chair, Cuff Size: Adult Regular)   Pulse 80   Temp 98.3  F (36.8  C) (Oral)   Ht 1.702 m (5' 7\")   SpO2 95%   BMI 24.41 kg/m     On examination of the perineum, flap incisions are all healed.  The flap is pink and well-perfused.  There is no wound dehiscence.  On examination of the left thigh, incision is fully healed.    ASSESSMENT: 4 weeks status post coverage of perineal Paget's disease wound with pedicled left anterolateral thigh flap.    PLAN: Patient has completely healed.  Staples are all removed.  I have no activity restrictions for him.  He may use a pad for drainage as needed.  I will see him back for 1 year postop follow-up or sooner if there is a problem.    Total time spent with patient was 15 min of which greater than 50% was in counseling.    Again, thank you for allowing me to participate in the care of your patient.      Sincerely,    Aditya Myles MD  "

## 2019-09-24 NOTE — NURSING NOTE
"Chief Complaint   Patient presents with     RECHECK     1wk f/u ? staple removal       Vitals:    09/24/19 1442   BP: 116/64   BP Location: Left arm   Patient Position: Chair   Cuff Size: Adult Regular   Pulse: 80   Temp: 98.3  F (36.8  C)   TempSrc: Oral   SpO2: 95%   Height: 1.702 m (5' 7\")       Body mass index is 24.41 kg/m .    Chip Vinson, EMT    "

## 2019-09-30 ENCOUNTER — HEALTH MAINTENANCE LETTER (OUTPATIENT)
Age: 69
End: 2019-09-30

## 2020-02-28 ENCOUNTER — PATIENT OUTREACH (OUTPATIENT)
Dept: PLASTIC SURGERY | Facility: CLINIC | Age: 70
End: 2020-02-28

## 2020-02-28 NOTE — PATIENT INSTRUCTIONS
"Spoke with pt regarding follow up plan. Pt states he would like to be seen earlier than 1 year follow up because he still feels that he is not moving \"strong\". He feels that he should be stronger and able to walk with more strength. Pt scheduled for follow up on 4/21/20 at 1:45pm. Pt confirms appt date, time, and location. Shaina VEE RNCC    "

## 2020-03-15 ENCOUNTER — HEALTH MAINTENANCE LETTER (OUTPATIENT)
Age: 70
End: 2020-03-15

## 2020-04-08 ENCOUNTER — PATIENT OUTREACH (OUTPATIENT)
Dept: PLASTIC SURGERY | Facility: CLINIC | Age: 70
End: 2020-04-08

## 2020-04-08 NOTE — PATIENT INSTRUCTIONS
Spoke with pt regarding upcoming appt with Dr. Myles. Explained that due to COVID-19 we are unable to see pt for in person visit. Pt states he is doing much better, but would still like to speak with Dr. Myles. Explained that visit can be converted into a video consult. Pt is agreeable to this plan. Pt understands he will receive a link with instructions and prefers to be contacted via email. Shaina VEE RNCC

## 2020-04-21 ENCOUNTER — VIRTUAL VISIT (OUTPATIENT)
Dept: PLASTIC SURGERY | Facility: CLINIC | Age: 70
End: 2020-04-21
Payer: MEDICARE

## 2020-04-21 DIAGNOSIS — C63.2 EXTRAMAMMARY PAGET'S DISEASE OF SCROTUM (H): Primary | ICD-10-CM

## 2020-04-21 RX ORDER — HYDROCHLOROTHIAZIDE 12.5 MG/1
12.5 TABLET ORAL
COMMUNITY
Start: 2020-03-10 | End: 2021-03-10

## 2020-04-21 RX ORDER — INDOMETHACIN 50 MG/1
50 CAPSULE ORAL
COMMUNITY
Start: 2020-03-10

## 2020-04-21 ASSESSMENT — PAIN SCALES - GENERAL: PAINLEVEL: NO PAIN (0)

## 2020-04-21 NOTE — PROGRESS NOTES
Patient was met virtually for a follow-up visit regarding flap coverage of his perineal Paget's disease wound.    INTERVAL HISTORY: Patient reports his flap and leg have healed well.  The flap at the perineum did not shrink any further.  It continues to be the size of his scrotum.  He states that there is not pain, but discomfort with sitting for prolonged periods of time.  He also has tightness and tenderness along the left knee from where the flap was taken from.  He would like to know if everything appears to be healing appropriately.    PHYSICAL EXAMINATION:  Physical examination could not be performed.  However, patient was able to send in photos.  These were reviewed.  The flap appears well healed without any sign of underlying drainable fluid collection, infection, or recurrence of lesions.    ASSESSMENT: 8 months status post coverage of perineal Paget's disease wound with pedicled left anterolateral thigh flap.    PLAN: We discussed her options, including doing nothing.  I explained to the patient that we could potentially consider debulking of flap, but that there would be risks associated with the surgery.  Patient is not interested in undergoing surgery at this time.  He would like to continue monitoring the discomfort for now.  In terms of his knee tightness, I recommended physical therapy.  Patient would like to wait until this COVID crisis has passed before committing to therapy.  He will follow-up with us as needed.    Total time spent with patient was 15 min of which greater than 50% was in counseling.

## 2020-04-21 NOTE — NURSING NOTE
Chief Complaint   Patient presents with     RECHECK       There were no vitals filed for this visit.    There is no height or weight on file to calculate BMI.    Chip Vinson, EMT

## 2021-01-15 ENCOUNTER — HEALTH MAINTENANCE LETTER (OUTPATIENT)
Age: 71
End: 2021-01-15

## 2021-05-09 ENCOUNTER — HEALTH MAINTENANCE LETTER (OUTPATIENT)
Age: 71
End: 2021-05-09

## 2021-05-25 ENCOUNTER — RECORDS - HEALTHEAST (OUTPATIENT)
Dept: ADMINISTRATIVE | Facility: CLINIC | Age: 71
End: 2021-05-25

## 2021-07-20 VITALS
RESPIRATION RATE: 16 BRPM | OXYGEN SATURATION: 96 % | RESPIRATION RATE: 16 BRPM | DIASTOLIC BLOOD PRESSURE: 80 MMHG | HEART RATE: 77 BPM | HEART RATE: 77 BPM | TEMPERATURE: 98.8 F | OXYGEN SATURATION: 98 % | HEART RATE: 68 BPM | OXYGEN SATURATION: 97 % | SYSTOLIC BLOOD PRESSURE: 115 MMHG | SYSTOLIC BLOOD PRESSURE: 114 MMHG | DIASTOLIC BLOOD PRESSURE: 77 MMHG | HEART RATE: 63 BPM | SYSTOLIC BLOOD PRESSURE: 150 MMHG | SYSTOLIC BLOOD PRESSURE: 140 MMHG | DIASTOLIC BLOOD PRESSURE: 78 MMHG | OXYGEN SATURATION: 97 % | DIASTOLIC BLOOD PRESSURE: 70 MMHG | DIASTOLIC BLOOD PRESSURE: 86 MMHG | HEART RATE: 78 BPM | SYSTOLIC BLOOD PRESSURE: 124 MMHG | HEART RATE: 72 BPM | TEMPERATURE: 98.1 F | SYSTOLIC BLOOD PRESSURE: 130 MMHG | SYSTOLIC BLOOD PRESSURE: 130 MMHG | DIASTOLIC BLOOD PRESSURE: 76 MMHG | TEMPERATURE: 99 F | OXYGEN SATURATION: 97 % | OXYGEN SATURATION: 99 % | TEMPERATURE: 98.7 F | SYSTOLIC BLOOD PRESSURE: 118 MMHG | DIASTOLIC BLOOD PRESSURE: 76 MMHG | DIASTOLIC BLOOD PRESSURE: 71 MMHG | OXYGEN SATURATION: 97 % | HEART RATE: 70 BPM | SYSTOLIC BLOOD PRESSURE: 130 MMHG | HEART RATE: 75 BPM | OXYGEN SATURATION: 96 % | DIASTOLIC BLOOD PRESSURE: 80 MMHG | HEART RATE: 70 BPM | TEMPERATURE: 99.5 F | TEMPERATURE: 98.9 F | TEMPERATURE: 98.2 F | HEART RATE: 71 BPM | DIASTOLIC BLOOD PRESSURE: 60 MMHG | SYSTOLIC BLOOD PRESSURE: 119 MMHG

## 2021-08-29 ENCOUNTER — HEALTH MAINTENANCE LETTER (OUTPATIENT)
Age: 71
End: 2021-08-29

## 2021-10-24 ENCOUNTER — HEALTH MAINTENANCE LETTER (OUTPATIENT)
Age: 71
End: 2021-10-24

## 2021-11-16 NOTE — TELEPHONE ENCOUNTER
Health Call Center    Phone Message    May a detailed message be left on voicemail: yes    Reason for Call: Requesting Results   Name/type of test: biopsy w/ Dr URIEL VIRK    Date of test: 5/24/2019  Was test done at a location other than [facility] (Please fill in the location if not [facility])?: No      Action Taken: Message routed to:  [facility] COLON RECTAL  
Called patient, inform patient results is still pending, will call him when results are finalize.    Emil Guevara LPN    
Pt notified of biopsy results showing no pagets disease. Will check with Dr. Owen if he needs to f/u with us. Pt states understanding.    Emil Guevara LPN    
2018

## 2022-04-10 ENCOUNTER — HEALTH MAINTENANCE LETTER (OUTPATIENT)
Age: 72
End: 2022-04-10

## 2022-06-05 ENCOUNTER — HEALTH MAINTENANCE LETTER (OUTPATIENT)
Age: 72
End: 2022-06-05

## 2022-10-15 ENCOUNTER — HEALTH MAINTENANCE LETTER (OUTPATIENT)
Age: 72
End: 2022-10-15

## 2023-01-26 NOTE — ED AVS SNAPSHOT
[facility], [place], Emergency Department  [address] [place] [id number]  Phone:  [phone]                                    Sweetie Carrero   MRN: [id number]    Department:  [facility], Emergency Department   Date of Visit:  3/17/2019           After Visit Summary Signature Page    I have received my discharge instructions, and my questions have been answered. I have discussed any challenges I see with this plan with the nurse or doctor.    ..........................................................................................................................................  Patient/Patient Representative Signature      ..........................................................................................................................................  Patient Representative Print Name and Relationship to Patient    ..................................................               ................................................  Date                                   Time    ..........................................................................................................................................  Reviewed by Signature/Title    ...................................................              ..............................................  Date                                               Time          22EPIC Rev 08/18        Rituxan Pregnancy And Lactation Text: This medication is Pregnancy Category C and it isn't know if it is safe during pregnancy. It is unknown if this medication is excreted in breast milk but similar antibodies are known to be excreted.

## 2023-06-01 ENCOUNTER — HEALTH MAINTENANCE LETTER (OUTPATIENT)
Age: 73
End: 2023-06-01

## 2023-06-11 ENCOUNTER — HEALTH MAINTENANCE LETTER (OUTPATIENT)
Age: 73
End: 2023-06-11

## 2024-01-07 ENCOUNTER — HEALTH MAINTENANCE LETTER (OUTPATIENT)
Age: 74
End: 2024-01-07

## 2024-08-15 ENCOUNTER — APPOINTMENT (OUTPATIENT)
Dept: RADIOLOGY | Facility: HOSPITAL | Age: 74
End: 2024-08-15
Attending: STUDENT IN AN ORGANIZED HEALTH CARE EDUCATION/TRAINING PROGRAM
Payer: MEDICARE

## 2024-08-15 ENCOUNTER — HOSPITAL ENCOUNTER (EMERGENCY)
Facility: HOSPITAL | Age: 74
Discharge: HOME | End: 2024-08-15
Admitting: EMERGENCY MEDICINE
Payer: MEDICARE

## 2024-08-15 VITALS
SYSTOLIC BLOOD PRESSURE: 158 MMHG | WEIGHT: 146 LBS | HEIGHT: 67 IN | RESPIRATION RATE: 16 BRPM | BODY MASS INDEX: 22.91 KG/M2 | TEMPERATURE: 98.3 F | HEART RATE: 72 BPM | DIASTOLIC BLOOD PRESSURE: 74 MMHG | OXYGEN SATURATION: 99 %

## 2024-08-15 DIAGNOSIS — S69.90XA FISH HOOK IN FINGER: ICD-10-CM

## 2024-08-15 PROCEDURE — 250N000011 HC RX IP 250 OP 636: Performed by: EMERGENCY MEDICINE

## 2024-08-15 PROCEDURE — 99283 EMERGENCY DEPT VISIT LOW MDM: CPT | Mod: 25

## 2024-08-15 PROCEDURE — 90715 TDAP VACCINE 7 YRS/> IM: CPT | Performed by: EMERGENCY MEDICINE

## 2024-08-15 PROCEDURE — 90471 IMMUNIZATION ADMIN: CPT | Performed by: EMERGENCY MEDICINE

## 2024-08-15 PROCEDURE — 10120 INC&RMVL FB SUBQ TISS SMPL: CPT | Mod: F2

## 2024-08-15 PROCEDURE — 73140 X-RAY EXAM OF FINGER(S): CPT | Mod: LT

## 2024-08-15 RX ADMIN — CLOSTRIDIUM TETANI TOXOID ANTIGEN (FORMALDEHYDE INACTIVATED), CORYNEBACTERIUM DIPHTHERIAE TOXOID ANTIGEN (FORMALDEHYDE INACTIVATED), BORDETELLA PERTUSSIS TOXOID ANTIGEN (GLUTARALDEHYDE INACTIVATED), BORDETELLA PERTUSSIS FILAMENTOUS HEMAGGLUTININ ANTIGEN (FORMALDEHYDE INACTIVATED), BORDETELLA PERTUSSIS PERTACTIN ANTIGEN, AND BORDETELLA PERTUSSIS FIMBRIAE 2/3 ANTIGEN 0.5 ML: 5; 2; 2.5; 5; 3; 5 INJECTION, SUSPENSION INTRAMUSCULAR at 19:49

## 2024-08-15 ASSESSMENT — COLUMBIA-SUICIDE SEVERITY RATING SCALE - C-SSRS
2. HAVE YOU ACTUALLY HAD ANY THOUGHTS OF KILLING YOURSELF IN THE PAST MONTH?: NO
6. HAVE YOU EVER DONE ANYTHING, STARTED TO DO ANYTHING, OR PREPARED TO DO ANYTHING TO END YOUR LIFE?: NO
1. IN THE PAST MONTH, HAVE YOU WISHED YOU WERE DEAD OR WISHED YOU COULD GO TO SLEEP AND NOT WAKE UP?: NO

## 2024-08-15 NOTE — ED TRIAGE NOTES
Pt here d/t Left middle finger fish hook embedded near knuckle. +CMS     Triage Assessment (Adult)       Row Name 08/15/24 1841          Triage Assessment    Airway WDL WDL        Respiratory WDL    Respiratory WDL WDL        Peripheral/Neurovascular WDL    Peripheral Neurovascular WDL WDL

## 2024-08-16 NOTE — DISCHARGE INSTRUCTIONS
You are seen and evaluated here in the emergency department after getting a fishhook stuck in your left middle finger.  Tetanus updated here.  We were able to remove the fishhook here and recommend washing it with soap and water and watching out for signs symptoms of infection.  If it gets red, hot, swollen, pus starts to drain from the wound, get a fever or other concerns please return to the emergency department.  Otherwise, wash the wound with soap and water daily and follow-up with primary care as needed.

## 2024-08-16 NOTE — ED PROVIDER NOTES
EMERGENCY DEPARTMENT ENCOUNTER      NAME: Sweetie Carrero  AGE: 74 year old male  YOB: 1950  MRN: [id number]  EVALUATION DATE & TIME: No admission date for patient encounter.    PCP: Clinic, [facility]    ED PROVIDER: Luana Busch PA-C      Chief Complaint   Patient presents with    Puncture Wound     Left middle finger has fish hook embedded.          FINAL IMPRESSION:  1. Fish hook in finger          MEDICAL DECISION MAKING:    Pertinent Labs & Imaging studies reviewed. (See chart for details)  Sweetie Carrero is a 74 year old male with a pertinent history of hypertension, hyperlipidemia, type II diabetes who presents to this ED via private vehicle for evaluation of fishhook in the left middle finger.  The patient was fishing when he accidentally got stuck with a fishhook.  He was unable to remove it himself and presents to the emergency department.  On my evaluation, patient was slightly hypertensive at 158/74 but otherwise vitally stable.  Examination with fishhook in the left middle digit near the PIP joint.  Distal CMS intact.  Discussed removal with the string method and this was performed by JACLYN Snyder as detailed below.  Patient tolerated the procedure well.  Wound was cleaned, bandage applied and tetanus updated here.  Discussed wound care, signs and symptoms of infection as well as reasons to return and close follow-up with primary care as needed.  All questions were answered best my ability and he was discharged home in stable condition.    Medical Decision Making  Obtained supplemental history:Supplemental history obtained?: No  Reviewed external records: External records reviewed?: No  Care impacted by chronic illness:Diabetes, Hyperlipidemia, and Hypertension  Care significantly affected by social determinants of health:Access to Medical Care  Did you consider but not order tests?: Work up considered but not performed and documented in chart, if applicable  Did you  interpret images independently?: Independent interpretation of ECG and images noted in documentation, when applicable.  Consultation discussion with other provider:Did you involve another provider (consultant, MH, pharmacy, etc.)?: No  Discharge. No recommendations on prescription strength medication(s). See documentation for any additional details.     ED COURSE:  7:30 PM I met with the patient, obtained history, performed an initial exam, and discussed options and plan for diagnostics and treatment here in the ED.       7:40 PM Patient discharged after being provided with extensive anticipatory guidance and given return precautions, importance of PCP follow-up emphasized.    At the conclusion of the encounter I discussed the results of all of the tests and the disposition. The questions were answered. The patient or family acknowledged understanding and was agreeable with the care plan.     MEDICATIONS GIVEN IN THE EMERGENCY:  Medications   Tdap (tetanus-diphtheria-acell pertussis) (ADACEL) injection 0.5 mL (has no administration in time range)       NEW PRESCRIPTIONS STARTED AT TODAY'S ER VISIT  New Prescriptions    No medications on file            =================================================================    HPI:    Patient information was obtained from: The patient    Use of Interpretor: N/A          Sweetie Carrero is a 74 year old male with a pertinent history of hypertension, hyperlipidemia, type II diabetes who presents to this ED via private vehicle for evaluation of fishhook in the left middle finger.  The patient was fishing when he accidentally got stuck with a fishhook.  He was unable to remove it himself and presents to the emergency department.  On my evaluation, the patient reports some pain at the site but otherwise no other injuries.  Able to move the finger without significant difficulty however, if he moves it too much he does get poked by the hook.  No other concerns voiced.      REVIEW OF  SYSTEMS:  Negative unless otherwise stated in the above HPI.       PAST MEDICAL HISTORY:  Past Medical History:   Diagnosis Date    Diabetes (H)     controlled by diet     Hypertension     Kidney stones     Paget's disease of skin of scrotum (H)        PAST SURGICAL HISTORY:  Past Surgical History:   Procedure Laterality Date    COLONOSCOPY N/A 5/24/2019    Procedure: Colonoscopy;  Surgeon: Von Owen MD;  Location: [facility] OR    EXAM UNDER ANESTHESIA ANUS N/A 5/24/2019    Procedure: Examination Under Anesthesia with Perianal Mapping and Biopsies;  Surgeon: Von Owen MD;  Location: [facility]    EXCISE LESION PERINEAL N/A 8/27/2019    Procedure: Wide Local Excision of Perineal Skin Lesion;  Surgeon: Fracisco Gtz MD;  Location: [facility] OR    EXPLORE SCROTUM N/A 12/5/2018    Procedure: Wide Local Excision of Scrotal Lesions;  Surgeon: Fracisco Gtz MD;  Location: [facility] OR    GRAFT FLAP PEDICLE PERINEUM Left 8/29/2019    Procedure: Closure Of Perineal Wound With Left  Local And Regional Flaps;  Surgeon: Aditya Myles MD;  Location: [facility] OR    GRAFT SKIN SPLIT THICKNESS FROM EXTREMITY Left 2/21/2019    Procedure: Split Thickness Skin Graft From Left Thigh;  Surgeon: Fracisco Gtz MD;  Location: [facility] OR    GRAFT SKIN SPLIT THICKNESS FROM EXTREMITY Left 8/29/2019    Procedure: Skin Graft Left inner thigh.;  Surgeon: Aditya Myles MD;  Location: [facility] OR    SCROTOPLASTY N/A 2/21/2019    Procedure: Removal Of Scrotal Skin;  Surgeon: Fracisco Gtz MD;  Location: [facility] OR    SKIN LESION EXCISION             CURRENT MEDICATIONS:      Current Facility-Administered Medications:     Tdap (tetanus-diphtheria-acell pertussis) (ADACEL) injection 0.5 mL, 0.5 mL, Intramuscular, Once, Luana Busch PA-C    Current Outpatient Medications:     fish oil-omega-3 fatty acids 1000 MG capsule, Take 2 g by mouth daily, Disp: , Rfl:     hydrochlorothiazide (HYDRODIURIL) 12.5 MG tablet, Take 12.5 mg by mouth, Disp: ,  Rfl:     indomethacin (INDOCIN) 50 MG capsule, Take 50 mg by mouth, Disp: , Rfl:     lisinopril (PRINIVIL/ZESTRIL) 40 MG tablet, Take 40 mg by mouth daily, Disp: , Rfl:     mupirocin (BACTROBAN) 2 % ointment, Apply topically 3 times daily, Disp: , Rfl:     pravastatin (PRAVACHOL) 20 MG tablet, TAKE ONE TABLET BY MOUTH ONCE DAILY, Disp: , Rfl:       ALLERGIES:  Allergies   Allergen Reactions    Atorvastatin Muscle Pain (Myalgia)     PN: weakness    Seasonal Allergies     Simvastatin Muscle Pain (Myalgia)     PN: weakness       FAMILY HISTORY:  No family history on file.    SOCIAL HISTORY:   Social History     Socioeconomic History    Marital status:    Tobacco Use    Smoking status: Former    Smokeless tobacco: Never   Substance and Sexual Activity    Alcohol use: Yes     Comment: rarely    Drug use: No     Social Determinants of Health     Financial Resource Strain: Low Risk  (10/3/2023)    Received from [facility]    Overall Financial Resource Strain (CARDIA)     Difficulty of Paying Living Expenses: Not very hard   Food Insecurity: No Food Insecurity (10/3/2023)    Received from [facility]    Hunger Vital Sign     Worried About Running Out of Food in the Last Year: Never true     Ran Out of Food in the Last Year: Never true   Transportation Needs: No Transportation Needs (10/3/2023)    Received from [facility]    PRAPARE - Transportation     Lack of Transportation (Medical): No     Lack of Transportation (Non-Medical): No   Physical Activity: Insufficiently Active (10/3/2023)    Received from [facility]    Exercise Vital Sign     Days of Exercise per Week: 3 days     Minutes of Exercise per Session: 30 min   Housing Stability: Low Risk  (10/3/2023)    Received from [facility]    Housing Stability     What is your living situation today?: I have a steady place to live       VITALS:  Patient Vitals for the past 24 hrs:   BP Temp Temp src Pulse Resp SpO2 Height Weight   08/15/24 1838 (!) 158/74 98.3  F  "(36.8  C) Temporal 72 16 99 % 1.702 m (5' 7\") 66.2 kg (146 lb)       PHYSICAL EXAM    Constitutional: Well developed, Well nourished, NAD  HENT: Normocephalic, Atraumatic, Bilateral external ears normal, Oropharynx normal, mucous membranes moist, Nose normal.   Neck: Normal range of motion, No tenderness, Supple, No stridor.  Eyes: Eyes track normally throughout exam, Conjunctiva normal, No discharge.   Respiratory: Speaks full sentences easily. No cough.  Cardiovascular: Heart rate and blood pressure as above.   Musculoskeletal:  Good range of motion in all major joints.   Integument: Fish hook in left middle finger, distal CMS intact. Warm, Dry, No erythema, No rash. No petechiae.  Neurologic: Alert & oriented x 3, Normal motor function, Normal sensory function, No focal deficits noted. Normal gait.  Psychiatric: Affect normal, Judgment normal, Mood normal. Cooperative.    LAB:  All pertinent labs reviewed and interpreted.  No results found for this or any previous visit (from the past 24 hour(s)).      RADIOLOGY:  Reviewed all pertinent imaging. Please see official radiology report.  Fingers XR, 2-3 views, left   Final Result   IMPRESSION: There is a fishhook lodged in the dorsal/ulnar soft tissues along the distal margin of the proximal phalanx of the middle finger. No fracture.             PROCEDURES:   PROCEDURE: Foreign body removal   INDICATIONS: Fish hook in finger   PROCEDURE PROVIDER: Diamond Mo PA-C   CONSENT: Risks, benefits and alternatives were discussed with and Verbal consent was obtained from Patient.   MEDICATIONS: None.   DETAILS: Fish hook wrapped in guaze and tipped to have josé luis flush with insertion site. Hook pulled and removed successfully. Slight pressure and bleeding controlled. Area cleaned and bandage applied.   COMPLICATIONS: Patient tolerated procedure well, without complication        Luana Busch PA-C  Emergency Medicine  [facility]  8/15/2024      Luana Busch, " YULI  08/1950

## 2024-10-13 ENCOUNTER — HEALTH MAINTENANCE LETTER (OUTPATIENT)
Age: 74
End: 2024-10-13

## 2025-05-03 ENCOUNTER — HEALTH MAINTENANCE LETTER (OUTPATIENT)
Age: 75
End: 2025-05-03

## (undated) DEVICE — SOL WATER IRRIG 500ML BOTTLE 2F7113

## (undated) DEVICE — PREP SKIN SCRUB TRAY 4461A

## (undated) DEVICE — DRSG PRIMAPORE 02X3" 7133

## (undated) DEVICE — Device

## (undated) DEVICE — ESU GROUND PAD ADULT W/CORD E7507

## (undated) DEVICE — CATH PLUG W/CAP 000076

## (undated) DEVICE — DRSG GAUZE 4X4" TRAY 6939

## (undated) DEVICE — PACK SET-UP STD 9102

## (undated) DEVICE — STPL SKIN 35W ROTATING HEAD PRW35

## (undated) DEVICE — BLADE DERMATOME ZIMMER  00-8800-000-10

## (undated) DEVICE — GLOVE ESTEEM BLUE W/NEU-THERA 7.5  2D73PB75

## (undated) DEVICE — CATH TRAY FOLEY SURESTEP 16FR W/URNE MTR STLK LATEX A303316A

## (undated) DEVICE — JELLY LUBRICATING SURGILUBE 2OZ TUBE

## (undated) DEVICE — PREP POVIDONE IODINE SCRUB 7.5% 4OZ APL82212

## (undated) DEVICE — SU ETHILON 3-0 PS-1 18" 1663H

## (undated) DEVICE — GLOVE PROTEXIS W/NEU-THERA 7.5  2D73TE75

## (undated) DEVICE — SU SILK 3-0 SH CR 8X18" C013D

## (undated) DEVICE — DRAPE SHEET MED 44X70" 9355

## (undated) DEVICE — SPONGE LAP 18X18" X8435

## (undated) DEVICE — LINEN TOWEL PACK X5 5464

## (undated) DEVICE — GLOVE PROTEXIS POWDER FREE 7.5 ORTHOPEDIC 2D73ET75

## (undated) DEVICE — DRSG TELFA 3X8" 1238

## (undated) DEVICE — PREP POVIDONE IODINE SOLUTION 10% 120ML

## (undated) DEVICE — DRSG MEDIPORE 3 1/2X13 3/4" 3573

## (undated) DEVICE — GLOVE PROTEXIS W/NEU-THERA 8.0  2D73TE80

## (undated) DEVICE — PREP POVIDONE IODINE SOLUTION 10% 4OZ

## (undated) DEVICE — PREP CHLORAPREP 26ML TINTED ORANGE  260815

## (undated) DEVICE — TUBING SUCTION 12"X1/4" N612

## (undated) DEVICE — SU VICRYL 3-0 SH 27" UND J416H

## (undated) DEVICE — LINEN GOWN XLG 5407

## (undated) DEVICE — STRAP KNEE/BODY 31143004

## (undated) DEVICE — DRSG XEROFORM 5X9" CUR253590W

## (undated) DEVICE — CATH FOLYSIL 16FR 15ML AA6116

## (undated) DEVICE — STPL SKIN PROXIMATE 35 WIDE PMW35

## (undated) DEVICE — SOL WATER IRRIG 1000ML BOTTLE 2F7114

## (undated) DEVICE — BLADE KNIFE SURG 15 371115

## (undated) DEVICE — SU CHROMIC 3-0 FS-2 27" 636

## (undated) DEVICE — ANOSCOPE PLASTIC CLEAR UNSTERILE 82420

## (undated) DEVICE — TAPE DURAPORE 3" SILK 1538-3

## (undated) DEVICE — DRAIN JACKSON PRATT CHANNEL 15FR ROUND HUBLESS SIL JP-2228

## (undated) DEVICE — DRSG KERLIX 4 1/2"X4YDS ROLL 6715

## (undated) DEVICE — WIPES FOLEY CARE SURESTEP PROVON DFC100

## (undated) DEVICE — BLADE CLIPPER SGL USE 9680

## (undated) DEVICE — LINEN TOWEL PACK X30 5481

## (undated) DEVICE — DERMACARRIERS 3:1 ZIMMER 00-2195-013-00

## (undated) DEVICE — PEN MARKING W/RULER DYNJSM04

## (undated) DEVICE — TUBING SUCTION 10'X3/16" N510

## (undated) DEVICE — ENDO CAP AND TUBING STERILE FOR ENDOGATOR  100130

## (undated) DEVICE — PREP POVIDONE IODINE SCRUB 7.5% 120ML

## (undated) DEVICE — BNDG ELASTIC 4"X5YDS STERILE 6611-4S

## (undated) DEVICE — SU ETHILON 2-0 FS 18" 664H

## (undated) DEVICE — ESU PENCIL SMOKE EVAC W/ROCKER SWITCH 0703-047-000

## (undated) DEVICE — LINEN TOWEL PACK X6 WHITE 5487

## (undated) DEVICE — SU VICRYL 4-0 RB-1 27" UND J214H

## (undated) DEVICE — SOL ADH LIQUID BENZOIN SWAB 0.6ML C1544

## (undated) DEVICE — SU MONOCRYL 4-0 PS-2 27" UND Y426H

## (undated) DEVICE — PACK VAG HYST

## (undated) DEVICE — SYR BULB IRRIG 50ML LATEX FREE 0035280

## (undated) DEVICE — PACK ENT MINOR CUSTOM ASC

## (undated) DEVICE — WIPE PREMOIST CLEANSING WASHCLOTHS 7988

## (undated) DEVICE — PUNCH SKIN 3MM P350

## (undated) DEVICE — DRAPE POUCH INSTRUMENT 1018

## (undated) DEVICE — DRAPE U SPLIT 74X120" 29440

## (undated) DEVICE — DRSG COTTON BALL 6PK LCB62

## (undated) DEVICE — SUCTION MANIFOLD NEPTUNE 2 SYS 1 PORT 702-025-000

## (undated) DEVICE — SU MONOCRYL 2-0 SH 27" UND Y417H

## (undated) DEVICE — DRAIN JACKSON PRATT CHANNEL 10FR RND SIL W/TROCAR JP-2227

## (undated) DEVICE — SOL NACL 0.9% IRRIG 1000ML BOTTLE 2F7124

## (undated) DEVICE — DRSG TEGADERM 4X10" 1627

## (undated) DEVICE — TONGUE DEPRESSOR STERILE 6023

## (undated) DEVICE — SU SILK 3-0 RB-1 30" K872H

## (undated) DEVICE — PAD CHUX UNDERPAD 23X24" 7136

## (undated) DEVICE — DRAIN JACKSON PRATT RESERVOIR 100ML SU130-1305

## (undated) DEVICE — SUCTION MANIFOLD DORNOCH ULTRA CART UL-CL500

## (undated) DEVICE — ENDO TUBING CO2 SMARTCAP STERILE DISP 100145CO2EXT

## (undated) DEVICE — GLOVE PROTEXIS BLUE W/NEU-THERA 7.5  2D73EB75

## (undated) DEVICE — GOWN ISOLATION YELLOW XL NOT STERILE 3111PG

## (undated) DEVICE — NDL ANGIOCATH 14GA 1.25" 4048

## (undated) DEVICE — SUPPORTER ATHLETIC W/KNIT POUCH & LEG STRAP MED 202549

## (undated) DEVICE — PREP TECHNI-CARE CHLOROXYLENOL 3% 4OZ BOTTLE C222-4ZWO

## (undated) DEVICE — SU VICRYL 2-0 CT-2 27" UND J269H

## (undated) DEVICE — DRAPE GYN/UROLOGY FLUID POUCH TUR 29455

## (undated) DEVICE — DRAIN PENROSE 5/8X18" LATEX 0918040

## (undated) DEVICE — SUCTION MANIFOLD NEPTUNE 2 SYS 4 PORT 0702-020-000

## (undated) DEVICE — LABEL MEDICATION SYSTEM 3303-P

## (undated) DEVICE — GLOVE PROTEXIS MICRO 7.0  2D73PM70

## (undated) DEVICE — PAD CHUX UNDERPAD 30X30"

## (undated) DEVICE — SUCTION TIP YANKAUER STR K87

## (undated) DEVICE — DRSG AQUACEL AG EXTRA 8X12" 420679

## (undated) DEVICE — CLIP HORIZON MED BLUE 002200

## (undated) DEVICE — SYR 10ML LL W/O NDL 302995

## (undated) DEVICE — KIT CONNECTOR FOR OLYMPUS ENDOSCOPES DEFENDO 100310

## (undated) DEVICE — DRAPE IOBAN INCISE 23X17" 6650EZ

## (undated) DEVICE — DRSG ABDOMINAL 07 1/2X8" 7197D

## (undated) DEVICE — BNDG ELASTIC 6" DBL LENGTH UNSTERILE 6611-16

## (undated) DEVICE — ESU ELEC BLADE 2.75" COATED/INSULATED E1455

## (undated) RX ORDER — HYDROMORPHONE HYDROCHLORIDE 1 MG/ML
INJECTION, SOLUTION INTRAMUSCULAR; INTRAVENOUS; SUBCUTANEOUS
Status: DISPENSED
Start: 2019-08-29

## (undated) RX ORDER — FENTANYL CITRATE 50 UG/ML
INJECTION, SOLUTION INTRAMUSCULAR; INTRAVENOUS
Status: DISPENSED
Start: 2019-08-27

## (undated) RX ORDER — FENTANYL CITRATE 50 UG/ML
INJECTION, SOLUTION INTRAMUSCULAR; INTRAVENOUS
Status: DISPENSED
Start: 2019-02-21

## (undated) RX ORDER — CEFAZOLIN SODIUM 1 G/50ML
SOLUTION INTRAVENOUS
Status: DISPENSED
Start: 2019-08-27

## (undated) RX ORDER — MINERAL OIL
OIL (ML) MISCELLANEOUS
Status: DISPENSED
Start: 2019-02-21

## (undated) RX ORDER — LIDOCAINE HYDROCHLORIDE 20 MG/ML
INJECTION, SOLUTION EPIDURAL; INFILTRATION; INTRACAUDAL; PERINEURAL
Status: DISPENSED
Start: 2019-02-21

## (undated) RX ORDER — FENTANYL CITRATE 50 UG/ML
INJECTION, SOLUTION INTRAMUSCULAR; INTRAVENOUS
Status: DISPENSED
Start: 2019-05-24

## (undated) RX ORDER — EPINEPHRINE 1 MG/ML
INJECTION, SOLUTION INTRAMUSCULAR; SUBCUTANEOUS
Status: DISPENSED
Start: 2019-02-21

## (undated) RX ORDER — CEFAZOLIN SODIUM 1 G/3ML
INJECTION, POWDER, FOR SOLUTION INTRAMUSCULAR; INTRAVENOUS
Status: DISPENSED
Start: 2019-08-29

## (undated) RX ORDER — BUPIVACAINE HYDROCHLORIDE 5 MG/ML
INJECTION, SOLUTION EPIDURAL; INTRACAUDAL
Status: DISPENSED
Start: 2018-12-05

## (undated) RX ORDER — FENTANYL CITRATE 50 UG/ML
INJECTION, SOLUTION INTRAMUSCULAR; INTRAVENOUS
Status: DISPENSED
Start: 2018-12-05

## (undated) RX ORDER — CEFAZOLIN SODIUM 1 G/3ML
INJECTION, POWDER, FOR SOLUTION INTRAMUSCULAR; INTRAVENOUS
Status: DISPENSED
Start: 2018-12-05

## (undated) RX ORDER — ONDANSETRON 2 MG/ML
INJECTION INTRAMUSCULAR; INTRAVENOUS
Status: DISPENSED
Start: 2019-02-21

## (undated) RX ORDER — FENTANYL CITRATE 50 UG/ML
INJECTION, SOLUTION INTRAMUSCULAR; INTRAVENOUS
Status: DISPENSED
Start: 2019-08-29

## (undated) RX ORDER — PAPAVERINE HYDROCHLORIDE 30 MG/ML
INJECTION INTRAMUSCULAR; INTRAVENOUS
Status: DISPENSED
Start: 2019-08-29

## (undated) RX ORDER — LIDOCAINE HYDROCHLORIDE AND EPINEPHRINE 15; 5 MG/ML; UG/ML
INJECTION, SOLUTION EPIDURAL
Status: DISPENSED
Start: 2018-11-26

## (undated) RX ORDER — PROPOFOL 10 MG/ML
INJECTION, EMULSION INTRAVENOUS
Status: DISPENSED
Start: 2019-02-21

## (undated) RX ORDER — CEFAZOLIN SODIUM 2 G/100ML
INJECTION, SOLUTION INTRAVENOUS
Status: DISPENSED
Start: 2019-02-21

## (undated) RX ORDER — HYDROMORPHONE HYDROCHLORIDE 1 MG/ML
INJECTION, SOLUTION INTRAMUSCULAR; INTRAVENOUS; SUBCUTANEOUS
Status: DISPENSED
Start: 2019-02-21

## (undated) RX ORDER — GABAPENTIN 100 MG/1
CAPSULE ORAL
Status: DISPENSED
Start: 2019-05-24

## (undated) RX ORDER — CEFAZOLIN SODIUM 1 G/3ML
INJECTION, POWDER, FOR SOLUTION INTRAMUSCULAR; INTRAVENOUS
Status: DISPENSED
Start: 2019-02-21

## (undated) RX ORDER — LIDOCAINE HYDROCHLORIDE 10 MG/ML
INJECTION, SOLUTION EPIDURAL; INFILTRATION; INTRACAUDAL; PERINEURAL
Status: DISPENSED
Start: 2019-01-28

## (undated) RX ORDER — PROPOFOL 10 MG/ML
INJECTION, EMULSION INTRAVENOUS
Status: DISPENSED
Start: 2019-08-29

## (undated) RX ORDER — DEXAMETHASONE SODIUM PHOSPHATE 4 MG/ML
INJECTION, SOLUTION INTRA-ARTICULAR; INTRALESIONAL; INTRAMUSCULAR; INTRAVENOUS; SOFT TISSUE
Status: DISPENSED
Start: 2019-05-24

## (undated) RX ORDER — ONDANSETRON 2 MG/ML
INJECTION INTRAMUSCULAR; INTRAVENOUS
Status: DISPENSED
Start: 2018-12-05

## (undated) RX ORDER — EPHEDRINE SULFATE 50 MG/ML
INJECTION, SOLUTION INTRAMUSCULAR; INTRAVENOUS; SUBCUTANEOUS
Status: DISPENSED
Start: 2019-02-21

## (undated) RX ORDER — LABETALOL HYDROCHLORIDE 5 MG/ML
INJECTION, SOLUTION INTRAVENOUS
Status: DISPENSED
Start: 2019-02-21

## (undated) RX ORDER — SODIUM CHLORIDE 9 MG/ML
INJECTION, SOLUTION INTRAVENOUS
Status: DISPENSED
Start: 2019-08-27

## (undated) RX ORDER — ACETAMINOPHEN 325 MG/1
TABLET ORAL
Status: DISPENSED
Start: 2018-12-05

## (undated) RX ORDER — SODIUM CHLORIDE 9 MG/ML
INJECTION, SOLUTION INTRAVENOUS
Status: DISPENSED
Start: 2019-08-29

## (undated) RX ORDER — SODIUM CHLORIDE 9 MG/ML
INJECTION, SOLUTION INTRAVENOUS
Status: DISPENSED
Start: 2019-02-21

## (undated) RX ORDER — ONDANSETRON 2 MG/ML
INJECTION INTRAMUSCULAR; INTRAVENOUS
Status: DISPENSED
Start: 2019-05-24

## (undated) RX ORDER — LIDOCAINE HYDROCHLORIDE 20 MG/ML
INJECTION, SOLUTION EPIDURAL; INFILTRATION; INTRACAUDAL; PERINEURAL
Status: DISPENSED
Start: 2019-08-29

## (undated) RX ORDER — PROPOFOL 10 MG/ML
INJECTION, EMULSION INTRAVENOUS
Status: DISPENSED
Start: 2019-05-24

## (undated) RX ORDER — ACETAMINOPHEN 325 MG/1
TABLET ORAL
Status: DISPENSED
Start: 2019-02-21

## (undated) RX ORDER — EPINEPHRINE NASAL SOLUTION 1 MG/ML
SOLUTION NASAL
Status: DISPENSED
Start: 2019-02-21

## (undated) RX ORDER — BACITRACIN 500 [USP'U]/G
OINTMENT OPHTHALMIC
Status: DISPENSED
Start: 2019-02-21

## (undated) RX ORDER — ACETAMINOPHEN 325 MG/1
TABLET ORAL
Status: DISPENSED
Start: 2019-05-24

## (undated) RX ORDER — DEXAMETHASONE SODIUM PHOSPHATE 4 MG/ML
INJECTION, SOLUTION INTRA-ARTICULAR; INTRALESIONAL; INTRAMUSCULAR; INTRAVENOUS; SOFT TISSUE
Status: DISPENSED
Start: 2018-12-05

## (undated) RX ORDER — ACETAMINOPHEN 325 MG/1
TABLET ORAL
Status: DISPENSED
Start: 2019-08-27